# Patient Record
Sex: FEMALE | Race: WHITE | NOT HISPANIC OR LATINO | URBAN - METROPOLITAN AREA
[De-identification: names, ages, dates, MRNs, and addresses within clinical notes are randomized per-mention and may not be internally consistent; named-entity substitution may affect disease eponyms.]

---

## 2017-04-18 ENCOUNTER — OUTPATIENT (OUTPATIENT)
Dept: OUTPATIENT SERVICES | Facility: HOSPITAL | Age: 52
LOS: 1 days | End: 2017-04-18
Payer: COMMERCIAL

## 2017-04-18 ENCOUNTER — APPOINTMENT (OUTPATIENT)
Dept: CT IMAGING | Facility: HOSPITAL | Age: 52
End: 2017-04-18

## 2017-04-18 DIAGNOSIS — M71.30 OTHER BURSAL CYST, UNSPECIFIED SITE: Chronic | ICD-10-CM

## 2017-04-18 DIAGNOSIS — Z98.89 OTHER SPECIFIED POSTPROCEDURAL STATES: Chronic | ICD-10-CM

## 2017-04-18 DIAGNOSIS — R91.8 OTHER NONSPECIFIC ABNORMAL FINDING OF LUNG FIELD: ICD-10-CM

## 2017-04-18 PROCEDURE — 71250 CT THORAX DX C-: CPT

## 2017-05-10 ENCOUNTER — EMERGENCY (EMERGENCY)
Facility: HOSPITAL | Age: 52
LOS: 1 days | Discharge: HOME | End: 2017-05-10
Attending: EMERGENCY MEDICINE | Admitting: EMERGENCY MEDICINE
Payer: COMMERCIAL

## 2017-05-10 VITALS
RESPIRATION RATE: 16 BRPM | HEART RATE: 87 BPM | HEIGHT: 64 IN | TEMPERATURE: 98 F | DIASTOLIC BLOOD PRESSURE: 97 MMHG | OXYGEN SATURATION: 97 % | WEIGHT: 164.91 LBS | SYSTOLIC BLOOD PRESSURE: 144 MMHG

## 2017-05-10 VITALS — HEART RATE: 95 BPM | SYSTOLIC BLOOD PRESSURE: 174 MMHG | OXYGEN SATURATION: 97 % | DIASTOLIC BLOOD PRESSURE: 80 MMHG

## 2017-05-10 DIAGNOSIS — Z98.89 OTHER SPECIFIED POSTPROCEDURAL STATES: Chronic | ICD-10-CM

## 2017-05-10 DIAGNOSIS — Z88.1 ALLERGY STATUS TO OTHER ANTIBIOTIC AGENTS STATUS: ICD-10-CM

## 2017-05-10 DIAGNOSIS — M71.30 OTHER BURSAL CYST, UNSPECIFIED SITE: Chronic | ICD-10-CM

## 2017-05-10 DIAGNOSIS — K21.9 GASTRO-ESOPHAGEAL REFLUX DISEASE WITHOUT ESOPHAGITIS: ICD-10-CM

## 2017-05-10 DIAGNOSIS — J45.909 UNSPECIFIED ASTHMA, UNCOMPLICATED: ICD-10-CM
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
## 2017-05-10 DIAGNOSIS — M25.571 PAIN IN RIGHT ANKLE AND JOINTS OF RIGHT FOOT: ICD-10-CM

## 2017-05-10 DIAGNOSIS — E03.9 HYPOTHYROIDISM, UNSPECIFIED: ICD-10-CM
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
## 2017-05-10 DIAGNOSIS — S80.211A ABRASION, RIGHT KNEE, INITIAL ENCOUNTER: ICD-10-CM

## 2017-05-10 DIAGNOSIS — Z98.890 OTHER SPECIFIED POSTPROCEDURAL STATES: ICD-10-CM
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
## 2017-05-10 DIAGNOSIS — M54.5 LOW BACK PAIN: ICD-10-CM
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
## 2017-05-10 PROCEDURE — 73562 X-RAY EXAM OF KNEE 3: CPT

## 2017-05-10 PROCEDURE — 73630 X-RAY EXAM OF FOOT: CPT | Mod: 26,RT

## 2017-05-10 PROCEDURE — 72100 X-RAY EXAM L-S SPINE 2/3 VWS: CPT | Mod: 26

## 2017-05-10 PROCEDURE — 99284 EMERGENCY DEPT VISIT MOD MDM: CPT | Mod: 25

## 2017-05-10 PROCEDURE — 73562 X-RAY EXAM OF KNEE 3: CPT | Mod: 26,RT

## 2017-05-10 PROCEDURE — 72100 X-RAY EXAM L-S SPINE 2/3 VWS: CPT

## 2017-05-10 PROCEDURE — 73630 X-RAY EXAM OF FOOT: CPT

## 2017-05-10 PROCEDURE — 99284 EMERGENCY DEPT VISIT MOD MDM: CPT

## 2017-05-10 RX ORDER — IBUPROFEN 200 MG
600 TABLET ORAL ONCE
Qty: 0 | Refills: 0 | Status: COMPLETED | OUTPATIENT
Start: 2017-05-10 | End: 2017-05-10

## 2017-05-10 RX ADMIN — Medication 600 MILLIGRAM(S): at 18:53

## 2017-05-10 NOTE — ED STATDOCS - PSH
H/O endoscopic sinus surgery  rhinoplasty 5/2015  H/O lymph node excision  09/2010, benign  S/P thoracotomy  06/30/2013  Right VATS  Wedge resection  Synovial cyst  L5- S1   08/26/2016 back surgery

## 2017-05-10 NOTE — ED STATDOCS - PMH
Asthma  controlled on meds  Autoimmune disorder  Auto immune lung disorder  immunoglobulin  deficiency(IgG)  Intertitial Cellular pneumonia  -2013 VATS/ wedge rsx  Chronic sinusitis  frontal, maxillary  h/o sinus surgery  GERD (gastroesophageal reflux disease)    GERD (gastroesophageal reflux disease)    Hypothyroid    Lung nodules    Migraine  "not for a while now"  Rosacea

## 2017-05-10 NOTE — ED STATDOCS - PROGRESS NOTE DETAILS
Pt seen and she stated that her foot hurt . Pt stated that she feel down some stairs. Pt denies head trauma or trauma to any other parts  of her body. Examination + tenderness to right great toe with no ecchymosis. Pt unable to bear weight on foot due to pain. Posterior splint applied and crutches provided. Pt will continue with ibuprofen and F/U with Podiatrist.

## 2017-05-10 NOTE — ED STATDOCS - OBJECTIVE STATEMENT
50 y/o female presents c/o right foot pain s/p fall. Pt also notes abrasion to right knee. Denies any head injuries/trauma. Pt has not taken any medications for pain. PSHx = lumbar fusion. Pt requesting back XR to "make sure she didn't mess up her fusion" but denies any back pain.

## 2017-05-10 NOTE — ED STATDOCS - CARE PLAN
Principal Discharge DX:	Right foot pain  Instructions for follow-up, activity and diet:	Decrease weight bearing and OTC ( Ibuprofen ) . F/U with orthopedic  Secondary Diagnosis:	Acute right ankle pain  Secondary Diagnosis:	Low back pain without sciatica, unspecified back pain laterality, unspecified chronicity

## 2017-05-10 NOTE — ED STATDOCS - NS ED MD SCRIBE ATTENDING SCRIBE SECTIONS
REVIEW OF SYSTEMS/DISPOSITION/PAST MEDICAL/SURGICAL/SOCIAL HISTORY/HIV/VITAL SIGNS( Pullset)/PHYSICAL EXAM/HISTORY OF PRESENT ILLNESS

## 2017-05-10 NOTE — ED ADULT TRIAGE NOTE - CHIEF COMPLAINT QUOTE
slipped at work down one step. patient reporting pain right knee/right foot. patient reports hx lumbar fusion 1 year ago; denies any back pain. no anticoags. did not hit head or lose consciousness.

## 2017-05-10 NOTE — ED STATDOCS - SKIN, MLM
Abrasion to anterior right knee. Healed scar to low back noted. Abrasion to anterior right knee. Healed scar to lower back noted.

## 2017-05-10 NOTE — ED STATDOCS - MUSCULOSKELETAL, MLM
Right foot: First digit tender to palpation. Good cap refill. Pulses intact. Spine normal, nontender.

## 2017-05-10 NOTE — ED STATDOCS - ATTENDING CONTRIBUTION TO CARE
I, Brock Joya, performed the initial face to face bedside interview with this patient regarding history of present illness, review of symptoms and relevant past medical, social and family history.  I completed an independent physical examination.  I was the initial provider who evaluated this patient. I have signed out the follow up of any pending tests (i.e. labs, radiological studies) to the ACP.  I have communicated the patient’s plan of care and disposition with the ACP.  The history, relevant review of systems, past medical and surgical history, medical decision making, and physical examination was documented by the scribe in my presence and I attest to the accuracy of the documentation.

## 2017-05-10 NOTE — ED STATDOCS - CONSTITUTIONAL, MLM
normal... well appearing, well nourished, and in no apparent distress. well appearing, well nourished, and in no apparent distress.  anxious appearing

## 2017-05-10 NOTE — ED STATDOCS - SECONDARY DIAGNOSIS.
Acute right ankle pain Low back pain without sciatica, unspecified back pain laterality, unspecified chronicity

## 2017-08-07 ENCOUNTER — APPOINTMENT (OUTPATIENT)
Dept: HEMATOLOGY ONCOLOGY | Facility: CLINIC | Age: 52
End: 2017-08-07
Payer: COMMERCIAL

## 2017-08-07 ENCOUNTER — MED ADMIN CHARGE (OUTPATIENT)
Age: 52
End: 2017-08-07

## 2017-08-07 VITALS — DIASTOLIC BLOOD PRESSURE: 62 MMHG | SYSTOLIC BLOOD PRESSURE: 112 MMHG | TEMPERATURE: 97.9 F

## 2017-08-07 VITALS — HEART RATE: 72 BPM

## 2017-08-07 DIAGNOSIS — Z92.89 PERSONAL HISTORY OF OTHER MEDICAL TREATMENT: ICD-10-CM

## 2017-08-07 PROCEDURE — 99214 OFFICE O/P EST MOD 30 MIN: CPT

## 2017-08-22 ENCOUNTER — APPOINTMENT (OUTPATIENT)
Dept: CT IMAGING | Facility: HOSPITAL | Age: 52
End: 2017-08-22

## 2017-08-23 ENCOUNTER — APPOINTMENT (OUTPATIENT)
Dept: CT IMAGING | Facility: CLINIC | Age: 52
End: 2017-08-23
Payer: COMMERCIAL

## 2017-08-23 ENCOUNTER — OUTPATIENT (OUTPATIENT)
Dept: OUTPATIENT SERVICES | Facility: HOSPITAL | Age: 52
LOS: 1 days | End: 2017-08-23
Payer: COMMERCIAL

## 2017-08-23 DIAGNOSIS — Z00.8 ENCOUNTER FOR OTHER GENERAL EXAMINATION: ICD-10-CM

## 2017-08-23 DIAGNOSIS — Z98.89 OTHER SPECIFIED POSTPROCEDURAL STATES: Chronic | ICD-10-CM

## 2017-08-23 DIAGNOSIS — M71.30 OTHER BURSAL CYST, UNSPECIFIED SITE: Chronic | ICD-10-CM

## 2017-08-23 PROCEDURE — 71250 CT THORAX DX C-: CPT

## 2017-08-23 PROCEDURE — 71250 CT THORAX DX C-: CPT | Mod: 26

## 2017-08-31 ENCOUNTER — APPOINTMENT (OUTPATIENT)
Dept: PULMONOLOGY | Facility: CLINIC | Age: 52
End: 2017-08-31
Payer: COMMERCIAL

## 2017-08-31 VITALS
HEIGHT: 63 IN | SYSTOLIC BLOOD PRESSURE: 110 MMHG | RESPIRATION RATE: 17 BRPM | WEIGHT: 164 LBS | HEART RATE: 76 BPM | OXYGEN SATURATION: 99 % | BODY MASS INDEX: 29.06 KG/M2 | DIASTOLIC BLOOD PRESSURE: 78 MMHG

## 2017-08-31 DIAGNOSIS — Z83.511 FAMILY HISTORY OF GLAUCOMA: ICD-10-CM

## 2017-08-31 DIAGNOSIS — F15.90 OTHER STIMULANT USE, UNSPECIFIED, UNCOMPLICATED: ICD-10-CM

## 2017-08-31 DIAGNOSIS — Z84.89 FAMILY HISTORY OF OTHER SPECIFIED CONDITIONS: ICD-10-CM

## 2017-08-31 DIAGNOSIS — Z86.39 PERSONAL HISTORY OF OTHER ENDOCRINE, NUTRITIONAL AND METABOLIC DISEASE: ICD-10-CM

## 2017-08-31 DIAGNOSIS — Z87.19 PERSONAL HISTORY OF OTHER DISEASES OF THE DIGESTIVE SYSTEM: ICD-10-CM

## 2017-08-31 PROCEDURE — 94727 GAS DIL/WSHOT DETER LNG VOL: CPT

## 2017-08-31 PROCEDURE — 95012 NITRIC OXIDE EXP GAS DETER: CPT

## 2017-08-31 PROCEDURE — 99205 OFFICE O/P NEW HI 60 MIN: CPT | Mod: 25

## 2017-08-31 PROCEDURE — 94010 BREATHING CAPACITY TEST: CPT

## 2017-08-31 PROCEDURE — 94729 DIFFUSING CAPACITY: CPT

## 2017-08-31 RX ORDER — AZITHROMYCIN DIHYDRATE 500 MG/1
500 TABLET, FILM COATED ORAL
Refills: 0 | Status: DISCONTINUED | COMMUNITY
End: 2017-08-31

## 2017-09-08 ENCOUNTER — MEDICATION RENEWAL (OUTPATIENT)
Age: 52
End: 2017-09-08

## 2017-09-08 DIAGNOSIS — Z29.9 ENCOUNTER FOR PROPHYLACTIC MEASURES, UNSPECIFIED: ICD-10-CM

## 2017-09-08 RX ORDER — EPINEPHRINE 0.3 MG/.3ML
0.3 INJECTION INTRAMUSCULAR
Qty: 1 | Refills: 0 | Status: ACTIVE | COMMUNITY
Start: 2017-09-08 | End: 1900-01-01

## 2017-09-12 ENCOUNTER — APPOINTMENT (OUTPATIENT)
Dept: PULMONOLOGY | Facility: CLINIC | Age: 52
End: 2017-09-12
Payer: COMMERCIAL

## 2017-09-12 VITALS
SYSTOLIC BLOOD PRESSURE: 120 MMHG | RESPIRATION RATE: 17 BRPM | DIASTOLIC BLOOD PRESSURE: 80 MMHG | HEIGHT: 63 IN | OXYGEN SATURATION: 97 % | BODY MASS INDEX: 29.06 KG/M2 | HEART RATE: 117 BPM | WEIGHT: 164 LBS

## 2017-09-12 PROCEDURE — 99214 OFFICE O/P EST MOD 30 MIN: CPT | Mod: 25

## 2017-09-12 PROCEDURE — 95012 NITRIC OXIDE EXP GAS DETER: CPT

## 2017-09-12 PROCEDURE — 94010 BREATHING CAPACITY TEST: CPT

## 2017-09-12 PROCEDURE — 96401 CHEMO ANTI-NEOPL SQ/IM: CPT

## 2017-09-12 RX ORDER — MEPOLIZUMAB 100 MG/ML
100 INJECTION, POWDER, FOR SOLUTION SUBCUTANEOUS
Refills: 0 | Status: COMPLETED | OUTPATIENT
Start: 2017-09-12

## 2017-09-12 RX ADMIN — MEPOLIZUMAB 1 MG: 100 INJECTION, POWDER, FOR SOLUTION SUBCUTANEOUS at 00:00

## 2017-09-14 ENCOUNTER — APPOINTMENT (OUTPATIENT)
Dept: OBGYN | Facility: CLINIC | Age: 52
End: 2017-09-14
Payer: COMMERCIAL

## 2017-09-14 VITALS
BODY MASS INDEX: 28.17 KG/M2 | SYSTOLIC BLOOD PRESSURE: 110 MMHG | DIASTOLIC BLOOD PRESSURE: 74 MMHG | RESPIRATION RATE: 18 BRPM | WEIGHT: 159 LBS | HEIGHT: 63 IN

## 2017-09-14 DIAGNOSIS — N63 UNSPECIFIED LUMP IN BREAST: ICD-10-CM

## 2017-09-14 PROCEDURE — 99396 PREV VISIT EST AGE 40-64: CPT

## 2017-09-17 LAB — HPV HIGH+LOW RISK DNA PNL CVX: NEGATIVE

## 2017-09-18 LAB — CYTOLOGY CVX/VAG DOC THIN PREP: NORMAL

## 2017-09-22 ENCOUNTER — MED ADMIN CHARGE (OUTPATIENT)
Age: 52
End: 2017-09-22

## 2017-09-27 ENCOUNTER — FORM ENCOUNTER (OUTPATIENT)
Age: 52
End: 2017-09-27

## 2017-09-28 ENCOUNTER — APPOINTMENT (OUTPATIENT)
Dept: ULTRASOUND IMAGING | Facility: IMAGING CENTER | Age: 52
End: 2017-09-28
Payer: COMMERCIAL

## 2017-09-28 ENCOUNTER — RESULT REVIEW (OUTPATIENT)
Age: 52
End: 2017-09-28

## 2017-09-28 ENCOUNTER — FORM ENCOUNTER (OUTPATIENT)
Age: 52
End: 2017-09-28

## 2017-09-28 ENCOUNTER — OUTPATIENT (OUTPATIENT)
Dept: OUTPATIENT SERVICES | Facility: HOSPITAL | Age: 52
LOS: 1 days | End: 2017-09-28
Payer: COMMERCIAL

## 2017-09-28 DIAGNOSIS — Z98.89 OTHER SPECIFIED POSTPROCEDURAL STATES: Chronic | ICD-10-CM

## 2017-09-28 DIAGNOSIS — M71.30 OTHER BURSAL CYST, UNSPECIFIED SITE: Chronic | ICD-10-CM

## 2017-09-28 DIAGNOSIS — Z00.8 ENCOUNTER FOR OTHER GENERAL EXAMINATION: ICD-10-CM

## 2017-09-28 PROCEDURE — G0206: CPT | Mod: 26,LT

## 2017-09-28 PROCEDURE — 88360 TUMOR IMMUNOHISTOCHEM/MANUAL: CPT | Mod: 26

## 2017-09-28 PROCEDURE — 88305 TISSUE EXAM BY PATHOLOGIST: CPT | Mod: 26

## 2017-09-29 PROCEDURE — 19083 BX BREAST 1ST LESION US IMAG: CPT | Mod: LT

## 2017-09-29 PROCEDURE — 76942 ECHO GUIDE FOR BIOPSY: CPT | Mod: 26,59

## 2017-09-29 PROCEDURE — 76942 ECHO GUIDE FOR BIOPSY: CPT

## 2017-09-29 PROCEDURE — 19084 BX BREAST ADD LESION US IMAG: CPT | Mod: LT

## 2017-09-29 PROCEDURE — 19083 BX BREAST 1ST LESION US IMAG: CPT

## 2017-09-29 PROCEDURE — 88305 TISSUE EXAM BY PATHOLOGIST: CPT

## 2017-09-29 PROCEDURE — 19084 BX BREAST ADD LESION US IMAG: CPT

## 2017-09-29 PROCEDURE — 88360 TUMOR IMMUNOHISTOCHEM/MANUAL: CPT

## 2017-09-29 PROCEDURE — 77065 DX MAMMO INCL CAD UNI: CPT

## 2017-09-29 PROCEDURE — 38505 NEEDLE BIOPSY LYMPH NODES: CPT | Mod: LT

## 2017-09-29 PROCEDURE — A4648: CPT

## 2017-09-29 PROCEDURE — 38505 NEEDLE BIOPSY LYMPH NODES: CPT

## 2017-09-30 LAB — SURGICAL PATHOLOGY STUDY: SIGNIFICANT CHANGE UP

## 2017-10-03 DIAGNOSIS — N63 UNSPECIFIED LUMP IN BREAST: ICD-10-CM

## 2017-10-03 DIAGNOSIS — R92.8 OTHER ABNORMAL AND INCONCLUSIVE FINDINGS ON DIAGNOSTIC IMAGING OF BREAST: ICD-10-CM

## 2017-10-03 DIAGNOSIS — R59.0 LOCALIZED ENLARGED LYMPH NODES: ICD-10-CM

## 2017-10-09 ENCOUNTER — APPOINTMENT (OUTPATIENT)
Dept: PULMONOLOGY | Facility: CLINIC | Age: 52
End: 2017-10-09
Payer: COMMERCIAL

## 2017-10-09 VITALS
RESPIRATION RATE: 17 BRPM | SYSTOLIC BLOOD PRESSURE: 120 MMHG | WEIGHT: 159 LBS | HEART RATE: 69 BPM | BODY MASS INDEX: 28.17 KG/M2 | DIASTOLIC BLOOD PRESSURE: 70 MMHG | OXYGEN SATURATION: 97 % | HEIGHT: 63 IN

## 2017-10-09 PROCEDURE — 96401 CHEMO ANTI-NEOPL SQ/IM: CPT

## 2017-10-09 RX ORDER — MEPOLIZUMAB 100 MG/ML
100 INJECTION, POWDER, FOR SOLUTION SUBCUTANEOUS
Qty: 0 | Refills: 0 | Status: COMPLETED | OUTPATIENT
Start: 2017-09-12

## 2017-10-09 RX ADMIN — MEPOLIZUMAB 1 MG: 100 INJECTION, POWDER, FOR SOLUTION SUBCUTANEOUS at 00:00

## 2017-10-11 ENCOUNTER — APPOINTMENT (OUTPATIENT)
Dept: OBGYN | Facility: CLINIC | Age: 52
End: 2017-10-11
Payer: COMMERCIAL

## 2017-10-11 VITALS
DIASTOLIC BLOOD PRESSURE: 76 MMHG | OXYGEN SATURATION: 98 % | BODY MASS INDEX: 28.35 KG/M2 | HEIGHT: 63 IN | WEIGHT: 160 LBS | SYSTOLIC BLOOD PRESSURE: 126 MMHG | HEART RATE: 67 BPM

## 2017-10-11 DIAGNOSIS — N85.00 ENDOMETRIAL HYPERPLASIA, UNSPECIFIED: ICD-10-CM

## 2017-10-11 DIAGNOSIS — N95.0 POSTMENOPAUSAL BLEEDING: ICD-10-CM

## 2017-10-11 PROCEDURE — 58100 BIOPSY OF UTERUS LINING: CPT

## 2017-10-11 PROCEDURE — 99214 OFFICE O/P EST MOD 30 MIN: CPT | Mod: 25

## 2017-10-12 LAB
HCG UR QL: NEGATIVE
QUALITY CONTROL: YES

## 2017-10-19 LAB — CORE LAB BIOPSY: NORMAL

## 2017-11-07 ENCOUNTER — APPOINTMENT (OUTPATIENT)
Dept: PULMONOLOGY | Facility: CLINIC | Age: 52
End: 2017-11-07
Payer: COMMERCIAL

## 2017-11-07 VITALS
WEIGHT: 160 LBS | BODY MASS INDEX: 28.35 KG/M2 | SYSTOLIC BLOOD PRESSURE: 112 MMHG | DIASTOLIC BLOOD PRESSURE: 78 MMHG | RESPIRATION RATE: 17 BRPM | HEIGHT: 63 IN | OXYGEN SATURATION: 96 % | HEART RATE: 86 BPM

## 2017-11-07 PROCEDURE — 94010 BREATHING CAPACITY TEST: CPT

## 2017-11-07 PROCEDURE — 99214 OFFICE O/P EST MOD 30 MIN: CPT | Mod: 25

## 2017-11-07 PROCEDURE — 96401 CHEMO ANTI-NEOPL SQ/IM: CPT

## 2017-11-07 RX ORDER — MEPOLIZUMAB 100 MG/ML
100 INJECTION, POWDER, FOR SOLUTION SUBCUTANEOUS
Qty: 0 | Refills: 0 | Status: COMPLETED | OUTPATIENT
Start: 2017-11-07

## 2017-11-07 RX ADMIN — MEPOLIZUMAB 1 MG: 100 INJECTION, POWDER, FOR SOLUTION SUBCUTANEOUS at 00:00

## 2017-11-17 ENCOUNTER — APPOINTMENT (OUTPATIENT)
Dept: NUCLEAR MEDICINE | Facility: CLINIC | Age: 52
End: 2017-11-17
Payer: COMMERCIAL

## 2017-11-17 ENCOUNTER — OUTPATIENT (OUTPATIENT)
Dept: OUTPATIENT SERVICES | Facility: HOSPITAL | Age: 52
LOS: 1 days | End: 2017-11-17

## 2017-11-17 DIAGNOSIS — Z00.8 ENCOUNTER FOR OTHER GENERAL EXAMINATION: ICD-10-CM

## 2017-11-17 DIAGNOSIS — Z98.89 OTHER SPECIFIED POSTPROCEDURAL STATES: Chronic | ICD-10-CM

## 2017-11-17 DIAGNOSIS — M71.30 OTHER BURSAL CYST, UNSPECIFIED SITE: Chronic | ICD-10-CM

## 2017-11-17 PROCEDURE — 78306 BONE IMAGING WHOLE BODY: CPT | Mod: 26

## 2017-12-06 ENCOUNTER — APPOINTMENT (OUTPATIENT)
Dept: PULMONOLOGY | Facility: CLINIC | Age: 52
End: 2017-12-06
Payer: COMMERCIAL

## 2017-12-06 ENCOUNTER — APPOINTMENT (OUTPATIENT)
Dept: PULMONOLOGY | Facility: CLINIC | Age: 52
End: 2017-12-06

## 2017-12-06 VITALS
HEIGHT: 64 IN | BODY MASS INDEX: 27.66 KG/M2 | HEART RATE: 66 BPM | WEIGHT: 162 LBS | SYSTOLIC BLOOD PRESSURE: 114 MMHG | OXYGEN SATURATION: 98 % | DIASTOLIC BLOOD PRESSURE: 68 MMHG | RESPIRATION RATE: 17 BRPM

## 2017-12-06 PROCEDURE — 96401 CHEMO ANTI-NEOPL SQ/IM: CPT

## 2017-12-06 PROCEDURE — 99214 OFFICE O/P EST MOD 30 MIN: CPT | Mod: 25

## 2017-12-06 PROCEDURE — 94010 BREATHING CAPACITY TEST: CPT

## 2017-12-06 RX ORDER — MEPOLIZUMAB 100 MG/ML
100 INJECTION, POWDER, FOR SOLUTION SUBCUTANEOUS
Qty: 0 | Refills: 0 | Status: COMPLETED | OUTPATIENT
Start: 2017-12-06

## 2017-12-06 RX ADMIN — MEPOLIZUMAB 1 MG: 100 INJECTION, POWDER, FOR SOLUTION SUBCUTANEOUS at 00:00

## 2018-01-03 ENCOUNTER — APPOINTMENT (OUTPATIENT)
Dept: PULMONOLOGY | Facility: CLINIC | Age: 53
End: 2018-01-03
Payer: COMMERCIAL

## 2018-01-03 ENCOUNTER — RX RENEWAL (OUTPATIENT)
Age: 53
End: 2018-01-03

## 2018-01-03 VITALS
WEIGHT: 162 LBS | DIASTOLIC BLOOD PRESSURE: 80 MMHG | HEIGHT: 64 IN | RESPIRATION RATE: 17 BRPM | BODY MASS INDEX: 27.66 KG/M2 | SYSTOLIC BLOOD PRESSURE: 120 MMHG

## 2018-01-03 PROCEDURE — 96401 CHEMO ANTI-NEOPL SQ/IM: CPT

## 2018-01-03 RX ADMIN — MEPOLIZUMAB 1 MG: 100 INJECTION, POWDER, FOR SOLUTION SUBCUTANEOUS at 00:00

## 2018-01-29 RX ORDER — MEPOLIZUMAB 100 MG/ML
100 INJECTION, POWDER, FOR SOLUTION SUBCUTANEOUS
Qty: 0 | Refills: 0 | Status: COMPLETED | OUTPATIENT
Start: 2018-01-03

## 2018-01-30 ENCOUNTER — APPOINTMENT (OUTPATIENT)
Dept: PULMONOLOGY | Facility: CLINIC | Age: 53
End: 2018-01-30

## 2018-02-05 ENCOUNTER — APPOINTMENT (OUTPATIENT)
Dept: PULMONOLOGY | Facility: CLINIC | Age: 53
End: 2018-02-05
Payer: COMMERCIAL

## 2018-02-05 VITALS
OXYGEN SATURATION: 98 % | BODY MASS INDEX: 27.66 KG/M2 | WEIGHT: 162 LBS | HEART RATE: 66 BPM | HEIGHT: 64 IN | SYSTOLIC BLOOD PRESSURE: 120 MMHG | DIASTOLIC BLOOD PRESSURE: 70 MMHG

## 2018-02-05 VITALS
SYSTOLIC BLOOD PRESSURE: 110 MMHG | BODY MASS INDEX: 27.66 KG/M2 | DIASTOLIC BLOOD PRESSURE: 60 MMHG | OXYGEN SATURATION: 98 % | WEIGHT: 162 LBS | HEART RATE: 64 BPM | HEIGHT: 64 IN

## 2018-02-05 PROCEDURE — 99214 OFFICE O/P EST MOD 30 MIN: CPT | Mod: 25

## 2018-02-05 PROCEDURE — 96401 CHEMO ANTI-NEOPL SQ/IM: CPT

## 2018-02-05 PROCEDURE — 94010 BREATHING CAPACITY TEST: CPT

## 2018-02-05 RX ORDER — MEPOLIZUMAB 100 MG/ML
100 INJECTION, POWDER, FOR SOLUTION SUBCUTANEOUS
Qty: 0 | Refills: 0 | Status: COMPLETED | OUTPATIENT
Start: 2018-02-05

## 2018-02-05 RX ADMIN — MEPOLIZUMAB 1 MG: 100 INJECTION, POWDER, FOR SOLUTION SUBCUTANEOUS at 00:00

## 2018-02-06 RX ORDER — LEVALBUTEROL HYDROCHLORIDE 0.31 MG/3ML
0.31 SOLUTION RESPIRATORY (INHALATION) EVERY 6 HOURS
Qty: 120 | Refills: 2 | Status: ACTIVE | COMMUNITY
Start: 2018-02-05 | End: 1900-01-01

## 2018-02-08 ENCOUNTER — FORM ENCOUNTER (OUTPATIENT)
Age: 53
End: 2018-02-08

## 2018-02-09 ENCOUNTER — APPOINTMENT (OUTPATIENT)
Dept: RADIOLOGY | Facility: HOSPITAL | Age: 53
End: 2018-02-09
Payer: COMMERCIAL

## 2018-02-09 ENCOUNTER — OUTPATIENT (OUTPATIENT)
Dept: OUTPATIENT SERVICES | Facility: HOSPITAL | Age: 53
LOS: 1 days | End: 2018-02-09
Payer: COMMERCIAL

## 2018-02-09 DIAGNOSIS — Z98.89 OTHER SPECIFIED POSTPROCEDURAL STATES: Chronic | ICD-10-CM

## 2018-02-09 DIAGNOSIS — J98.6 DISORDERS OF DIAPHRAGM: ICD-10-CM

## 2018-02-09 DIAGNOSIS — Z00.00 ENCOUNTER FOR GENERAL ADULT MEDICAL EXAMINATION WITHOUT ABNORMAL FINDINGS: ICD-10-CM

## 2018-02-09 DIAGNOSIS — M71.30 OTHER BURSAL CYST, UNSPECIFIED SITE: Chronic | ICD-10-CM

## 2018-02-09 PROCEDURE — 76000 FLUOROSCOPY <1 HR PHYS/QHP: CPT

## 2018-02-09 PROCEDURE — 76000 FLUOROSCOPY <1 HR PHYS/QHP: CPT | Mod: 26

## 2018-02-27 ENCOUNTER — APPOINTMENT (OUTPATIENT)
Dept: PULMONOLOGY | Facility: CLINIC | Age: 53
End: 2018-02-27

## 2018-03-05 ENCOUNTER — APPOINTMENT (OUTPATIENT)
Dept: PULMONOLOGY | Facility: CLINIC | Age: 53
End: 2018-03-05
Payer: COMMERCIAL

## 2018-03-05 VITALS
SYSTOLIC BLOOD PRESSURE: 110 MMHG | HEIGHT: 64 IN | WEIGHT: 162 LBS | DIASTOLIC BLOOD PRESSURE: 80 MMHG | OXYGEN SATURATION: 97 % | BODY MASS INDEX: 27.66 KG/M2 | HEART RATE: 64 BPM

## 2018-03-05 PROCEDURE — 96401 CHEMO ANTI-NEOPL SQ/IM: CPT

## 2018-03-05 RX ORDER — MEPOLIZUMAB 100 MG/ML
100 INJECTION, POWDER, FOR SOLUTION SUBCUTANEOUS
Qty: 0 | Refills: 0 | Status: COMPLETED | OUTPATIENT
Start: 2018-03-05

## 2018-03-05 RX ADMIN — MEPOLIZUMAB 1 MG: 100 INJECTION, POWDER, FOR SOLUTION SUBCUTANEOUS at 00:00

## 2018-04-09 ENCOUNTER — APPOINTMENT (OUTPATIENT)
Dept: PULMONOLOGY | Facility: CLINIC | Age: 53
End: 2018-04-09
Payer: COMMERCIAL

## 2018-04-09 VITALS
BODY MASS INDEX: 27.66 KG/M2 | OXYGEN SATURATION: 100 % | DIASTOLIC BLOOD PRESSURE: 70 MMHG | SYSTOLIC BLOOD PRESSURE: 120 MMHG | HEIGHT: 64 IN | RESPIRATION RATE: 17 BRPM | HEART RATE: 105 BPM | WEIGHT: 162 LBS

## 2018-04-09 PROCEDURE — 96401 CHEMO ANTI-NEOPL SQ/IM: CPT

## 2018-04-09 PROCEDURE — 94010 BREATHING CAPACITY TEST: CPT

## 2018-04-09 PROCEDURE — 99214 OFFICE O/P EST MOD 30 MIN: CPT | Mod: 25

## 2018-04-09 PROCEDURE — 95012 NITRIC OXIDE EXP GAS DETER: CPT

## 2018-04-09 RX ORDER — CLINDAMYCIN HYDROCHLORIDE 300 MG/1
300 CAPSULE ORAL
Qty: 45 | Refills: 0 | Status: DISCONTINUED | COMMUNITY
Start: 2017-10-30 | End: 2018-04-09

## 2018-04-09 RX ORDER — CIPROFLOXACIN HYDROCHLORIDE 500 MG/1
500 TABLET, FILM COATED ORAL
Qty: 10 | Refills: 0 | Status: DISCONTINUED | COMMUNITY
Start: 2017-12-01 | End: 2018-04-09

## 2018-04-09 RX ORDER — DOXYCYCLINE HYCLATE 100 MG/1
100 CAPSULE ORAL
Qty: 10 | Refills: 0 | Status: DISCONTINUED | COMMUNITY
Start: 2017-12-01 | End: 2018-04-09

## 2018-04-09 RX ORDER — AZITHROMYCIN 250 MG/1
250 TABLET, FILM COATED ORAL
Qty: 6 | Refills: 0 | Status: DISCONTINUED | COMMUNITY
Start: 2017-11-27 | End: 2018-04-09

## 2018-04-09 RX ORDER — MEPOLIZUMAB 100 MG/ML
100 INJECTION, POWDER, FOR SOLUTION SUBCUTANEOUS
Qty: 0 | Refills: 0 | Status: COMPLETED | OUTPATIENT
Start: 2018-04-09

## 2018-04-09 RX ORDER — HYDROCODONE BITARTRATE AND HOMATROPINE METHYLBROMIDE 5; 1.5 MG/5ML; MG/5ML
5-1.5 SYRUP ORAL
Qty: 800 | Refills: 0 | Status: DISCONTINUED | COMMUNITY
Start: 2017-12-06 | End: 2018-04-09

## 2018-04-09 RX ADMIN — MEPOLIZUMAB 1 MG: 100 INJECTION, POWDER, FOR SOLUTION SUBCUTANEOUS at 00:00

## 2018-05-11 ENCOUNTER — APPOINTMENT (OUTPATIENT)
Dept: PULMONOLOGY | Facility: CLINIC | Age: 53
End: 2018-05-11
Payer: COMMERCIAL

## 2018-05-11 PROCEDURE — 96401 CHEMO ANTI-NEOPL SQ/IM: CPT

## 2018-05-11 RX ORDER — MEPOLIZUMAB 100 MG/ML
100 INJECTION, POWDER, FOR SOLUTION SUBCUTANEOUS
Qty: 0 | Refills: 0 | Status: COMPLETED | OUTPATIENT
Start: 2018-05-11

## 2018-05-11 RX ADMIN — MEPOLIZUMAB 1 MG: 100 INJECTION, POWDER, FOR SOLUTION SUBCUTANEOUS at 00:00

## 2018-06-11 ENCOUNTER — APPOINTMENT (OUTPATIENT)
Dept: PULMONOLOGY | Facility: CLINIC | Age: 53
End: 2018-06-11
Payer: COMMERCIAL

## 2018-06-11 VITALS
OXYGEN SATURATION: 100 % | RESPIRATION RATE: 17 BRPM | HEART RATE: 108 BPM | SYSTOLIC BLOOD PRESSURE: 110 MMHG | BODY MASS INDEX: 29.02 KG/M2 | WEIGHT: 170 LBS | DIASTOLIC BLOOD PRESSURE: 80 MMHG | HEIGHT: 64 IN

## 2018-06-11 PROCEDURE — 99214 OFFICE O/P EST MOD 30 MIN: CPT | Mod: 25

## 2018-06-11 PROCEDURE — 96401 CHEMO ANTI-NEOPL SQ/IM: CPT

## 2018-06-11 RX ORDER — MEPOLIZUMAB 100 MG/ML
100 INJECTION, POWDER, FOR SOLUTION SUBCUTANEOUS
Qty: 0 | Refills: 0 | Status: COMPLETED | OUTPATIENT
Start: 2018-06-11

## 2018-06-11 RX ADMIN — MEPOLIZUMAB 1 MG: 100 INJECTION, POWDER, FOR SOLUTION SUBCUTANEOUS at 00:00

## 2018-06-29 ENCOUNTER — TRANSCRIPTION ENCOUNTER (OUTPATIENT)
Age: 53
End: 2018-06-29

## 2018-07-06 ENCOUNTER — FORM ENCOUNTER (OUTPATIENT)
Age: 53
End: 2018-07-06

## 2018-07-07 ENCOUNTER — OUTPATIENT (OUTPATIENT)
Dept: OUTPATIENT SERVICES | Facility: HOSPITAL | Age: 53
LOS: 1 days | End: 2018-07-07
Payer: COMMERCIAL

## 2018-07-07 ENCOUNTER — APPOINTMENT (OUTPATIENT)
Dept: CT IMAGING | Facility: CLINIC | Age: 53
End: 2018-07-07
Payer: COMMERCIAL

## 2018-07-07 DIAGNOSIS — Z98.89 OTHER SPECIFIED POSTPROCEDURAL STATES: Chronic | ICD-10-CM

## 2018-07-07 DIAGNOSIS — R93.8 ABNORMAL FINDINGS ON DIAGNOSTIC IMAGING OF OTHER SPECIFIED BODY STRUCTURES: ICD-10-CM

## 2018-07-07 DIAGNOSIS — M71.30 OTHER BURSAL CYST, UNSPECIFIED SITE: Chronic | ICD-10-CM

## 2018-07-07 PROCEDURE — 71250 CT THORAX DX C-: CPT | Mod: 26

## 2018-07-07 PROCEDURE — 71250 CT THORAX DX C-: CPT

## 2018-07-09 ENCOUNTER — APPOINTMENT (OUTPATIENT)
Dept: PULMONOLOGY | Facility: CLINIC | Age: 53
End: 2018-07-09

## 2018-07-11 ENCOUNTER — NON-APPOINTMENT (OUTPATIENT)
Age: 53
End: 2018-07-11

## 2018-07-11 ENCOUNTER — APPOINTMENT (OUTPATIENT)
Dept: PULMONOLOGY | Facility: CLINIC | Age: 53
End: 2018-07-11
Payer: COMMERCIAL

## 2018-07-11 VITALS
WEIGHT: 170 LBS | HEIGHT: 64 IN | HEART RATE: 71 BPM | RESPIRATION RATE: 17 BRPM | SYSTOLIC BLOOD PRESSURE: 110 MMHG | OXYGEN SATURATION: 99 % | DIASTOLIC BLOOD PRESSURE: 78 MMHG | BODY MASS INDEX: 29.02 KG/M2

## 2018-07-11 DIAGNOSIS — D64.9 ANEMIA, UNSPECIFIED: ICD-10-CM

## 2018-07-11 PROCEDURE — 95012 NITRIC OXIDE EXP GAS DETER: CPT

## 2018-07-11 PROCEDURE — 94010 BREATHING CAPACITY TEST: CPT

## 2018-07-11 PROCEDURE — 96401 CHEMO ANTI-NEOPL SQ/IM: CPT

## 2018-07-11 PROCEDURE — 99214 OFFICE O/P EST MOD 30 MIN: CPT | Mod: 25

## 2018-07-11 RX ORDER — DEXAMETHASONE 4 MG/1
4 TABLET ORAL
Qty: 30 | Refills: 0 | Status: DISCONTINUED | COMMUNITY
Start: 2017-11-14 | End: 2018-07-11

## 2018-07-11 RX ORDER — DEXAMETHASONE INTENSOL 1 MG/ML
1 SOLUTION, CONCENTRATE ORAL
Qty: 90 | Refills: 0 | Status: DISCONTINUED | COMMUNITY
Start: 2017-12-04 | End: 2018-07-11

## 2018-07-11 RX ORDER — MEPOLIZUMAB 100 MG/ML
100 INJECTION, POWDER, FOR SOLUTION SUBCUTANEOUS
Qty: 0 | Refills: 0 | Status: COMPLETED | OUTPATIENT
Start: 2018-07-11

## 2018-07-11 RX ADMIN — MEPOLIZUMAB 1 MG: 100 INJECTION, POWDER, FOR SOLUTION SUBCUTANEOUS at 00:00

## 2018-08-13 ENCOUNTER — APPOINTMENT (OUTPATIENT)
Dept: PULMONOLOGY | Facility: CLINIC | Age: 53
End: 2018-08-13
Payer: COMMERCIAL

## 2018-08-13 VITALS
SYSTOLIC BLOOD PRESSURE: 120 MMHG | WEIGHT: 171 LBS | OXYGEN SATURATION: 96 % | HEIGHT: 63 IN | HEART RATE: 96 BPM | BODY MASS INDEX: 30.3 KG/M2 | DIASTOLIC BLOOD PRESSURE: 80 MMHG

## 2018-08-13 DIAGNOSIS — J45.909 UNSPECIFIED ASTHMA, UNCOMPLICATED: ICD-10-CM

## 2018-08-13 PROCEDURE — 96401 CHEMO ANTI-NEOPL SQ/IM: CPT

## 2018-08-13 PROCEDURE — 99214 OFFICE O/P EST MOD 30 MIN: CPT | Mod: 25

## 2018-08-13 RX ORDER — MEPOLIZUMAB 100 MG/ML
100 INJECTION, POWDER, FOR SOLUTION SUBCUTANEOUS
Qty: 0 | Refills: 0 | Status: COMPLETED | OUTPATIENT
Start: 2018-08-13

## 2018-08-13 RX ADMIN — MEPOLIZUMAB 1 MG: 100 INJECTION, POWDER, FOR SOLUTION SUBCUTANEOUS at 00:00

## 2018-08-22 DIAGNOSIS — R10.84 GENERALIZED ABDOMINAL PAIN: ICD-10-CM

## 2018-09-06 ENCOUNTER — APPOINTMENT (OUTPATIENT)
Dept: OBGYN | Facility: CLINIC | Age: 53
End: 2018-09-06
Payer: COMMERCIAL

## 2018-09-06 VITALS
SYSTOLIC BLOOD PRESSURE: 130 MMHG | OXYGEN SATURATION: 98 % | DIASTOLIC BLOOD PRESSURE: 80 MMHG | BODY MASS INDEX: 29.95 KG/M2 | WEIGHT: 169 LBS | HEIGHT: 63 IN | HEART RATE: 97 BPM

## 2018-09-06 DIAGNOSIS — Z85.3 PERSONAL HISTORY OF MALIGNANT NEOPLASM OF BREAST: ICD-10-CM

## 2018-09-06 PROCEDURE — 99396 PREV VISIT EST AGE 40-64: CPT

## 2018-09-06 RX ORDER — HYDROCODONE POLISTIREX AND CHLORPHENIRAMINE POLISTIREX 10; 8 MG/5ML; MG/5ML
10-8 SUSPENSION, EXTENDED RELEASE ORAL
Qty: 240 | Refills: 0 | Status: DISCONTINUED | COMMUNITY
Start: 2017-12-18 | End: 2018-09-06

## 2018-09-06 RX ORDER — PEN NEEDLE, DIABETIC 29 G X1/2"
32G X 4 MM NEEDLE, DISPOSABLE MISCELLANEOUS
Qty: 100 | Refills: 0 | Status: DISCONTINUED | COMMUNITY
Start: 2017-08-21 | End: 2018-09-06

## 2018-09-06 RX ORDER — BUDESONIDE 0.5 MG/2ML
0.5 INHALANT ORAL
Qty: 180 | Refills: 0 | Status: DISCONTINUED | COMMUNITY
Start: 2018-02-02 | End: 2018-09-06

## 2018-09-06 RX ORDER — LINEZOLID 600 MG/1
600 TABLET, FILM COATED ORAL
Qty: 28 | Refills: 0 | Status: DISCONTINUED | COMMUNITY
Start: 2018-01-29 | End: 2018-09-06

## 2018-09-06 RX ORDER — LIDOCAINE HYDROCHLORIDE 20 MG/ML
2 SOLUTION OROPHARYNGEAL
Qty: 100 | Refills: 0 | Status: DISCONTINUED | COMMUNITY
Start: 2017-12-01 | End: 2018-09-06

## 2018-09-06 RX ORDER — IRON POLYSACCHARIDE COMPLEX 150 MG
150 CAPSULE ORAL
Qty: 30 | Refills: 0 | Status: DISCONTINUED | COMMUNITY
Start: 2017-12-01 | End: 2018-09-06

## 2018-09-06 RX ORDER — NYSTATIN 100000 [USP'U]/ML
100000 SUSPENSION ORAL
Qty: 160 | Refills: 0 | Status: DISCONTINUED | COMMUNITY
Start: 2017-12-04 | End: 2018-09-06

## 2018-09-06 RX ORDER — LIDOCAINE AND PRILOCAINE 25; 25 MG/G; MG/G
2.5-2.5 CREAM TOPICAL
Qty: 30 | Refills: 0 | Status: DISCONTINUED | COMMUNITY
Start: 2017-12-19 | End: 2018-09-06

## 2018-09-06 RX ORDER — LEVALBUTEROL HYDROCHLORIDE 0.31 MG/3ML
0.31 SOLUTION RESPIRATORY (INHALATION) EVERY 6 HOURS
Qty: 120 | Refills: 5 | Status: DISCONTINUED | COMMUNITY
Start: 2018-07-11 | End: 2018-09-06

## 2018-09-06 RX ORDER — OLANZAPINE 5 MG/1
5 TABLET, FILM COATED ORAL
Qty: 16 | Refills: 0 | Status: DISCONTINUED | COMMUNITY
Start: 2017-11-14 | End: 2018-09-06

## 2018-09-06 RX ORDER — IPRATROPIUM BROMIDE 0.5 MG/2.5ML
0.02 SOLUTION RESPIRATORY (INHALATION)
Qty: 300 | Refills: 0 | Status: DISCONTINUED | COMMUNITY
Start: 2018-02-02 | End: 2018-09-06

## 2018-09-06 RX ORDER — DICLOFENAC SODIUM 75 MG/1
75 TABLET, DELAYED RELEASE ORAL
Qty: 14 | Refills: 0 | Status: DISCONTINUED | COMMUNITY
Start: 2017-10-30 | End: 2018-09-06

## 2018-09-07 LAB — HPV HIGH+LOW RISK DNA PNL CVX: NOT DETECTED

## 2018-09-11 LAB — CYTOLOGY CVX/VAG DOC THIN PREP: NORMAL

## 2018-09-13 ENCOUNTER — NON-APPOINTMENT (OUTPATIENT)
Age: 53
End: 2018-09-13

## 2018-09-13 ENCOUNTER — APPOINTMENT (OUTPATIENT)
Dept: PULMONOLOGY | Facility: CLINIC | Age: 53
End: 2018-09-13
Payer: COMMERCIAL

## 2018-09-13 VITALS
RESPIRATION RATE: 17 BRPM | HEART RATE: 94 BPM | OXYGEN SATURATION: 100 % | DIASTOLIC BLOOD PRESSURE: 62 MMHG | BODY MASS INDEX: 29.95 KG/M2 | WEIGHT: 169 LBS | HEIGHT: 63 IN | SYSTOLIC BLOOD PRESSURE: 125 MMHG

## 2018-09-13 PROCEDURE — 94010 BREATHING CAPACITY TEST: CPT

## 2018-09-13 PROCEDURE — 99214 OFFICE O/P EST MOD 30 MIN: CPT | Mod: 25

## 2018-09-13 PROCEDURE — 95012 NITRIC OXIDE EXP GAS DETER: CPT

## 2018-09-13 PROCEDURE — 96401 CHEMO ANTI-NEOPL SQ/IM: CPT

## 2018-09-13 RX ORDER — MEPOLIZUMAB 100 MG/ML
100 INJECTION, POWDER, FOR SOLUTION SUBCUTANEOUS
Qty: 0 | Refills: 0 | Status: COMPLETED | OUTPATIENT
Start: 2018-09-13

## 2018-09-13 RX ADMIN — MEPOLIZUMAB 1 MG: 100 INJECTION, POWDER, FOR SOLUTION SUBCUTANEOUS at 00:00

## 2018-09-13 NOTE — PHYSICAL EXAM
[General Appearance - Well Developed] : well developed [Normal Appearance] : normal appearance [Well Groomed] : well groomed [General Appearance - Well Nourished] : well nourished [No Deformities] : no deformities [General Appearance - In No Acute Distress] : no acute distress [Normal Conjunctiva] : the conjunctiva exhibited no abnormalities [Eyelids - No Xanthelasma] : the eyelids demonstrated no xanthelasmas [Normal Oropharynx] : normal oropharynx [II] : II [Neck Appearance] : the appearance of the neck was normal [Neck Cervical Mass (___cm)] : no neck mass was observed [Jugular Venous Distention Increased] : there was no jugular-venous distention [Thyroid Diffuse Enlargement] : the thyroid was not enlarged [Thyroid Nodule] : there were no palpable thyroid nodules [Heart Rate And Rhythm] : heart rate and rhythm were normal [Heart Sounds] : normal S1 and S2 [Murmurs] : no murmurs present [Respiration, Rhythm And Depth] : normal respiratory rhythm and effort [Exaggerated Use Of Accessory Muscles For Inspiration] : no accessory muscle use [Auscultation Breath Sounds / Voice Sounds] : lungs were clear to auscultation bilaterally [Abdomen Soft] : soft [Abdomen Tenderness] : non-tender [Abdomen Mass (___ Cm)] : no abdominal mass palpated [Abnormal Walk] : normal gait [Gait - Sufficient For Exercise Testing] : the gait was sufficient for exercise testing [Nail Clubbing] : no clubbing of the fingernails [Cyanosis, Localized] : no localized cyanosis [Petechial Hemorrhages (___cm)] : no petechial hemorrhages [Skin Color & Pigmentation] : normal skin color and pigmentation [] : no rash [No Venous Stasis] : no venous stasis [Skin Lesions] : no skin lesions [No Skin Ulcers] : no skin ulcer [No Xanthoma] : no  xanthoma was observed [Deep Tendon Reflexes (DTR)] : deep tendon reflexes were 2+ and symmetric [Sensation] : the sensory exam was normal to light touch and pinprick [No Focal Deficits] : no focal deficits [Oriented To Time, Place, And Person] : oriented to person, place, and time [Impaired Insight] : insight and judgment were intact [Affect] : the affect was normal [FreeTextEntry1] : I:E ratio 1:3; clear

## 2018-09-13 NOTE — REASON FOR VISIT
[Follow-Up] : a follow-up visit [FreeTextEntry1] : abnormal chest CT, BOOP, cough, eosinophilia, asthma

## 2018-09-13 NOTE — PROCEDURE
[FreeTextEntry1] : PFT spi reveals normal flows, FEV1 is 2.27L which is 85% of predicted, normal flow volume loop\par \par FENO was 16; a normal value being less than 25\par Fractional exhaled nitric oxide (FENO) is regarded as a simple, noninvasive method for assessing eosinophilic airway inflammation. Produced by a variety of cells within the lung, nitric oxide (NO) concentrations are generally low in healthy individuals. However, high concentrations of NO appear to be involved in nonspecific host defense mechanisms and chronic inflammatory diseases such as asthma. The American Thor[employer] Society (ATS) therefore has recommended using FENO to aid in the diagnosis and monitoring of eosinophilic airway inflammation and asthma, and for identifying steroid responsive individuals whose chronic respiratory symptoms may be caused by airway inflammation. \par \par \par

## 2018-09-13 NOTE — ASSESSMENT
[FreeTextEntry1] : Ms. Kunz is a 53 year old female with a history of chronic sinus disease, GERD, Hashimoto's thyroiditis, BOOP, IgG deficiency, recent iron deficient anemia, coming in for pulmonary reevaluation. She is s/p mastectomy and s/p hospitalization for "expander" Staph infection. She is now recovered and s/p Taxol, Herceptin and Perjetta for her breast cancer. She is now on radiation therapy, and presents to the office today for a Nucala injection and follow up visit. 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 \par \par problem 1: BOOP\par -diagnosed by right VATs \par -follow up chest CT in 6/2019\par -s/p Zithromax \par \par \par problem 2: r/o diaphragm dysfunction\par -fluoroscopy of the diaphragm was all normal\par \par problem 3: severe persistent asthma\par -continue Breo Ellipta 200 1 inhalation QD \par -continue levalbuterol 0.31 by the nebulizer up to QID\par -followed by Atrovent by the nebulizer up to QID\par -followed by Pulmicort 0.5 by the nebulizer BID \par \par -Asthma is  believed to be caused by inherited (genetic) and environmental factor, but its exact cause is unknown. Asthma may be triggered by allergens, lung infections, or irritants in the air. Asthma triggers are different for each person\par -Inhaler technique reviewed as well as oral hygiene techniques reviewed with patient. Avoidance of cold air, extremes of temperature, rescue inhaler should be used before exercise. Order of medication reviewed with patient. Recommended use of a cool mist humidifier in the bedroom. \par \par problem 4: allergic rhinitis\par -recommended Xlear or nasal saline\par -recommended Sinugator \par -Environmental measures for allergies were encouraged including mattress and pillow cover, air purifier, and environmental controls. \par \par problem 5: GERD/LPR\par - Zantac 300 mg QHS \par -Rule of 2s: avoid eating too much, eating too late, eating too spicy, eating two hours before bed\par -Things to avoid including overeating, spicy foods, tight clothing, eating within three hours of bed, this list is not all inclusive. \par -For treatment of reflux, possible options discussed including diet control, H2 blockers, PPIs, as well as coating motility agents discussed as treatment options. Timing of meals and proximity of last meal to sleep were discussed. If symptoms persist, a formal gastrointestinal evaluation is needed. \par \par problem 6: eosinophilic  asthma\par -Nucala 100 mg Q monthly, given today in the abdomen \par \par -The safety and efficacy of Nucala was established in three double-blind, randomized, placebo-controlled trials in patients with severe asthma. Compared to a placebo, patients with severe asthma receiving Nucala had fewer exacerbation requiring hospitalization and/or emergency department visits, and a longer time to first exacerbation.  In addition, patients with severe asthma receiving Nucala experienced greater reductions in their daily maintenance oral corticosteroid dose, while maintaining asthma control compared with patients receiving placebo. Treatment with Nucala did not result in a significant improvement in lung function, as measured by the volume of air exhaled by patients in one second. The most common side effects include: headache, injection site reactions, back pain, weakness, and fatigue; hypersensitivity reactions can occur within hours or days including swelling of the face, mouth, and tongue, fainting, dizziness, hives, breathing problems, and rash; herpes zoster infections have occurred. The drug is a monoclonal antibody that inhibits interleukin -5 which helps regular eosinophils, a type of white blood cell that contributes to asthma. The over-production of eosinophils can cause inflammation in the lungs, increasing the frequency of asthma attacks. Patients must also take other medications, including high dose inhaled corticosteroids and at least one additional asthma drug. \par \par problem 7: low immunity \par -continue to use Gammagard weekly \par -recommended early intervention  \par -s/p Prevnar 13 vaccine \par \par problem 8: abnormal chest CT\par -repeat CT in June 2019, prescription given July 2018\par \par problem 9: health maintenance\par -recommended a yearly flu shot after October 15\par -recommended strep pneumonia vaccines: Prevnar-13 vaccine, followed by Pneumo vaccine 23 on year following\par -recommended early intervention for URIs\par -recommended osteoporosis evaluations\par -recommended early dermatological evaluations\par -recommended after the age of 50 to the age of 70, colonoscopy every 5 years\par \par F/U in 1 month \par She is encouraged to call with any changes, concerns, or questions

## 2018-09-13 NOTE — HISTORY OF PRESENT ILLNESS
[FreeTextEntry1] :  is a 54 y/o female who presents for a follow up visit for abnormal chest CT, BOOP, cough, eosinophilia, asthma. Her chief complaint is nucala shot.\par \par -she notes that she has started coughing in mid july\par -she states that the coughing does not occur in the morning, usually happens mid-day/night\par -she notes that she has just finished radiation which may have affected her lungs\par -she notes that she is now tolerating her medications better\par -she notes that she is sleeping well\par -she notes that she does not sleep\par -she states that she has gained 9 pounds\par -she notes that it is hard for her to exercise outside due to the humidity \par -she notes that her bowels are regular\par -she states that she takes medications for muscle pains which she started a week after radiation\par -she notes that her exercise consists of climbing stairs\par -she notes that she is not taking any new medications,vitamins or supplements\par -she notes that on 10/10 she is going for treatment\par -she denies any headaches, nausea, vomiting, fever, chills, sweats, chest pain, chest pressure, diarrhea, constipation, dysphagia, dizziness, leg swelling, leg pain, itchy eyes, itchy ears, heartburn, reflux, or sour taste in the mouth.\par \par

## 2018-10-12 ENCOUNTER — APPOINTMENT (OUTPATIENT)
Dept: PULMONOLOGY | Facility: CLINIC | Age: 53
End: 2018-10-12
Payer: COMMERCIAL

## 2018-10-12 VITALS
WEIGHT: 168 LBS | RESPIRATION RATE: 17 BRPM | DIASTOLIC BLOOD PRESSURE: 80 MMHG | HEART RATE: 94 BPM | BODY MASS INDEX: 29.77 KG/M2 | HEIGHT: 63 IN | SYSTOLIC BLOOD PRESSURE: 120 MMHG | OXYGEN SATURATION: 100 %

## 2018-10-12 PROCEDURE — 99214 OFFICE O/P EST MOD 30 MIN: CPT | Mod: 25

## 2018-10-12 PROCEDURE — 96401 CHEMO ANTI-NEOPL SQ/IM: CPT

## 2018-10-12 RX ORDER — MEPOLIZUMAB 100 MG/ML
100 INJECTION, POWDER, FOR SOLUTION SUBCUTANEOUS
Qty: 0 | Refills: 0 | Status: COMPLETED | OUTPATIENT
Start: 2018-10-12

## 2018-10-12 RX ADMIN — MEPOLIZUMAB 1 MG: 100 INJECTION, POWDER, FOR SOLUTION SUBCUTANEOUS at 00:00

## 2018-10-12 NOTE — ASSESSMENT
[FreeTextEntry1] : Ms. Kunz is a 53 year old female with a history of chronic sinus disease, GERD, Hashimoto's thyroiditis, BOOP, IgG deficiency, recent iron deficient anemia, coming in for pulmonary reevaluation. She is s/p mastectomy and s/p hospitalization for "expander" Staph infection. She is now recovered and s/p Taxol, Herceptin and Perjetta for her breast cancer. She is now s/p radiation therapy, and presents to the office today for a Nucala injection and follow up visit. She is currently stable from a pulmonary perspective. 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 Asthma may be triggered by allergens, lung infections, or irritants in the air. Asthma triggers are different for each person\par -Inhaler technique reviewed as well as oral hygiene techniques reviewed with patient. Avoidance of cold air, extremes of temperature, rescue inhaler should be used before exercise. Order of medication reviewed with patient. Recommended use of a cool mist humidifier in the bedroom. \par \par problem 3: eosinophilic  asthma\par -Nucala 100 mg Q monthly, given today in the abdomen \par -The safety and efficacy of Nucala was established in three double-blind, randomized, placebo-controlled trials in patients with severe asthma. Compared to a placebo, patients with severe asthma receiving Nucala had fewer exacerbation requiring hospitalization and/or emergency department visits, and a longer time to first exacerbation.  In addition, patients with severe asthma receiving Nucala experienced greater reductions in their daily maintenance oral corticosteroid dose, while maintaining asthma control compared with patients receiving placebo. Treatment with Nucala did not result in a significant improvement in lung function, as measured by the volume of air exhaled by patients in one second. The most common side effects include: headache, injection site reactions, back pain, weakness, and fatigue; hypersensitivity reactions can occur within hours or days including swelling of the face, mouth, and tongue, fainting, dizziness, hives, breathing problems, and rash; herpes zoster infections have occurred. The drug is a monoclonal antibody that inhibits interleukin -5 which helps regular eosinophils, a type of white blood cell that contributes to asthma. The over-production of eosinophils can cause inflammation in the lungs, increasing the frequency of asthma attacks. Patients must also take other medications, including high dose inhaled corticosteroids and at least one additional asthma drug. \par \par problem 4: allergic rhinitis\par -recommended Xlear or nasal saline\par -recommended Sinugator \par -Environmental measures for allergies were encouraged including mattress and pillow cover, air purifier, and environmental controls. \par \par problem 5: GERD/LPR\par - Zantac 300 mg QHS \par -Rule of 2s: avoid eating too much, eating too late, eating too spicy, eating two hours before bed\par -Things to avoid including overeating, spicy foods, tight clothing, eating within three hours of bed, this list is not all inclusive. \par -For treatment of reflux, possible options discussed including diet control, H2 blockers, PPIs, as well as coating motility agents discussed as treatment options. Timing of meals and proximity of last meal to sleep were discussed. If symptoms persist, a formal gastrointestinal evaluation is needed. \par \par problem 6: r/o diaphragm dysfunction\par -fluoroscopy of the diaphragm was all normal\par \par problem 7: low immunity \par -continue to use Gammagard weekly \par -recommended early intervention  \par -s/p Prevnar 13 vaccine \par \par problem 8: abnormal chest CT\par -repeat CT in June 2019, prescription given July 2018\par \par problem 9: health maintenance\par -s/p 2018 flu shot\par -s/p Shingrix vaccine\par -recommended strep pneumonia vaccines: Prevnar-13 vaccine, followed by Pneumo vaccine 23 on year following\par -recommended early intervention for URIs\par -recommended osteoporosis evaluations\par -recommended early dermatological evaluations\par -recommended after the age of 50 to the age of 70, colonoscopy every 5 years\par \par F/U in 1 month \par She is encouraged to call with any changes, concerns, or questions

## 2018-10-12 NOTE — REASON FOR VISIT
[Follow-Up] : a follow-up visit [FreeTextEntry1] : abnormal chest CT, asthma, BOOP, cough, eosinophilic asthma, lung nodules, and sinusitis

## 2018-10-12 NOTE — HISTORY OF PRESENT ILLNESS
[FreeTextEntry1] : Ms. Kunz is a 53 year old female presenting to the office for a follow up visit for abnormal chest CT, asthma, BOOP, cough, eosinophilic asthma, lung nodules, and sinusitis. Her chief complaint is difficulty breathing. \par -She notes she has been feeling great\par -She reports she has been using her inhalers daily\par -SHe notes she has been taking IgE and vitamin D supplements\par -She states she does not exercise but is very active and does a lot of walking at work\par -she reports her chemotherapy has been completed\par -She notes she was coughing after chemotherapy but it has resolved since\par -She notes her sleep has been good\par -She denies chest pain or pressure, diarrhea, constipation, dizziness, sour taste in mouth, dysphagia,

## 2018-10-12 NOTE — ADDENDUM
[FreeTextEntry1] : Documented by Myriam Maria acting as a scribe for Dr. Munir Malin on 10/12/2018.\par \par All medical record entries made by the Scribe were at my, Dr. Munir Malin's, direction and personally dictated by me on 10//12/2018. I have reviewed the chart and agree that the record accurately reflects my personal performance of the history, physical exam, assessment and plan. I have also personally directed, reviewed, and agree with the discharge instructions.

## 2018-11-12 ENCOUNTER — APPOINTMENT (OUTPATIENT)
Dept: PULMONOLOGY | Facility: CLINIC | Age: 53
End: 2018-11-12
Payer: COMMERCIAL

## 2018-11-12 VITALS
DIASTOLIC BLOOD PRESSURE: 70 MMHG | BODY MASS INDEX: 29.77 KG/M2 | HEART RATE: 98 BPM | OXYGEN SATURATION: 98 % | RESPIRATION RATE: 17 BRPM | SYSTOLIC BLOOD PRESSURE: 110 MMHG | WEIGHT: 168 LBS | HEIGHT: 63 IN

## 2018-11-12 PROCEDURE — 99214 OFFICE O/P EST MOD 30 MIN: CPT

## 2018-11-12 RX ORDER — MEPOLIZUMAB 100 MG/ML
100 INJECTION, POWDER, FOR SOLUTION SUBCUTANEOUS
Qty: 0 | Refills: 0 | Status: COMPLETED | OUTPATIENT
Start: 2018-11-12

## 2018-11-12 RX ADMIN — MEPOLIZUMAB 1 MG: 100 INJECTION, POWDER, FOR SOLUTION SUBCUTANEOUS at 00:00

## 2018-11-12 NOTE — ADDENDUM
[FreeTextEntry1] : Documented by Myriam Maria acting as a scribe for Dr. Munir Malin on 11/12/2018.\par \par All medical record entries made by the Scribe were at my, Dr. Munir Malin's, direction and personally dictated by me on 11/12/2018. I have reviewed the chart and agree that the record accurately reflects my personal performance of the history, physical exam, assessment and plan. I have also personally directed, reviewed, and agree with the discharge instructions.

## 2018-11-12 NOTE — REASON FOR VISIT
[Follow-Up] : a follow-up visit [FreeTextEntry1] : s/p URI, abnormal chest CT, asthma, BOOP, cough, eosinophilic asthma, and lung nodule

## 2018-11-12 NOTE — ASSESSMENT
[FreeTextEntry1] : Ms. Kunz is a 53 year old female with a history of chronic sinus disease, GERD, Hashimoto's thyroiditis, BOOP, IgG deficiency, recent iron deficient anemia, coming in for pulmonary reevaluation. She is s/p mastectomy and s/p hospitalization for "expander" Staph infection. She is now recovered and s/p Taxol, Herceptin and Perjetta for her breast cancer. She is now s/p radiation therapy, and presents to the office today for a Nucala injection and follow up visit. She is currently stable from a pulmonary perspective, s/p URI.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 \par \par problem 1: BOOP\par -diagnosed by right VATs \par -follow up chest CT in 6/2019\par -s/p Zithromax \par \par problem 2: severe persistent asthma\par -continue Breo Ellipta 200 1 inhalation QD \par -continue levalbuterol 0.31 by the nebulizer up to QID\par -followed by Atrovent by the nebulizer up to QID\par -followed by Pulmicort 0.5 by the nebulizer BID \par -Asthma is  believed to be caused by inherited (genetic) and environmental factor, but its exact cause is unknown. Asthma may be triggered by allergens, lung infections, or irritants in the air. Asthma triggers are different for each person\par -Inhaler technique reviewed as well as oral hygiene techniques reviewed with patient. Avoidance of cold air, extremes of temperature, rescue inhaler should be used before exercise. Order of medication reviewed with patient. Recommended use of a cool mist humidifier in the bedroom. \par \par problem 3: eosinophilic  asthma\par -Nucala 100 mg Q monthly, given today in the abdomen \par -The safety and efficacy of Nucala was established in three double-blind, randomized, placebo-controlled trials in patients with severe asthma. Compared to a placebo, patients with severe asthma receiving Nucala had fewer exacerbation requiring hospitalization and/or emergency department visits, and a longer time to first exacerbation.  In addition, patients with severe asthma receiving Nucala experienced greater reductions in their daily maintenance oral corticosteroid dose, while maintaining asthma control compared with patients receiving placebo. Treatment with Nucala did not result in a significant improvement in lung function, as measured by the volume of air exhaled by patients in one second. The most common side effects include: headache, injection site reactions, back pain, weakness, and fatigue; hypersensitivity reactions can occur within hours or days including swelling of the face, mouth, and tongue, fainting, dizziness, hives, breathing problems, and rash; herpes zoster infections have occurred. The drug is a monoclonal antibody that inhibits interleukin -5 which helps regular eosinophils, a type of white blood cell that contributes to asthma. The over-production of eosinophils can cause inflammation in the lungs, increasing the frequency of asthma attacks. Patients must also take other medications, including high dose inhaled corticosteroids and at least one additional asthma drug. \par \par problem 4: allergic rhinitis\par -recommended Xlear or nasal saline\par -recommended Sinugator \par -Environmental measures for allergies were encouraged including mattress and pillow cover, air purifier, and environmental controls. \par \par problem 5: GERD/LPR\par - Zantac 300 mg QHS \par -Rule of 2s: avoid eating too much, eating too late, eating too spicy, eating two hours before bed\par -Things to avoid including overeating, spicy foods, tight clothing, eating within three hours of bed, this list is not all inclusive. \par -For treatment of reflux, possible options discussed including diet control, H2 blockers, PPIs, as well as coating motility agents discussed as treatment options. Timing of meals and proximity of last meal to sleep were discussed. If symptoms persist, a formal gastrointestinal evaluation is needed. \par \par problem 6: r/o diaphragm dysfunction\par -fluoroscopy of the diaphragm was all normal\par \par problem 7: low immunity \par -continue to use Gammagard weekly \par -recommended early intervention  \par -s/p Prevnar 13 vaccine \par \par problem 8: abnormal chest CT\par -repeat CT in June 2019, prescription given July 2018\par \par problem 9: health maintenance\par -s/p 2018 flu shot\par -s/p 1st Shingrix vaccine, pending 2nd shot\par -recommended strep pneumonia vaccines: Prevnar-13 vaccine, followed by Pneumo vaccine 23 on year following\par -recommended early intervention for URIs\par -recommended osteoporosis evaluations\par -recommended early dermatological evaluations\par -recommended after the age of 50 to the age of 70, colonoscopy every 5 years\par \par F/U in 1 month \par She is encouraged to call with any changes, concerns, or questions

## 2018-11-12 NOTE — PHYSICAL EXAM

## 2018-11-12 NOTE — HISTORY OF PRESENT ILLNESS
[FreeTextEntry1] : Ms. Kunz is a 53 year old female presenting to the office for a follow up visit for abnormal chest CT, asthma, BOOP, cough, eosinophilic asthma, and lung nodules. Her chief complaint is difficulty breathing. \par -she notes she has been feeling better after taking her prednisone taper\par -She notes she has been sleeping well\par -SHe reports she has been exercising with stairs\par -She notes she has been sleeping about 5 hours a night\par -She states she has been eating well and her weight is stable\par -She reports her bowels are regular\par -She states her sense of smell and taste are good\par -She denies chest pain or pressure, diarrhea, constipation, dizziness, dysphagia, sour taste in mouth,

## 2018-12-12 ENCOUNTER — APPOINTMENT (OUTPATIENT)
Dept: PULMONOLOGY | Facility: CLINIC | Age: 53
End: 2018-12-12
Payer: COMMERCIAL

## 2018-12-12 VITALS
HEIGHT: 63 IN | OXYGEN SATURATION: 100 % | WEIGHT: 168 LBS | BODY MASS INDEX: 29.77 KG/M2 | RESPIRATION RATE: 17 BRPM | SYSTOLIC BLOOD PRESSURE: 120 MMHG | HEART RATE: 94 BPM | DIASTOLIC BLOOD PRESSURE: 80 MMHG

## 2018-12-12 PROCEDURE — 96401 CHEMO ANTI-NEOPL SQ/IM: CPT

## 2018-12-12 PROCEDURE — 99214 OFFICE O/P EST MOD 30 MIN: CPT | Mod: 25

## 2018-12-12 RX ORDER — MEPOLIZUMAB 100 MG/ML
100 INJECTION, POWDER, FOR SOLUTION SUBCUTANEOUS
Qty: 0 | Refills: 0 | Status: COMPLETED | OUTPATIENT
Start: 2018-12-12

## 2018-12-12 RX ADMIN — MEPOLIZUMAB 1 MG: 100 INJECTION, POWDER, FOR SOLUTION SUBCUTANEOUS at 00:00

## 2018-12-12 NOTE — ASSESSMENT
[FreeTextEntry1] : Ms. Kunz is a 53 year old female with a history of chronic sinus disease, GERD, Hashimoto's thyroiditis, BOOP, IgG deficiency, recent iron deficient anemia, coming in for pulmonary reevaluation. She is s/p mastectomy and s/p hospitalization for "expander" Staph infection. She is now recovered and s/p Taxol, Herceptin and Perjetta for her breast cancer. She is now s/p radiation therapy, and presents to the office today for a Nucala injection and follow up visit. She is currently stable from a pulmonary perspective.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 \par \par problem 1: BOOP\par -diagnosed by right VATs \par -follow up chest CT in 6/2019\par -s/p Zithromax \par \par problem 2: severe persistent asthma\par -continue Breo Ellipta 200 1 inhalation QD \par -continue levalbuterol 0.31 by the nebulizer up to QID\par -followed by Atrovent by the nebulizer up to QID\par -followed by Pulmicort 0.5 by the nebulizer BID \par -Asthma is  believed to be caused by inherited (genetic) and environmental factor, but its exact cause is unknown. Asthma may be triggered by allergens, lung infections, or irritants in the air. Asthma triggers are different for each person\par -Inhaler technique reviewed as well as oral hygiene techniques reviewed with patient. Avoidance of cold air, extremes of temperature, rescue inhaler should be used before exercise. Order of medication reviewed with patient. Recommended use of a cool mist humidifier in the bedroom. \par \par problem 3: eosinophilic  asthma\par -Nucala 100 mg Q monthly, given today in the abdomen \par -The safety and efficacy of Nucala was established in three double-blind, randomized, placebo-controlled trials in patients with severe asthma. Compared to a placebo, patients with severe asthma receiving Nucala had fewer exacerbation requiring hospitalization and/or emergency department visits, and a longer time to first exacerbation.  In addition, patients with severe asthma receiving Nucala experienced greater reductions in their daily maintenance oral corticosteroid dose, while maintaining asthma control compared with patients receiving placebo. Treatment with Nucala did not result in a significant improvement in lung function, as measured by the volume of air exhaled by patients in one second. The most common side effects include: headache, injection site reactions, back pain, weakness, and fatigue; hypersensitivity reactions can occur within hours or days including swelling of the face, mouth, and tongue, fainting, dizziness, hives, breathing problems, and rash; herpes zoster infections have occurred. The drug is a monoclonal antibody that inhibits interleukin -5 which helps regular eosinophils, a type of white blood cell that contributes to asthma. The over-production of eosinophils can cause inflammation in the lungs, increasing the frequency of asthma attacks. Patients must also take other medications, including high dose inhaled corticosteroids and at least one additional asthma drug. \par \par problem 4: allergic rhinitis\par -recommended Xlear or nasal saline, Navage.\par -recommended Sinugator \par -Environmental measures for allergies were encouraged including mattress and pillow cover, air purifier, and environmental controls. \par \par problem 5: GERD/LPR\par - Zantac 300 mg QHS \par - Recommended Apple cider vineger tablets. \par -Rule of 2s: avoid eating too much, eating too late, eating too spicy, eating two hours before bed\par -Things to avoid including overeating, spicy foods, tight clothing, eating within three hours of bed, this list is not all inclusive. \par -For treatment of reflux, possible options discussed including diet control, H2 blockers, PPIs, as well as coating motility agents discussed as treatment options. Timing of meals and proximity of last meal to sleep were discussed. If symptoms persist, a formal gastrointestinal evaluation is needed. \par \par problem 6: r/o diaphragm dysfunction\par -fluoroscopy of the diaphragm was all normal\par \par problem 7: low immunity \par -continue to use Gammagard weekly \par -recommended early intervention  \par -s/p Prevnar 13 vaccine \par \par problem 8: abnormal chest CT\par -repeat CT in June 2019, prescription given July 2018\par \par problem 9: health maintenance\par -s/p 2018 flu shot\par -s/p 1st Shingrix vaccine, s/p 2nd shot\par -recommended strep pneumonia vaccines: Prevnar-13 vaccine, followed by Pneumo vaccine 23 on year following\par -recommended early intervention for URIs\par -recommended osteoporosis evaluations\par -recommended early dermatological evaluations\par -recommended after the age of 50 to the age of 70, colonoscopy every 5 years\par \par F/U in 1 month \par She is encouraged to call with any changes, concerns, or questions

## 2018-12-12 NOTE — HISTORY OF PRESENT ILLNESS
[FreeTextEntry1] : Ms. Kunz is a 53 year old female presenting to the office for a follow up visit for Nucala injection, asthma, BOOP, cough, eosinophilic asthma, and lung nodules. Her chief complaint is difficulty breathing. \par - She is doing well overall. \par - She notes epigastric burning sensation and intermittent belching. \par - Her sinuses remain clear, however notes occasional rhinorrhea. Claritin PRN\par - She has been working outdoors, able to tolerate the cold, covers her mouth and nose. \par - She received the second Shingrix vaccine, notes a febrile episode post vaccine. \par - She was seen by her Plastic Surgeon, Dr. Jiang - requesting a surgical clearance for reconstruction surgery. \par - denies any headaches, chest pain, chest pressure, diarrhea, constipation, dysphagia, dizziness, leg swelling, leg pain, itchy eyes, itchy ears, heartburn, reflux, or sour taste in the mouth.\par

## 2018-12-12 NOTE — ADDENDUM
[FreeTextEntry1] : Documented by Autumn Torres acting as a scribe for Dr. Munir Malin on 12/12/18.\par \par All medical record entries made by the scribe, Autumn Torres, were at my, Dr. Munir Malin's, direction and personally dictated by me on 12/12/18. I have reviewed the chart and agree that the record accurately reflects my personal performance of the history, physical exam, assessment and plan. I have also personally directed, reviewed, and agree with the discharge instructions.\par

## 2019-01-11 ENCOUNTER — APPOINTMENT (OUTPATIENT)
Dept: PULMONOLOGY | Facility: CLINIC | Age: 54
End: 2019-01-11
Payer: COMMERCIAL

## 2019-01-11 VITALS
HEART RATE: 118 BPM | SYSTOLIC BLOOD PRESSURE: 130 MMHG | DIASTOLIC BLOOD PRESSURE: 69 MMHG | OXYGEN SATURATION: 100 % | RESPIRATION RATE: 17 BRPM | BODY MASS INDEX: 29.77 KG/M2 | WEIGHT: 168 LBS | HEIGHT: 63 IN

## 2019-01-11 PROCEDURE — 99214 OFFICE O/P EST MOD 30 MIN: CPT | Mod: 25

## 2019-01-11 RX ADMIN — MEPOLIZUMAB 1 MG: 100 INJECTION, POWDER, FOR SOLUTION SUBCUTANEOUS at 00:00

## 2019-01-11 NOTE — ASSESSMENT
[FreeTextEntry1] : Ms. Kunz is a 53 year old female with a history of chronic sinus disease, GERD, Hashimoto's thyroiditis, BOOP, IgG deficiency, recent iron deficient anemia, coming in for pulmonary reevaluation. She is s/p mastectomy and s/p hospitalization for "expander" Staph infection. She is now recovered and s/p Taxol, Herceptin and Perjetta for her breast cancer. She is now s/p radiation therapy, and presents to the office today for a Nucala injection and follow up visit. She is currently here for acute pharyngitis/sinusitis 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 \par \par \par Problem 1 Acute Pharyngitis/Sinusitis\par -add Zithromax 500mg qD for 7 days\par -recommended Fisherman's Friend \par -recommended Alkolol gargol\par \par problem 2: BOOP\par -diagnosed by right VATs \par -follow up chest CT in 6/2019\par -s/p Zithromax \par \par problem 3: severe persistent asthma\par -continue Breo Ellipta 200 1 inhalation QD \par -continue levalbuterol 0.31 by the nebulizer up to QID\par -followed by Atrovent by the nebulizer up to QID\par -followed by Pulmicort 0.5 by the nebulizer BID \par -Asthma is  believed to be caused by inherited (genetic) and environmental factor, but its exact cause is unknown. Asthma may be triggered by allergens, lung infections, or irritants in the air. Asthma triggers are different for each person\par -Inhaler technique reviewed as well as oral hygiene techniques reviewed with patient. Avoidance of cold air, extremes of temperature, rescue inhaler should be used before exercise. Order of medication reviewed with patient. Recommended use of a cool mist humidifier in the bedroom. \par \par problem 4: eosinophilic  asthma\par -Nucala 100 mg Q monthly, given today in the abdomen \par -The safety and efficacy of Nucala was established in three double-blind, randomized, placebo-controlled trials in patients with severe asthma. Compared to a placebo, patients with severe asthma receiving Nucala had fewer exacerbation requiring hospitalization and/or emergency department visits, and a longer time to first exacerbation.  In addition, patients with severe asthma receiving Nucala experienced greater reductions in their daily maintenance oral corticosteroid dose, while maintaining asthma control compared with patients receiving placebo. Treatment with Nucala did not result in a significant improvement in lung function, as measured by the volume of air exhaled by patients in one second. The most common side effects include: headache, injection site reactions, back pain, weakness, and fatigue; hypersensitivity reactions can occur within hours or days including swelling of the face, mouth, and tongue, fainting, dizziness, hives, breathing problems, and rash; herpes zoster infections have occurred. The drug is a monoclonal antibody that inhibits interleukin -5 which helps regular eosinophils, a type of white blood cell that contributes to asthma. The over-production of eosinophils can cause inflammation in the lungs, increasing the frequency of asthma attacks. Patients must also take other medications, including high dose inhaled corticosteroids and at least one additional asthma drug. \par \par problem 5: allergic rhinitis\par -recommended Xlear or nasal saline, Navage.\par -recommended Sinugator \par -Environmental measures for allergies were encouraged including mattress and pillow cover, air purifier, and environmental controls. \par \par problem 6: GERD/LPR\par - Zantac 300 mg QHS \par - Recommended Apple cider vineger tablets. \par -Rule of 2s: avoid eating too much, eating too late, eating too spicy, eating two hours before bed\par -Things to avoid including overeating, spicy foods, tight clothing, eating within three hours of bed, this list is not all inclusive. \par -For treatment of reflux, possible options discussed including diet control, H2 blockers, PPIs, as well as coating motility agents discussed as treatment options. Timing of meals and proximity of last meal to sleep were discussed. If symptoms persist, a formal gastrointestinal evaluation is needed. \par \par problem 7 r/o diaphragm dysfunction\par -fluoroscopy of the diaphragm was all normal\par \par problem 8: low immunity \par -continue to use Gammagard weekly \par -recommended early intervention  \par -s/p Prevnar 13 vaccine \par \par problem 9: abnormal chest CT\par -repeat CT in June 2019, prescription given July 2018\par \par problem 10: health maintenance\par -s/p 2018 flu shot\par -s/p 1st Shingrix vaccine, s/p 2nd shot\par -recommended strep pneumonia vaccines: Prevnar-13 vaccine, followed by Pneumo vaccine 23 on year following\par -recommended early intervention for URIs\par -recommended osteoporosis evaluations\par -recommended early dermatological evaluations\par -recommended after the age of 50 to the age of 70, colonoscopy every 5 years\par \par F/U in 1 month \par She is encouraged to call with any changes, concerns, or questions

## 2019-01-11 NOTE — REASON FOR VISIT
[Follow-Up] : a follow-up visit [FreeTextEntry1] : now acute pharyngitis/sinusitus, s/p URI, abnormal chest CT, asthma, BOOP, cough, eosinophilic asthma, and lung nodule

## 2019-01-11 NOTE — HISTORY OF PRESENT ILLNESS
[FreeTextEntry1] : Ms. Kunz is a 53 year old female presenting to the office for a follow up visit for Nucala injection, asthma, BOOP, cough, eosinophilic asthma, and lung nodules. Her chief complaint is \par -she states her symptoms began 3 days ago\par -she states she has nasal congestion\par -she notes she current coughing\par -she states she has PND with dark yellow green sputum\par -she states she uses apple cider vinegar tablets \par -she states she has discharge coming from her eyes\par -she reports her energy levels are fine\par -she states she currently has MRI planned for her back\par -she notes she has not used her inhaler in 1 week\par -she denies any burning in the chest, heartburn, reflux, or sour taste in the mouth. \par

## 2019-01-11 NOTE — ADDENDUM
[FreeTextEntry1] : Documented by Jazmyn Longoria acting as a scribe for Dr. Munir Malin on 01/11/2019.\par \par All medical record entries made by the Scribe were at my, Dr. Munir Malin's, direction and personally dictated by me on 01/11/2019. I have reviewed the chart and agree that the record accurately reflects my personal performance of the history, physical exam, assessment and plan. I have also personally directed, reviewed, and agree with the discharge instructions.

## 2019-01-11 NOTE — REVIEW OF SYSTEMS
[Negative] : Sleep Disorder [Nasal Congestion] : nasal congestion [Postnasal Drip] : postnasal drip [As Noted in HPI] : as noted in HPI [Cough] : cough [Sputum] : sputum

## 2019-01-14 ENCOUNTER — APPOINTMENT (OUTPATIENT)
Dept: CT IMAGING | Facility: CLINIC | Age: 54
End: 2019-01-14
Payer: COMMERCIAL

## 2019-01-14 ENCOUNTER — OUTPATIENT (OUTPATIENT)
Dept: OUTPATIENT SERVICES | Facility: HOSPITAL | Age: 54
LOS: 1 days | End: 2019-01-14
Payer: COMMERCIAL

## 2019-01-14 DIAGNOSIS — Z98.89 OTHER SPECIFIED POSTPROCEDURAL STATES: Chronic | ICD-10-CM

## 2019-01-14 DIAGNOSIS — M71.30 OTHER BURSAL CYST, UNSPECIFIED SITE: Chronic | ICD-10-CM

## 2019-01-14 DIAGNOSIS — Z00.8 ENCOUNTER FOR OTHER GENERAL EXAMINATION: ICD-10-CM

## 2019-01-14 PROCEDURE — 72131 CT LUMBAR SPINE W/O DYE: CPT

## 2019-01-14 PROCEDURE — 72131 CT LUMBAR SPINE W/O DYE: CPT | Mod: 26

## 2019-01-15 RX ORDER — MEPOLIZUMAB 100 MG/ML
100 INJECTION, POWDER, FOR SOLUTION SUBCUTANEOUS
Qty: 0 | Refills: 0 | Status: COMPLETED | OUTPATIENT
Start: 2019-01-11

## 2019-02-01 ENCOUNTER — APPOINTMENT (OUTPATIENT)
Dept: RADIOLOGY | Facility: HOSPITAL | Age: 54
End: 2019-02-01

## 2019-02-04 ENCOUNTER — APPOINTMENT (OUTPATIENT)
Dept: RADIOLOGY | Facility: HOSPITAL | Age: 54
End: 2019-02-04
Payer: COMMERCIAL

## 2019-02-04 ENCOUNTER — OUTPATIENT (OUTPATIENT)
Dept: OUTPATIENT SERVICES | Facility: HOSPITAL | Age: 54
LOS: 1 days | End: 2019-02-04
Payer: COMMERCIAL

## 2019-02-04 DIAGNOSIS — Z98.89 OTHER SPECIFIED POSTPROCEDURAL STATES: Chronic | ICD-10-CM

## 2019-02-04 DIAGNOSIS — Z00.00 ENCOUNTER FOR GENERAL ADULT MEDICAL EXAMINATION WITHOUT ABNORMAL FINDINGS: ICD-10-CM

## 2019-02-04 DIAGNOSIS — M48.061 SPINAL STENOSIS, LUMBAR REGION WITHOUT NEUROGENIC CLAUDICATION: ICD-10-CM

## 2019-02-04 DIAGNOSIS — M71.30 OTHER BURSAL CYST, UNSPECIFIED SITE: Chronic | ICD-10-CM

## 2019-02-04 PROCEDURE — 62304 MYELOGRAPHY LUMBAR INJECTION: CPT

## 2019-02-04 PROCEDURE — 72131 CT LUMBAR SPINE W/O DYE: CPT

## 2019-02-11 ENCOUNTER — APPOINTMENT (OUTPATIENT)
Dept: PULMONOLOGY | Facility: CLINIC | Age: 54
End: 2019-02-11
Payer: COMMERCIAL

## 2019-02-11 VITALS
HEIGHT: 63 IN | WEIGHT: 168 LBS | RESPIRATION RATE: 17 BRPM | SYSTOLIC BLOOD PRESSURE: 120 MMHG | BODY MASS INDEX: 29.77 KG/M2 | HEART RATE: 108 BPM | OXYGEN SATURATION: 100 % | DIASTOLIC BLOOD PRESSURE: 80 MMHG

## 2019-02-11 DIAGNOSIS — J32.0 CHRONIC MAXILLARY SINUSITIS: ICD-10-CM

## 2019-02-11 DIAGNOSIS — Z87.09 PERSONAL HISTORY OF OTHER DISEASES OF THE RESPIRATORY SYSTEM: ICD-10-CM

## 2019-02-11 DIAGNOSIS — J32.2 CHRONIC ETHMOIDAL SINUSITIS: ICD-10-CM

## 2019-02-11 PROCEDURE — 96401 CHEMO ANTI-NEOPL SQ/IM: CPT

## 2019-02-11 PROCEDURE — 99214 OFFICE O/P EST MOD 30 MIN: CPT | Mod: 25

## 2019-02-11 RX ORDER — MEPOLIZUMAB 100 MG/ML
100 INJECTION, POWDER, FOR SOLUTION SUBCUTANEOUS
Qty: 0 | Refills: 0 | Status: COMPLETED | OUTPATIENT
Start: 2019-02-11

## 2019-02-11 RX ADMIN — MEPOLIZUMAB 1 MG: 100 INJECTION, POWDER, FOR SOLUTION SUBCUTANEOUS at 00:00

## 2019-02-11 NOTE — HISTORY OF PRESENT ILLNESS
[FreeTextEntry1] : Ms. Kunz is a 53 year old female presenting to the office for a follow up visit for Nucala injection, asthma, BOOP, cough, eosinophilic asthma, and lung nodules. She has no complains\par -she states she is feeling well generally\par -she reports she has recovered from her illness last month\par -she notes she has some soreness on her left side\par -she states she has no SOB, even at night or on her back\par -she reports she is sleeping well, sleeping about 6 hours a night\par -she notes she is scheduled for breast reconstruction in about 4-5 months\par -she states her weight is stable \par -she reports her bowels are regular \par -she denies any chest pain, chest pressure, hoarseness diarrhea, constipation, dysphagia, dizziness, sour taste in the mouth, heartburn, reflux,

## 2019-02-11 NOTE — ASSESSMENT
[FreeTextEntry1] : Ms. Kunz is a 53 year old female with a history of chronic sinus disease, GERD, Hashimoto's thyroiditis, BOOP, IgG deficiency, recent iron deficient anemia, coming in for pulmonary reevaluation. She is s/p mastectomy and s/p hospitalization for "expander" Staph infection. She is now recovered and s/p Taxol, Herceptin and Perjetta for her breast cancer. She is now s/p radiation therapy, and presents to the office today for a Nucala injection and follow up visit. 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 \par \par problem `: BOOP\par -diagnosed by right VATs \par -follow up chest CT in 6/2019\par -s/p Zithromax \par \par problem 2: severe persistent asthma\par -continue Breo Ellipta 200 1 inhalation QD \par -continue levalbuterol 0.31 by the nebulizer up to QID\par -followed by Atrovent by the nebulizer up to QID\par -followed by Pulmicort 0.5 by the nebulizer BID \par -Asthma is  believed to be caused by inherited (genetic) and environmental factor, but its exact cause is unknown. Asthma may be triggered by allergens, lung infections, or irritants in the air. Asthma triggers are different for each person\par -Inhaler technique reviewed as well as oral hygiene techniques reviewed with patient. Avoidance of cold air, extremes of temperature, rescue inhaler should be used before exercise. Order of medication reviewed with patient. Recommended use of a cool mist humidifier in the bedroom. \par \par problem 2: eosinophilic  asthma\par -Nucala 100 mg Q monthly, given today in the abdomen \par -The safety and efficacy of Nucala was established in three double-blind, randomized, placebo-controlled trials in patients with severe asthma. Compared to a placebo, patients with severe asthma receiving Nucala had fewer exacerbation requiring hospitalization and/or emergency department visits, and a longer time to first exacerbation.  In addition, patients with severe asthma receiving Nucala experienced greater reductions in their daily maintenance oral corticosteroid dose, while maintaining asthma control compared with patients receiving placebo. Treatment with Nucala did not result in a significant improvement in lung function, as measured by the volume of air exhaled by patients in one second. The most common side effects include: headache, injection site reactions, back pain, weakness, and fatigue; hypersensitivity reactions can occur within hours or days including swelling of the face, mouth, and tongue, fainting, dizziness, hives, breathing problems, and rash; herpes zoster infections have occurred. The drug is a monoclonal antibody that inhibits interleukin -5 which helps regular eosinophils, a type of white blood cell that contributes to asthma. The over-production of eosinophils can cause inflammation in the lungs, increasing the frequency of asthma attacks. Patients must also take other medications, including high dose inhaled corticosteroids and at least one additional asthma drug. \par \par problem 4: allergic rhinitis\par -recommended Xlear or nasal saline, Navage.\par -recommended Sinugator \par -Environmental measures for allergies were encouraged including mattress and pillow cover, air purifier, and environmental controls. \par \par problem 5: GERD/LPR\par - Zantac 300 mg QHS \par - Recommended Apple cider vineger tablets. \par -Rule of 2s: avoid eating too much, eating too late, eating too spicy, eating two hours before bed\par -Things to avoid including overeating, spicy foods, tight clothing, eating within three hours of bed, this list is not all inclusive. \par -For treatment of reflux, possible options discussed including diet control, H2 blockers, PPIs, as well as coating motility agents discussed as treatment options. Timing of meals and proximity of last meal to sleep were discussed. If symptoms persist, a formal gastrointestinal evaluation is needed. \par \par problem 6: r/o diaphragm dysfunction\par -fluoroscopy of the diaphragm was all normal\par \par problem 7: low immunity \par -continue to use Gammagard weekly \par -recommended early intervention  \par -s/p Prevnar 13 vaccine \par \par problem 8: abnormal chest CT\par -repeat CT in June 2019, prescription given July 2018\par \par problem 9: health maintenance\par -s/p 2018 flu shot\par -s/p 1st Shingrix vaccine, s/p 2nd shot\par -recommended strep pneumonia vaccines: Prevnar-13 vaccine, followed by Pneumo vaccine 23 on year following\par -recommended early intervention for URIs\par -recommended osteoporosis evaluations\par -recommended early dermatological evaluations\par -recommended after the age of 50 to the age of 70, colonoscopy every 5 years\par \par F/U in 1 month for Nucala\par She is encouraged to call with any changes, concerns, or questions

## 2019-02-11 NOTE — REASON FOR VISIT
[Follow-Up] : a follow-up visit [FreeTextEntry1] : s/p acute pharyngitis/sinusitis (1/19) , abnormal chest CT, asthma, BOOP, cough, eosinophilic asthma, and lung nodule

## 2019-02-11 NOTE — ADDENDUM
[FreeTextEntry1] : Documented by Nahum Jimenez acting as a scribe for Dr. Munir aMlin on 02/11/2019 \par \par All medical record entries made by the Scribe were at my, Dr. Munir Malin's, direction and personally dictated by me on 02/11/2019  . I have reviewed the chart and agree that the record accurately reflects my personal performance of the history, physical exam, procedure, assessment and plan. I have also personally directed, reviewed, and agree with the discharge instructions. \par \par

## 2019-03-04 ENCOUNTER — RX RENEWAL (OUTPATIENT)
Age: 54
End: 2019-03-04

## 2019-03-06 ENCOUNTER — TRANSCRIPTION ENCOUNTER (OUTPATIENT)
Age: 54
End: 2019-03-06

## 2019-03-11 ENCOUNTER — APPOINTMENT (OUTPATIENT)
Dept: PULMONOLOGY | Facility: CLINIC | Age: 54
End: 2019-03-11
Payer: COMMERCIAL

## 2019-03-11 ENCOUNTER — NON-APPOINTMENT (OUTPATIENT)
Age: 54
End: 2019-03-11

## 2019-03-11 VITALS
HEIGHT: 63 IN | SYSTOLIC BLOOD PRESSURE: 120 MMHG | RESPIRATION RATE: 17 BRPM | BODY MASS INDEX: 29.77 KG/M2 | DIASTOLIC BLOOD PRESSURE: 80 MMHG | WEIGHT: 168 LBS | OXYGEN SATURATION: 100 % | HEART RATE: 95 BPM

## 2019-03-11 DIAGNOSIS — Z87.09 PERSONAL HISTORY OF OTHER DISEASES OF THE RESPIRATORY SYSTEM: ICD-10-CM

## 2019-03-11 PROCEDURE — 94010 BREATHING CAPACITY TEST: CPT

## 2019-03-11 PROCEDURE — 96401 CHEMO ANTI-NEOPL SQ/IM: CPT

## 2019-03-11 PROCEDURE — 99214 OFFICE O/P EST MOD 30 MIN: CPT | Mod: 25

## 2019-03-11 RX ADMIN — MEPOLIZUMAB 1 MG: 100 INJECTION, POWDER, FOR SOLUTION SUBCUTANEOUS at 00:00

## 2019-03-11 NOTE — PROCEDURE
[FreeTextEntry1] : PFT- spi reveals normal flows; FEV1 was 2.13 L which is 80% of predicted; normal flow volume loop. \par \par Today she received her Nucala injection without any difficulty.

## 2019-03-11 NOTE — HISTORY OF PRESENT ILLNESS
[FreeTextEntry1] : Ms. SHERMAN is a 53 year year old female with a history of chronic sinus disease, GERD, Hashimoto's thyroiditis, BOOP, IgG deficiency, recent iron deficient anemia who presents for a follow-up pulmonary evaluation. Her chief complaint is coughing / here for Nucala injection.\par -she has had a little bit of coughing since her last visit but non-productive\par -she is not bringing up anything\par -she denies any tightness in the chest\par -she is using apple cider vinegar tablets with success\par -she denies any palpitations\par -she will be getting an echo in preparation for a procedure\par -she is NOT USING AN INHALER (NONCOMPLIANT)\par -she denies any headaches, nausea, vomiting, fever, chills, sweats, chest pain, chest pressure, diarrhea, constipation, dysphagia, dizziness, leg swelling, leg pain, itchy eyes, itchy ears, heartburn, reflux, or sour taste in the mouth.\par

## 2019-03-11 NOTE — ASSESSMENT
[FreeTextEntry1] : Ms. Kunz is a 53 year old female with a history of chronic sinus disease, GERD, Hashimoto's thyroiditis, BOOP, IgG deficiency, recent iron deficient anemia, coming in for pulmonary reevaluation. She is s/p mastectomy and s/p hospitalization for "expander" Staph infection. She is now recovered and s/p Taxol, Herceptin and Perjetta for her breast cancer. She is now s/p radiation therapy, and presents to the office today for a Nucala injection and follow up visit -- mildly symptomatic.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 \par \par problem `: BOOP\par -diagnosed by right VATs \par -follow up chest CT in 6/2019\par -s/p Zithromax \par \par problem 2: severe persistent asthma (mildly active)\par -restart Breo Ellipta 200 1 inhalation QD \par -continue levalbuterol 0.31 by the nebulizer up to QID\par -followed by Atrovent by the nebulizer up to QID\par -followed by Pulmicort 0.5 by the nebulizer BID \par -Asthma is  believed to be caused by inherited (genetic) and environmental factor, but its exact cause is unknown. Asthma may be triggered by allergens, lung infections, or irritants in the air. Asthma triggers are different for each person\par -Inhaler technique reviewed as well as oral hygiene techniques reviewed with patient. Avoidance of cold air, extremes of temperature, rescue inhaler should be used before exercise. Order of medication reviewed with patient. Recommended use of a cool mist humidifier in the bedroom. \par \par problem 2: eosinophilic  asthma\par -Nucala 100 mg Q monthly, given today in the abdomen \par -The safety and efficacy of Nucala was established in three double-blind, randomized, placebo-controlled trials in patients with severe asthma. Compared to a placebo, patients with severe asthma receiving Nucala had fewer exacerbation requiring hospitalization and/or emergency department visits, and a longer time to first exacerbation.  In addition, patients with severe asthma receiving Nucala experienced greater reductions in their daily maintenance oral corticosteroid dose, while maintaining asthma control compared with patients receiving placebo. Treatment with Nucala did not result in a significant improvement in lung function, as measured by the volume of air exhaled by patients in one second. The most common side effects include: headache, injection site reactions, back pain, weakness, and fatigue; hypersensitivity reactions can occur within hours or days including swelling of the face, mouth, and tongue, fainting, dizziness, hives, breathing problems, and rash; herpes zoster infections have occurred. The drug is a monoclonal antibody that inhibits interleukin -5 which helps regular eosinophils, a type of white blood cell that contributes to asthma. The over-production of eosinophils can cause inflammation in the lungs, increasing the frequency of asthma attacks. Patients must also take other medications, including high dose inhaled corticosteroids and at least one additional asthma drug. \par \par problem 4: allergic rhinitis\par -recommended Xlear or nasal saline, Navage.\par -recommended Sinugator \par -Environmental measures for allergies were encouraged including mattress and pillow cover, air purifier, and environmental controls. \par \par problem 5: GERD/LPR\par - Zantac 300 mg QHS \par - Recommended Apple cider vineger tablets. \par -Rule of 2s: avoid eating too much, eating too late, eating too spicy, eating two hours before bed\par -Things to avoid including overeating, spicy foods, tight clothing, eating within three hours of bed, this list is not all inclusive. \par -For treatment of reflux, possible options discussed including diet control, H2 blockers, PPIs, as well as coating motility agents discussed as treatment options. Timing of meals and proximity of last meal to sleep were discussed. If symptoms persist, a formal gastrointestinal evaluation is needed. \par \par problem 6: r/o diaphragm dysfunction\par -fluoroscopy of the diaphragm was all normal\par \par problem 7: low immunity \par -continue to use Gammagard weekly \par -recommended early intervention  \par -s/p Prevnar 13 vaccine \par \par problem 8: abnormal chest CT\par -repeat CT in June 2019, prescription given July 2018\par \par problem 9: health maintenance\par -s/p 2018 flu shot\par -s/p 1st Shingrix vaccine, s/p 2nd shot\par -recommended strep pneumonia vaccines: Prevnar-13 vaccine, followed by Pneumo vaccine 23 on year following\par -recommended early intervention for URIs\par -recommended osteoporosis evaluations\par -recommended early dermatological evaluations\par -recommended after the age of 50 to the age of 70, colonoscopy every 5 years\par \par F/U in 1 month for Nucala\par She is encouraged to call with any changes, concerns, or questions

## 2019-03-11 NOTE — REASON FOR VISIT
[Follow-Up] : a follow-up visit [FreeTextEntry1] : chronic sinus disease, GERD, Hashimoto's thyroiditis, BOOP, IgG deficiency, recent iron deficient anemia

## 2019-03-12 ENCOUNTER — RX RENEWAL (OUTPATIENT)
Age: 54
End: 2019-03-12

## 2019-03-12 RX ORDER — MEPOLIZUMAB 100 MG/ML
100 INJECTION, POWDER, FOR SOLUTION SUBCUTANEOUS
Qty: 0 | Refills: 0 | Status: COMPLETED | OUTPATIENT
Start: 2019-03-11

## 2019-03-26 ENCOUNTER — APPOINTMENT (OUTPATIENT)
Dept: PULMONOLOGY | Facility: CLINIC | Age: 54
End: 2019-03-26
Payer: COMMERCIAL

## 2019-03-26 VITALS
SYSTOLIC BLOOD PRESSURE: 120 MMHG | DIASTOLIC BLOOD PRESSURE: 80 MMHG | OXYGEN SATURATION: 100 % | HEART RATE: 103 BPM | HEIGHT: 63 IN | BODY MASS INDEX: 29.23 KG/M2 | WEIGHT: 165 LBS | RESPIRATION RATE: 17 BRPM

## 2019-03-26 PROCEDURE — 99214 OFFICE O/P EST MOD 30 MIN: CPT

## 2019-03-26 RX ORDER — AZELASTINE HYDROCHLORIDE 137 UG/1
0.1 SPRAY, METERED NASAL TWICE DAILY
Qty: 1 | Refills: 3 | Status: ACTIVE | COMMUNITY
Start: 2019-03-26 | End: 1900-01-01

## 2019-03-26 NOTE — ASSESSMENT
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 \par \par Problem #1: Acute sinusitis\par -add biaxin 500 mg PO BID x 10 days\par -hydrate\par \par Problem #2: Sinus pressure/congestion and post nasal drip\par -add flonase (pt has ) 2 sprays each nostril am\par -add azelastine nasal spray 2 sprays each nostril am and pm\par \par Problem #3: severe persistent asthma with flare given cough\par -d/c breo- pt does not like feels it is making her voice worse\par -continue prednisone 20 mg x 6 more days (update Monday)\par -continue levalbuterol 0.31 by the nebulizer up to QID\par -followed by Atrovent by the nebulizer up to QID\par -Add Stiolto 2 puffs in am - rinse and gargle after use\par -followed by Pulmicort 0.5 by the nebulizer - increase to TID- rinse and gargle after use\par \par problem #4: Cough\par -add tessalon perles 200 mg PO TID PRN cough\par -add hycodan cough syrup 10ml QHS or can also use during the day if not leaving house- monitor for drowsiness\par \par problem #5: BOOP\par -diagnosed by right VATs \par -follow up chest CT in 6/2019\par \par problem #6: eosinophilic  asthma\par -Nucala 100 mg Q monthly\par \par problem #7: GERD/LPR-stable\par - Zantac 300 mg QHS \par \par problem #7: low immunity \par -continue to use Gammagard weekly \par -recommended early intervention  \par -s/p Prevnar 13 vaccine \par \par problem #8: abnormal chest CT\par -repeat CT in June 2019\par \par F/U as scheduled for Nucala\par She is encouraged to call with any changes, concerns, or questions \par She can update me next week.

## 2019-03-26 NOTE — REVIEW OF SYSTEMS
[Fever] : no fever [Chills] : no chills [Fatigue] : fatigue [Dry Eyes] : no dryness of the eyes [Eye Irritation] : no ~T irritation of the eyes [Nasal Congestion] : nasal congestion [Postnasal Drip] : postnasal drip [Sinus Problems] : sinus problems [Sore Throat] : sore throat [Dry Mouth] : dry mouth [Cough] : cough [Sputum] : sputum  [Dyspnea] : no dyspnea [Chest Tightness] : no chest tightness [Wheezing] : no wheezing [Nasal Discharge] : nasal discharge [As Noted in HPI] : as noted in HPI [Difficulty Initiating Sleep] : difficulty falling asleep [Difficulty Maintaining Sleep] : difficulty maintaining sleep [Negative] : Pulmonary Hypertension [de-identified] : due to cough

## 2019-03-26 NOTE — HISTORY OF PRESENT ILLNESS
[FreeTextEntry1] : Ms. SHERMAN is a 53 year year old female with a history of chronic sinus disease, GERD, Hashimoto's thyroiditis, BOOP, IgG deficiency, recent iron deficient anemia who presents for a sick visit evaluation. Her chief complaint is sinus symptoms and cough.\par \par She states her cough started prior to her last visit here and was told to re-start Breo. She did and saw no difference and called the office. She started prednisone 20 mg under direction of Dr. Malin. She has been using this since Wednesday and actually feels worse. She now no longer has a voice, she has sore throat, she had sinus pressure and pain, terrible post nasal drip and sinus congestion. She is bringing up mucus at times- dark green mucus she states she has mucus coming out of her sinuses as well. She is having trouble sleeping through the night due to cough.  The sinus symptoms and hoarse voice started last week Wednesday night. \par \par She denies fever, chills, appetite issues, SOB, GUIDO, wheezing, chest pain or pressure, HA, rashes, new muscle cramps, heartburn, reflux, or sour taste in the mouth.\par \par She is currently on Breo, she added neb treatments of budesonide, ipratropium bromide and levalbuterol BID as of the last week. \par

## 2019-03-26 NOTE — PHYSICAL EXAM
[General Appearance - Well Developed] : well developed [Normal Appearance] : normal appearance [Well Groomed] : well groomed [General Appearance - Well Nourished] : well nourished [No Deformities] : no deformities [General Appearance - In No Acute Distress] : no acute distress [Normal Conjunctiva] : the conjunctiva exhibited no abnormalities [Eyelids - No Xanthelasma] : the eyelids demonstrated no xanthelasmas [Normal Oropharynx] : normal oropharynx [II] : II [Neck Appearance] : the appearance of the neck was normal [Neck Cervical Mass (___cm)] : no neck mass was observed [Jugular Venous Distention Increased] : there was no jugular-venous distention [Thyroid Diffuse Enlargement] : the thyroid was not enlarged [Thyroid Nodule] : there were no palpable thyroid nodules [Heart Rate And Rhythm] : heart rate and rhythm were normal [Heart Sounds] : normal S1 and S2 [Murmurs] : no murmurs present [Respiration, Rhythm And Depth] : normal respiratory rhythm and effort [Exaggerated Use Of Accessory Muscles For Inspiration] : no accessory muscle use [Auscultation Breath Sounds / Voice Sounds] : lungs were clear to auscultation bilaterally [FreeTextEntry1] : I:E 1:3; clear, tight cough [Abdomen Soft] : soft [Abnormal Walk] : normal gait [Gait - Sufficient For Exercise Testing] : the gait was sufficient for exercise testing [Nail Clubbing] : no clubbing of the fingernails [Cyanosis, Localized] : no localized cyanosis [Petechial Hemorrhages (___cm)] : no petechial hemorrhages [Skin Color & Pigmentation] : normal skin color and pigmentation [] : no rash [No Venous Stasis] : no venous stasis [Skin Lesions] : no skin lesions [No Skin Ulcers] : no skin ulcer [No Xanthoma] : no  xanthoma was observed [Deep Tendon Reflexes (DTR)] : deep tendon reflexes were 2+ and symmetric [Sensation] : the sensory exam was normal to light touch and pinprick [No Focal Deficits] : no focal deficits [Oriented To Time, Place, And Person] : oriented to person, place, and time [Impaired Insight] : insight and judgment were intact [Affect] : the affect was normal [Mood] : the mood was normal

## 2019-03-29 ENCOUNTER — RX RENEWAL (OUTPATIENT)
Age: 54
End: 2019-03-29

## 2019-04-11 ENCOUNTER — APPOINTMENT (OUTPATIENT)
Dept: PULMONOLOGY | Facility: CLINIC | Age: 54
End: 2019-04-11
Payer: COMMERCIAL

## 2019-04-11 VITALS
SYSTOLIC BLOOD PRESSURE: 120 MMHG | HEIGHT: 63 IN | RESPIRATION RATE: 17 BRPM | HEART RATE: 93 BPM | OXYGEN SATURATION: 99 % | BODY MASS INDEX: 29.23 KG/M2 | WEIGHT: 165 LBS | DIASTOLIC BLOOD PRESSURE: 80 MMHG

## 2019-04-11 PROCEDURE — 99214 OFFICE O/P EST MOD 30 MIN: CPT

## 2019-04-11 RX ORDER — CLARITHROMYCIN 500 MG/1
500 TABLET, FILM COATED ORAL
Qty: 20 | Refills: 0 | Status: DISCONTINUED | COMMUNITY
Start: 2019-03-26 | End: 2019-04-11

## 2019-04-11 RX ORDER — AZITHROMYCIN 500 MG/1
500 TABLET, FILM COATED ORAL DAILY
Qty: 7 | Refills: 0 | Status: DISCONTINUED | COMMUNITY
Start: 2019-01-11 | End: 2019-04-11

## 2019-04-11 RX ADMIN — MEPOLIZUMAB 1 MG: 100 INJECTION, POWDER, FOR SOLUTION SUBCUTANEOUS at 00:00

## 2019-04-11 NOTE — PHYSICAL EXAM
[General Appearance - Well Developed] : well developed [Normal Appearance] : normal appearance [Well Groomed] : well groomed [General Appearance - Well Nourished] : well nourished [General Appearance - In No Acute Distress] : no acute distress [No Deformities] : no deformities [Normal Oropharynx] : normal oropharynx [Normal Conjunctiva] : the conjunctiva exhibited no abnormalities [Eyelids - No Xanthelasma] : the eyelids demonstrated no xanthelasmas [Neck Cervical Mass (___cm)] : no neck mass was observed [Neck Appearance] : the appearance of the neck was normal [II] : II [Thyroid Diffuse Enlargement] : the thyroid was not enlarged [Thyroid Nodule] : there were no palpable thyroid nodules [Jugular Venous Distention Increased] : there was no jugular-venous distention [Murmurs] : no murmurs present [Heart Sounds] : normal S1 and S2 [Heart Rate And Rhythm] : heart rate and rhythm were normal [Respiration, Rhythm And Depth] : normal respiratory rhythm and effort [Auscultation Breath Sounds / Voice Sounds] : lungs were clear to auscultation bilaterally [Exaggerated Use Of Accessory Muscles For Inspiration] : no accessory muscle use [Abdomen Tenderness] : non-tender [Abdomen Soft] : soft [Gait - Sufficient For Exercise Testing] : the gait was sufficient for exercise testing [Abdomen Mass (___ Cm)] : no abdominal mass palpated [Abnormal Walk] : normal gait [Petechial Hemorrhages (___cm)] : no petechial hemorrhages [Cyanosis, Localized] : no localized cyanosis [Nail Clubbing] : no clubbing of the fingernails [Skin Color & Pigmentation] : normal skin color and pigmentation [] : no ischemic changes [No Venous Stasis] : no venous stasis [No Skin Ulcers] : no skin ulcer [Skin Lesions] : no skin lesions [No Xanthoma] : no  xanthoma was observed [Sensation] : the sensory exam was normal to light touch and pinprick [Deep Tendon Reflexes (DTR)] : deep tendon reflexes were 2+ and symmetric [Oriented To Time, Place, And Person] : oriented to person, place, and time [No Focal Deficits] : no focal deficits [Impaired Insight] : insight and judgment were intact [Affect] : the affect was normal [FreeTextEntry1] : I:E 1:3; clear.

## 2019-04-11 NOTE — ADDENDUM
[FreeTextEntry1] : Documented by Nahum Jimenez acting as a scribe for Dr. Munir Malin on 04/11/2019 \par \par All medical record entries made by the Scribe were at my, Dr. Munir Malin's, direction and personally dictated by me on 04/11/2019  . I have reviewed the chart and agree that the record accurately reflects my personal performance of the history, physical exam, procedure, assessment and plan. I have also personally directed, reviewed, and agree with the discharge instructions. \par \par

## 2019-04-11 NOTE — ASSESSMENT
[FreeTextEntry1] : Ms. Kunz is a 53 year old female with a history of chronic sinus disease, GERD, Hashimoto's thyroiditis, BOOP, IgG deficiency, recent iron deficient anemia, coming in for pulmonary reevaluation. She is s/p mastectomy and s/p hospitalization for "expander" Staph infection. She is now recovered and s/p Taxol, Herceptin and Perjetta for her breast cancer. She is now s/p radiation therapy, and presents to the office today for a Nucala injection and follow up visit -- mildly symptomatic.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 \par \par problem 1: BOOP\par -diagnosed by right VATs \par -follow up chest CT in 6/2019\par -s/p Zithromax \par \par problem 2: severe persistent asthma (mildly active)\par -discontinue Breo Ellipta \par -add Stiolto 2 inhalation QD\par -add Asmthanex FHA 2 inhalations QD (upto BID)\par -continue levalbuterol 0.31 by the nebulizer up to QID\par -followed by Atrovent by the nebulizer up to QID\par -followed by Pulmicort 0.5 by the nebulizer BID \par -Asthma is  believed to be caused by inherited (genetic) and environmental factor, but its exact cause is unknown. Asthma may be triggered by allergens, lung infections, or irritants in the air. Asthma triggers are different for each person\par -Inhaler technique reviewed as well as oral hygiene techniques reviewed with patient. Avoidance of cold air, extremes of temperature, rescue inhaler should be used before exercise. Order of medication reviewed with patient. Recommended use of a cool mist humidifier in the bedroom. \par \par problem 2: eosinophilic  asthma\par -Nucala 100 mg Q monthly, given today in the abdomen \par -The safety and efficacy of Nucala was established in three double-blind, randomized, placebo-controlled trials in patients with severe asthma. Compared to a placebo, patients with severe asthma receiving Nucala had fewer exacerbation requiring hospitalization and/or emergency department visits, and a longer time to first exacerbation.  In addition, patients with severe asthma receiving Nucala experienced greater reductions in their daily maintenance oral corticosteroid dose, while maintaining asthma control compared with patients receiving placebo. Treatment with Nucala did not result in a significant improvement in lung function, as measured by the volume of air exhaled by patients in one second. The most common side effects include: headache, injection site reactions, back pain, weakness, and fatigue; hypersensitivity reactions can occur within hours or days including swelling of the face, mouth, and tongue, fainting, dizziness, hives, breathing problems, and rash; herpes zoster infections have occurred. The drug is a monoclonal antibody that inhibits interleukin -5 which helps regular eosinophils, a type of white blood cell that contributes to asthma. The over-production of eosinophils can cause inflammation in the lungs, increasing the frequency of asthma attacks. Patients must also take other medications, including high dose inhaled corticosteroids and at least one additional asthma drug. \par \par problem 4: allergic rhinitis\par -recommended Xlear or nasal saline, Navage.\par -recommended Sinugator \par -Environmental measures for allergies were encouraged including mattress and pillow cover, air purifier, and environmental controls. \par \par problem 5: GERD/LPR\par - Zantac 300 mg QHS \par - Recommended Apple cider vineger tablets. \par -Rule of 2s: avoid eating too much, eating too late, eating too spicy, eating two hours before bed\par -Things to avoid including overeating, spicy foods, tight clothing, eating within three hours of bed, this list is not all inclusive. \par -For treatment of reflux, possible options discussed including diet control, H2 blockers, PPIs, as well as coating motility agents discussed as treatment options. Timing of meals and proximity of last meal to sleep were discussed. If symptoms persist, a formal gastrointestinal evaluation is needed. \par \par problem 6: r/o diaphragm dysfunction\par -fluoroscopy of the diaphragm was all normal\par \par problem 7: low immunity \par -continue to use Gammagard weekly \par -recommended early intervention  \par -s/p Prevnar 13 vaccine \par \par problem 8: abnormal chest CT\par -repeat CT in June 2019, prescription given July 2018\par \par problem 9: health maintenance\par -s/p 2018 flu shot\par -s/p 1st Shingrix vaccine, s/p 2nd shot\par -recommended strep pneumonia vaccines: Prevnar-13 vaccine, followed by Pneumo vaccine 23 on year following\par -recommended early intervention for URIs\par -recommended osteoporosis evaluations\par -recommended early dermatological evaluations\par -recommended after the age of 50 to the age of 70, colonoscopy every 5 years\par \par F/U in 1 month for Nucala\par She is encouraged to call with any changes, concerns, or questions

## 2019-04-11 NOTE — HISTORY OF PRESENT ILLNESS
[FreeTextEntry1] : Ms. SHERMAN is a 53 year year old female with a history of chronic sinus disease, GERD, BOOP, IgG deficiency, who presents for a follow-up visit. She presents today for a Nucala injection \par -she reports she is feeling improved with help of nebulizer and nasal sprays\par -she notes she is compliant with using her nebulizer as well as her inhalers\par -she reports using the Breo causes her voice to be hoarse\par -she denies any chest pain, chest pressure, diarrhea, constipation, dysphagia, dizziness, sour taste in the mouth, heartburn, reflux, leg swelling, leg pain, itchy eyes, itchy ears\par

## 2019-04-11 NOTE — REASON FOR VISIT
[Follow-Up] : a follow-up visit [FreeTextEntry1] : chronic sinus disease, GERD,  BOOP, IgG deficiency,

## 2019-04-25 RX ORDER — MEPOLIZUMAB 100 MG/ML
100 INJECTION, POWDER, FOR SOLUTION SUBCUTANEOUS
Qty: 0 | Refills: 0 | Status: COMPLETED | OUTPATIENT
Start: 2019-04-11

## 2019-05-03 ENCOUNTER — APPOINTMENT (OUTPATIENT)
Dept: PULMONOLOGY | Facility: CLINIC | Age: 54
End: 2019-05-03
Payer: COMMERCIAL

## 2019-05-03 VITALS
SYSTOLIC BLOOD PRESSURE: 110 MMHG | HEART RATE: 98 BPM | WEIGHT: 165 LBS | HEIGHT: 63 IN | RESPIRATION RATE: 17 BRPM | BODY MASS INDEX: 29.23 KG/M2 | OXYGEN SATURATION: 100 % | DIASTOLIC BLOOD PRESSURE: 70 MMHG

## 2019-05-03 DIAGNOSIS — Z87.09 PERSONAL HISTORY OF OTHER DISEASES OF THE RESPIRATORY SYSTEM: ICD-10-CM

## 2019-05-03 PROCEDURE — 99214 OFFICE O/P EST MOD 30 MIN: CPT | Mod: 25

## 2019-05-03 PROCEDURE — 71046 X-RAY EXAM CHEST 2 VIEWS: CPT

## 2019-05-03 NOTE — HISTORY OF PRESENT ILLNESS
[FreeTextEntry1] : Ms. SHERMAN is a 53 year year old female with a history of chronic sinus disease, GERD, Hashimoto's thyroiditis, BOOP, IgG deficiency, recent iron deficient anemia, asthma (eosinophilic) who presents for a follow-up pulmonary evaluation. Her chief complaint is cough.\par -sometimes she cannot complete a sentence without coughing\par -sometimes she keeps coughing and it gets exhausting and she gets "twisting" in her stomach\par -her cough is non-productive - it never is, it is just tightness in her chest\par -yesterday she used Albuterol twice\par -her sinuses have not been that bad - not leaky/drippy/congested\par -if her nose gets dry from the nasal sprays she uses Boroleum with success\par -her PND is controlled\par -when she coughs it feels tight in her chest\par -she is using Stiolto and Asmanex\par -she denies any headaches, nausea, vomiting, fever, chills, sweats, chest pain, diarrhea, constipation, dysphagia, dizziness, leg swelling, leg pain, itchy eyes, itchy ears, heartburn, reflux, or sour taste in the mouth.

## 2019-05-03 NOTE — PHYSICAL EXAM
[General Appearance - Well Developed] : well developed [Well Groomed] : well groomed [Normal Appearance] : normal appearance [General Appearance - Well Nourished] : well nourished [No Deformities] : no deformities [General Appearance - In No Acute Distress] : no acute distress [Normal Conjunctiva] : the conjunctiva exhibited no abnormalities [Eyelids - No Xanthelasma] : the eyelids demonstrated no xanthelasmas [Normal Oropharynx] : normal oropharynx [Neck Appearance] : the appearance of the neck was normal [Jugular Venous Distention Increased] : there was no jugular-venous distention [Neck Cervical Mass (___cm)] : no neck mass was observed [Thyroid Nodule] : there were no palpable thyroid nodules [Thyroid Diffuse Enlargement] : the thyroid was not enlarged [Heart Rate And Rhythm] : heart rate and rhythm were normal [Heart Sounds] : normal S1 and S2 [Murmurs] : no murmurs present [Respiration, Rhythm And Depth] : normal respiratory rhythm and effort [Auscultation Breath Sounds / Voice Sounds] : lungs were clear to auscultation bilaterally [Exaggerated Use Of Accessory Muscles For Inspiration] : no accessory muscle use [Abdomen Soft] : soft [Abdomen Tenderness] : non-tender [Abdomen Mass (___ Cm)] : no abdominal mass palpated [Abnormal Walk] : normal gait [Gait - Sufficient For Exercise Testing] : the gait was sufficient for exercise testing [Nail Clubbing] : no clubbing of the fingernails [Cyanosis, Localized] : no localized cyanosis [Petechial Hemorrhages (___cm)] : no petechial hemorrhages [Skin Color & Pigmentation] : normal skin color and pigmentation [No Venous Stasis] : no venous stasis [] : no rash [No Xanthoma] : no  xanthoma was observed [No Skin Ulcers] : no skin ulcer [Skin Lesions] : no skin lesions [Deep Tendon Reflexes (DTR)] : deep tendon reflexes were 2+ and symmetric [Sensation] : the sensory exam was normal to light touch and pinprick [Oriented To Time, Place, And Person] : oriented to person, place, and time [No Focal Deficits] : no focal deficits [Affect] : the affect was normal [Impaired Insight] : insight and judgment were intact [II] : II [FreeTextEntry1] : I:E 1:3; clear.

## 2019-05-03 NOTE — ASSESSMENT
[FreeTextEntry1] : Ms. Kunz is a 53 year old female with a history of chronic sinus disease, GERD, Hashimoto's thyroiditis, BOOP, IgG deficiency, recent iron deficient anemia, asthma (eosinophilic) coming in for pulmonary reevaluation. She is s/p mastectomy and s/p hospitalization for "expander" Staph infection. She is now recovered and s/p Taxol, Herceptin and Perjetta for her breast cancer. She is now s/p radiation therapy, and presents to the office today for acute cough c/w asthma/allergy.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 \par \par problem 1: BOOP\par -diagnosed by right VATs \par -follow up chest CT in 6/2019\par -s/p Zithromax rx past\par \par problem 2: severe persistent asthma (mildly active)\par -add Bevespi 2 puffs\par -add Asmanex 2 puffs BID\par -add Singulair 10mg QHS\par -hold Prednisone\par -continue levalbuterol 0.31 by the nebulizer up to QID\par -followed by Atrovent by the nebulizer up to QID\par -followed by Pulmicort 0.5 by the nebulizer BID \par -Asthma is  believed to be caused by inherited (genetic) and environmental factor, but its exact cause is unknown. Asthma may be triggered by allergens, lung infections, or irritants in the air. Asthma triggers are different for each person\par -Inhaler technique reviewed as well as oral hygiene techniques reviewed with patient. Avoidance of cold air, extremes of temperature, rescue inhaler should be used before exercise. Order of medication reviewed with patient. Recommended use of a cool mist humidifier in the bedroom. \par \par problem 2: eosinophilic  asthma\par -Nucala 100 mg Q monthly\par -The safety and efficacy of Nucala was established in three double-blind, randomized, placebo-controlled trials in patients with severe asthma. Compared to a placebo, patients with severe asthma receiving Nucala had fewer exacerbation requiring hospitalization and/or emergency department visits, and a longer time to first exacerbation.  In addition, patients with severe asthma receiving Nucala experienced greater reductions in their daily maintenance oral corticosteroid dose, while maintaining asthma control compared with patients receiving placebo. Treatment with Nucala did not result in a significant improvement in lung function, as measured by the volume of air exhaled by patients in one second. The most common side effects include: headache, injection site reactions, back pain, weakness, and fatigue; hypersensitivity reactions can occur within hours or days including swelling of the face, mouth, and tongue, fainting, dizziness, hives, breathing problems, and rash; herpes zoster infections have occurred. The drug is a monoclonal antibody that inhibits interleukin -5 which helps regular eosinophils, a type of white blood cell that contributes to asthma. The over-production of eosinophils can cause inflammation in the lungs, increasing the frequency of asthma attacks. Patients must also take other medications, including high dose inhaled corticosteroids and at least one additional asthma drug. \par \par problem 4: allergic rhinitis\par -recommended Xlear or nasal saline, Navage.\par -recommended Sinugator \par -Environmental measures for allergies were encouraged including mattress and pillow cover, air purifier, and environmental controls. \par \par problem 5: GERD/LPR\par - Zantac 300 mg QHS \par - Recommended Apple cider vineger tablets. \par -Rule of 2s: avoid eating too much, eating too late, eating too spicy, eating two hours before bed\par -Things to avoid including overeating, spicy foods, tight clothing, eating within three hours of bed, this list is not all inclusive. \par -For treatment of reflux, possible options discussed including diet control, H2 blockers, PPIs, as well as coating motility agents discussed as treatment options. Timing of meals and proximity of last meal to sleep were discussed. If symptoms persist, a formal gastrointestinal evaluation is needed. \par \par problem 6: r/o diaphragm dysfunction\par -fluoroscopy of the diaphragm was all normal\par \par problem 7: low immunity \par -continue to use Gammagard weekly \par -recommended early intervention  \par -s/p Prevnar 13 vaccine \par \par problem 8: abnormal chest CT\par -repeat CT in June 2019, prescription given July 2018\par \par problem 9: health maintenance\par -s/p 2018 flu shot\par -s/p 1st Shingrix vaccine, s/p 2nd shot\par -recommended strep pneumonia vaccines: Prevnar-13 vaccine, followed by Pneumo vaccine 23 on year following\par -recommended early intervention for URIs\par -recommended osteoporosis evaluations\par -recommended early dermatological evaluations\par -recommended after the age of 50 to the age of 70, colonoscopy every 5 years\par \par F/U in 1 month for Nucala\par She is encouraged to call with any changes, concerns, or questions

## 2019-05-03 NOTE — PROCEDURE
[FreeTextEntry1] : CXR reveals a normal sized heart; no evidence of infiltrate or effusion; magnet on the right.

## 2019-05-03 NOTE — REASON FOR VISIT
[Acute] : an acute visit [FreeTextEntry1] : cough -- history of chronic sinus disease, GERD, BOOP, IgG deficiency, asthma (eosinophilic)

## 2019-05-03 NOTE — ADDENDUM
[FreeTextEntry1] : Documented by Nikos Mancuso acting as a scribe for Dr. Munir Malin on 05/03/2019.\par All medical record entries made by the Scribe were at my, Dr. Munir Malin's, direction and personally dictated by me on 05/03/2019. I have reviewed the chart and agree that the record accurately reflects my personal performance of the history, physical exam, assessment and plan. I have also personally directed, reviewed, and agree with the discharge instructions.

## 2019-05-10 ENCOUNTER — NON-APPOINTMENT (OUTPATIENT)
Age: 54
End: 2019-05-10

## 2019-05-10 ENCOUNTER — APPOINTMENT (OUTPATIENT)
Dept: PULMONOLOGY | Facility: CLINIC | Age: 54
End: 2019-05-10
Payer: COMMERCIAL

## 2019-05-10 VITALS
HEART RATE: 109 BPM | BODY MASS INDEX: 29.23 KG/M2 | DIASTOLIC BLOOD PRESSURE: 70 MMHG | RESPIRATION RATE: 17 BRPM | HEIGHT: 63 IN | SYSTOLIC BLOOD PRESSURE: 110 MMHG | OXYGEN SATURATION: 99 % | WEIGHT: 165 LBS

## 2019-05-10 PROCEDURE — 95012 NITRIC OXIDE EXP GAS DETER: CPT

## 2019-05-10 PROCEDURE — 99214 OFFICE O/P EST MOD 30 MIN: CPT | Mod: 25

## 2019-05-10 PROCEDURE — 94010 BREATHING CAPACITY TEST: CPT

## 2019-05-10 RX ORDER — ALBUTEROL SULFATE 90 UG/1
108 (90 BASE) AEROSOL, METERED RESPIRATORY (INHALATION)
Qty: 3 | Refills: 1 | Status: ACTIVE | COMMUNITY
Start: 2019-05-10 | End: 1900-01-01

## 2019-05-10 RX ORDER — LETROZOLE TABLETS 2.5 MG/1
2.5 TABLET, FILM COATED ORAL
Qty: 30 | Refills: 0 | Status: ACTIVE | COMMUNITY
Start: 2019-01-11

## 2019-05-10 RX ORDER — MELOXICAM 7.5 MG/1
7.5 TABLET ORAL
Qty: 30 | Refills: 0 | Status: ACTIVE | COMMUNITY
Start: 2019-04-17

## 2019-05-10 RX ADMIN — MEPOLIZUMAB 1 MG: 100 INJECTION, POWDER, FOR SOLUTION SUBCUTANEOUS at 00:00

## 2019-05-10 NOTE — HISTORY OF PRESENT ILLNESS
[FreeTextEntry1] : Ms. SHERMAN is a 53 year year old female with a history of chronic sinus disease, GERD, BOOP, eosinophilic asthma who presents for a follow-up visit. Her chief complaint is cough \par -she reports she still has an intermittent non productive cough\par -she notes she is still using her Asmanex and Bevespi, she is seeing benefits and is tolerating well \par -she states she is still unable to finish her sentences without coughing\par -she reports she has been using her nebulizer twice a day\par -she notes her heartburn is improved with Zantac \par -she states her appetite is good\par -she reports she is having breast surgery in June with Dr. Kevin Win at [facility] [place]\par -she notes she recently had an echocardiogram with an EF of 59%\par -she denies any headaches, nausea, vomiting, fever, chills, sweats, chest pain, chest pressure, diarrhea, constipation, dysphagia, dizziness, sour taste in the mouth, heartburn, reflux, itchy eyes, itchy ears, leg swelling, leg pain, myalgias or arthralgias\par

## 2019-05-10 NOTE — REASON FOR VISIT
[Follow-Up] : a follow-up visit [FreeTextEntry1] : pre-op for breast surgery: chronic sinus disease, GERD, BOOP, asthma (eosinophilic)

## 2019-05-10 NOTE — PROCEDURE
[FreeTextEntry1] : PFT revealed normal flows, with a FEV1 of 2.18 L, which is 82% of predicted, with a normal flow volume loop\par  \par FENO was 15 ; a normal value being less than 25\par Fractional exhaled nitric oxide (FENO) is regarded as a simple, noninvasive method for assessing eosinophilic airway inflammation. Produced by a variety of cells within the lung, nitric oxide (NO) concentrations are generally low in healthy individuals. However, high concentrations of NO appear to be involved in nonspecific host defense mechanisms and chronic inflammatory diseases such as asthma. The American Thoracic Society (ATS) therefore has recommended using FENO to aid in the diagnosis and monitoring of eosinophilic airway inflammation and asthma, and for identifying steroid responsive individuals whose chronic respiratory symptoms may be caused by airway inflammation.

## 2019-05-10 NOTE — ASSESSMENT
[FreeTextEntry1] : Ms. Kunz is a 53 year old female with a history of chronic sinus disease, GERD, Hashimoto's thyroiditis, BOOP, IgG deficiency, recent iron deficient anemia, asthma (eosinophilic) coming in for pulmonary reevaluation. She is s/p mastectomy and s/p hospitalization for "expander" Staph infection. She is now recovered and s/p Taxol, Herceptin and Perjetta for her breast cancer. She is now s/p radiation therapy, and presents to the office today with improved cough c/w asthma/allergies and is here for pre-op clearance for breast surgery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 \par \par problem : Pre[place] [facility] [place] -at this point in time there are no absolute pulmonary contraindications to go forward with the planned procedure \par -at the time of surgery s/he should have optimal pain control, incentive spirometry, early ambulation, DVT and GI prophylaxis.\par \par problem 1: BOOP (controlled)\par -diagnosed by right VATs \par -follow up chest CT in 6/2019\par -s/p Zithromax rx past\par \par problem 2: severe persistent asthma (improved)\par -continue Bevespi 2 puffs\par -continue Asmanex 2 puffs BID\par -continue Singulair 10mg QHS\par -continue levalbuterol 0.31 by the nebulizer up to QID\par -followed by Atrovent by the nebulizer up to QID\par -followed by Pulmicort 0.5 by the nebulizer BID \par -Asthma is  believed to be caused by inherited (genetic) and environmental factor, but its exact cause is unknown. Asthma may be triggered by allergens, lung infections, or irritants in the air. Asthma triggers are different for each person\par -Inhaler technique reviewed as well as oral hygiene techniques reviewed with patient. Avoidance of cold air, extremes of temperature, rescue inhaler should be used before exercise. Order of medication reviewed with patient. Recommended use of a cool mist humidifier in the bedroom. \par \par problem 2: eosinophilic  asthma\par -Nucala 100 mg Q monthly (given today)\par -The safety and efficacy of Nucala was established in three double-blind, randomized, placebo-controlled trials in patients with severe asthma. Compared to a placebo, patients with severe asthma receiving Nucala had fewer exacerbation requiring hospitalization and/or emergency department visits, and a longer time to first exacerbation.  In addition, patients with severe asthma receiving Nucala experienced greater reductions in their daily maintenance oral corticosteroid dose, while maintaining asthma control compared with patients receiving placebo. Treatment with Nucala did not result in a significant improvement in lung function, as measured by the volume of air exhaled by patients in one second. The most common side effects include: headache, injection site reactions, back pain, weakness, and fatigue; hypersensitivity reactions can occur within hours or days including swelling of the face, mouth, and tongue, fainting, dizziness, hives, breathing problems, and rash; herpes zoster infections have occurred. The drug is a monoclonal antibody that inhibits interleukin -5 which helps regular eosinophils, a type of white blood cell that contributes to asthma. The over-production of eosinophils can cause inflammation in the lungs, increasing the frequency of asthma attacks. Patients must also take other medications, including high dose inhaled corticosteroids and at least one additional asthma drug. \par \par problem 4: allergic rhinitis\par -recommended Xlear or nasal saline, Navage.\par -recommended Sinugator \par -Environmental measures for allergies were encouraged including mattress and pillow cover, air purifier, and environmental controls. \par \par problem 5: GERD/LPR\par - Zantac 300 mg QHS \par - Recommended Apple cider vineger tablets. \par -Rule of 2s: avoid eating too much, eating too late, eating too spicy, eating two hours before bed\par -Things to avoid including overeating, spicy foods, tight clothing, eating within three hours of bed, this list is not all inclusive. \par -For treatment of reflux, possible options discussed including diet control, H2 blockers, PPIs, as well as coating motility agents discussed as treatment options. Timing of meals and proximity of last meal to sleep were discussed. If symptoms persist, a formal gastrointestinal evaluation is needed. \par \par problem 6: r/o diaphragm dysfunction\par -fluoroscopy of the diaphragm was all normal\par \par problem 7: low immunity \par -continue to use Gammagard weekly \par -recommended early intervention  \par -s/p Prevnar 13 vaccine \par \par problem 8: abnormal chest CT\par -repeat CT in June 2019, prescription given July 2018\par \par problem 9: health maintenance\par -s/p 2018 flu shot\par -s/p 1st Shingrix vaccine, s/p 2nd shot\par -recommended strep pneumonia vaccines: Prevnar-13 vaccine, followed by Pneumo vaccine 23 on year following\par -recommended early intervention for URIs\par -recommended osteoporosis evaluations\par -recommended early dermatological evaluations\par -recommended after the age of 50 to the age of 70, colonoscopy every 5 years\par \par F/U in 1 month for Nucala\par She is encouraged to call with any changes, concerns, or questions

## 2019-05-10 NOTE — ADDENDUM
[FreeTextEntry1] : Documented by Nahum Jimenez acting as a scribe for Dr. Munir Malin on 05/10/2019 \par \par All medical record entries made by the Scribe were at my, Dr. Munir Malin's, direction and personally dictated by me on 05/10/2019  . I have reviewed the chart and agree that the record accurately reflects my personal performance of the history, physical exam, procedure, assessment and plan. I have also personally directed, reviewed, and agree with the discharge instructions. \par \par

## 2019-05-10 NOTE — PHYSICAL EXAM
[General Appearance - Well Developed] : well developed [Normal Appearance] : normal appearance [Well Groomed] : well groomed [No Deformities] : no deformities [General Appearance - Well Nourished] : well nourished [General Appearance - In No Acute Distress] : no acute distress [Normal Conjunctiva] : the conjunctiva exhibited no abnormalities [Eyelids - No Xanthelasma] : the eyelids demonstrated no xanthelasmas [Normal Oropharynx] : normal oropharynx [II] : II [Neck Appearance] : the appearance of the neck was normal [Neck Cervical Mass (___cm)] : no neck mass was observed [Jugular Venous Distention Increased] : there was no jugular-venous distention [Thyroid Diffuse Enlargement] : the thyroid was not enlarged [Thyroid Nodule] : there were no palpable thyroid nodules [Heart Rate And Rhythm] : heart rate and rhythm were normal [Heart Sounds] : normal S1 and S2 [Murmurs] : no murmurs present [Exaggerated Use Of Accessory Muscles For Inspiration] : no accessory muscle use [Respiration, Rhythm And Depth] : normal respiratory rhythm and effort [Auscultation Breath Sounds / Voice Sounds] : lungs were clear to auscultation bilaterally [Abdomen Soft] : soft [Abdomen Tenderness] : non-tender [Abdomen Mass (___ Cm)] : no abdominal mass palpated [Gait - Sufficient For Exercise Testing] : the gait was sufficient for exercise testing [Abnormal Walk] : normal gait [Nail Clubbing] : no clubbing of the fingernails [Petechial Hemorrhages (___cm)] : no petechial hemorrhages [Cyanosis, Localized] : no localized cyanosis [Skin Color & Pigmentation] : normal skin color and pigmentation [] : no rash [No Venous Stasis] : no venous stasis [Skin Lesions] : no skin lesions [No Skin Ulcers] : no skin ulcer [No Xanthoma] : no  xanthoma was observed [Deep Tendon Reflexes (DTR)] : deep tendon reflexes were 2+ and symmetric [No Focal Deficits] : no focal deficits [Sensation] : the sensory exam was normal to light touch and pinprick [Oriented To Time, Place, And Person] : oriented to person, place, and time [Impaired Insight] : insight and judgment were intact [Affect] : the affect was normal [FreeTextEntry1] : I:E 1:3; clear.

## 2019-05-16 RX ORDER — MEPOLIZUMAB 100 MG/ML
100 INJECTION, POWDER, FOR SOLUTION SUBCUTANEOUS
Qty: 0 | Refills: 0 | Status: COMPLETED | OUTPATIENT
Start: 2019-05-10

## 2019-05-22 LAB
MEV IGG FLD QL IA: >300 AU/ML
MEV IGG+IGM SER-IMP: POSITIVE
MEV IGM SER QL: NEGATIVE
RUBV IGG FLD-ACNC: 8 INDEX
RUBV IGG SER-IMP: POSITIVE
RUBV IGM FLD-ACNC: <20 AU/ML

## 2019-05-23 ENCOUNTER — TRANSCRIPTION ENCOUNTER (OUTPATIENT)
Age: 54
End: 2019-05-23

## 2019-06-13 ENCOUNTER — APPOINTMENT (OUTPATIENT)
Dept: PULMONOLOGY | Facility: CLINIC | Age: 54
End: 2019-06-13
Payer: COMMERCIAL

## 2019-06-13 ENCOUNTER — APPOINTMENT (OUTPATIENT)
Dept: PULMONOLOGY | Facility: CLINIC | Age: 54
End: 2019-06-13

## 2019-06-13 VITALS
DIASTOLIC BLOOD PRESSURE: 70 MMHG | WEIGHT: 171 LBS | HEIGHT: 63 IN | RESPIRATION RATE: 17 BRPM | OXYGEN SATURATION: 96 % | SYSTOLIC BLOOD PRESSURE: 112 MMHG | BODY MASS INDEX: 30.3 KG/M2 | HEART RATE: 100 BPM

## 2019-06-13 VITALS — TEMPERATURE: 98.3 F

## 2019-06-13 DIAGNOSIS — G89.18 UNSPECIFIED ABDOMINAL PAIN: ICD-10-CM

## 2019-06-13 DIAGNOSIS — J98.6 DISORDERS OF DIAPHRAGM: ICD-10-CM

## 2019-06-13 DIAGNOSIS — R10.9 UNSPECIFIED ABDOMINAL PAIN: ICD-10-CM

## 2019-06-13 PROCEDURE — 99214 OFFICE O/P EST MOD 30 MIN: CPT | Mod: 25

## 2019-06-13 PROCEDURE — 96401 CHEMO ANTI-NEOPL SQ/IM: CPT

## 2019-06-13 RX ORDER — MEPOLIZUMAB 100 MG/ML
100 INJECTION, POWDER, FOR SOLUTION SUBCUTANEOUS
Qty: 0 | Refills: 0 | Status: COMPLETED | OUTPATIENT
Start: 2019-06-13

## 2019-06-13 RX ADMIN — MEPOLIZUMAB 1 MG: 100 INJECTION, POWDER, FOR SOLUTION SUBCUTANEOUS at 00:00

## 2019-06-13 NOTE — PHYSICAL EXAM
[Normal Appearance] : normal appearance [General Appearance - Well Developed] : well developed [General Appearance - Well Nourished] : well nourished [No Deformities] : no deformities [Eyelids - No Xanthelasma] : the eyelids demonstrated no xanthelasmas [Normal Conjunctiva] : the conjunctiva exhibited no abnormalities [General Appearance - In No Acute Distress] : no acute distress [Normal Oropharynx] : normal oropharynx [II] : II [Neck Appearance] : the appearance of the neck was normal [Heart Rate And Rhythm] : heart rate and rhythm were normal [Heart Sounds] : normal S1 and S2 [Murmurs] : no murmurs present [Exaggerated Use Of Accessory Muscles For Inspiration] : no accessory muscle use [Respiration, Rhythm And Depth] : normal respiratory rhythm and effort [Abdomen Soft] : soft [Abnormal Walk] : normal gait [Cyanosis, Localized] : no localized cyanosis [Nail Clubbing] : no clubbing of the fingernails [Gait - Sufficient For Exercise Testing] : the gait was sufficient for exercise testing [Petechial Hemorrhages (___cm)] : no petechial hemorrhages [Skin Color & Pigmentation] : normal skin color and pigmentation [] : no rash [Deep Tendon Reflexes (DTR)] : deep tendon reflexes were 2+ and symmetric [No Focal Deficits] : no focal deficits [Oriented To Time, Place, And Person] : oriented to person, place, and time [Impaired Insight] : insight and judgment were intact [Affect] : the affect was normal [Arterial Pulses Normal] : the arterial pulses were normal [FreeTextEntry1] : s/p breast surgery [Suprapubic] : in the suprapubic area [Mood] : the mood was normal

## 2019-06-13 NOTE — REVIEW OF SYSTEMS
[Cough] : cough [Fever] : no fever [Chills] : no chills [Fatigue] : fatigue [Sputum] : not coughing up ~M sputum [Poor Appetite] : normal appetite  [Chest Tightness] : no chest tightness [Dyspnea] : dyspnea [Wheezing] : wheezing [Nasal Discharge] : nasal discharge [Difficulty Initiating Sleep] : difficulty falling asleep [As Noted in HPI] : as noted in HPI [Negative] : HEENT [Difficulty Maintaining Sleep] : difficulty maintaining sleep [de-identified] : pt unable to sleep at hospital due to tests and cough, sleep is a little better now but cough is waking her up at night [FreeTextEntry7] : Reports her heartburn is controlled with zantac

## 2019-06-13 NOTE — ASSESSMENT
[FreeTextEntry1] : Ms. 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 in detail\par \par Problem 2: Cough/chest congestion\par -steroids as above will help\par -d/c hydrocodone homatropine tabs\par -add hydromet cough syrup 1 tsk Q 4-6 hours PRN cough- monitor for drowsiness\par -perles 200 mg PO TID PRN cough (if needed)\par -pt advised to hydrate; she is drinking mostly teas\par -can trial some mucinex if well hydrated to break up some congestion\par -pt was prescribed gabapentin by [facility] for nerve pain, she has not used it yet at home, I advised her that it may help calm the coughing as well. \par \par problem 3: BOOP (controlled)\par -diagnosed by right VATs \par -follow up chest CT in 6/2019- had one at [facility]; awaiting the results of scan via fax\par -s/p Zithromax rx past\par \par problem 4: eosinophilic  asthma\par -Nucala 100 mg Q monthly (given today by RN)\par \par problem 5: allergic rhinitis- reports stable aside from increased nasal discharge\par -nasal saline if needed\par -monitor\par \par problem 6: GERD/LPR\par -continue Zantac 300 mg QHS \par -monitor if worsens\par \par problem 7: low immunity \par -continue to use Gammagard weekly \par -recommended early intervention  \par -s/p Prevnar 13 vaccine \par \par problem 8: abnormal chest CT\par -repeat CT in June 2019 completed with [facility]- awaiting results\par \par problem 9: abdominal pain, poor inspiratory effort\par -advised pt to use her pain meds to control her abdominal pain; pulmonary toileting including deep breathing is crucial to avoid atelectasis and pneumonia\par -splint cough\par \par call office with update in 1 week to discuss progress. \par F/U 2 weeks with Dr. Malin\par F/U in 1 month for Nucala\par She is encouraged to call with any changes, concerns, or questions

## 2019-06-13 NOTE — HISTORY OF PRESENT ILLNESS
[FreeTextEntry1] : Ms. SHERMAN is a 54 year year old female with a history of chronic sinus disease, GERD, BOOP, eosinophilic asthma who presents for an acute visit. Her chief complaint is cough and wheezing. \par \par Pt was in for her routine nucala injection but is not feeling well. \par She had breast reconstruction surgery on June 6th and post operatively she developed chest congestion, mucus, cough, SOB and associated lower abdominal pain with cough due to lower abdominal incision. \par The shortness of breath worsened and the patient was sent for CXR and CT scan. She was told there was no PE. She was placed on Robitussin with codeine, tessalon perles at hospital along with pain meds- oxycodone, valium and gabapentin. \par \par Since discharge she has not felt much relief. The cough tablets are not working for her.  She continues to wheeze, have coughing fits, and reports she is not able to sleep due to the cough. The patient reports she does better with cough syrup. She reports the cough is dry and non stop. The pain from the abdomen persists due to cough.\par \par She is using bevespi and asmanex and using her neb meds 1x a day.  She was unclear on how her meds should be used.\par \par She received her nucala injection today with the RN. \par She is questioning it's effectiveness and will discuss continuation of it at her follow up with Dr. Malin. \par She denies feeling "sick."

## 2019-06-27 ENCOUNTER — APPOINTMENT (OUTPATIENT)
Dept: PULMONOLOGY | Facility: CLINIC | Age: 54
End: 2019-06-27

## 2019-06-30 ENCOUNTER — FORM ENCOUNTER (OUTPATIENT)
Age: 54
End: 2019-06-30

## 2019-07-01 ENCOUNTER — APPOINTMENT (OUTPATIENT)
Dept: CT IMAGING | Facility: CLINIC | Age: 54
End: 2019-07-01
Payer: COMMERCIAL

## 2019-07-01 ENCOUNTER — OUTPATIENT (OUTPATIENT)
Dept: OUTPATIENT SERVICES | Facility: HOSPITAL | Age: 54
LOS: 1 days | End: 2019-07-01
Payer: COMMERCIAL

## 2019-07-01 DIAGNOSIS — Z98.89 OTHER SPECIFIED POSTPROCEDURAL STATES: Chronic | ICD-10-CM

## 2019-07-01 DIAGNOSIS — R93.89 ABNORMAL FINDINGS ON DIAGNOSTIC IMAGING OF OTHER SPECIFIED BODY STRUCTURES: ICD-10-CM

## 2019-07-01 DIAGNOSIS — M71.30 OTHER BURSAL CYST, UNSPECIFIED SITE: Chronic | ICD-10-CM

## 2019-07-01 PROCEDURE — 71250 CT THORAX DX C-: CPT | Mod: 26

## 2019-07-01 PROCEDURE — 71250 CT THORAX DX C-: CPT

## 2019-07-03 ENCOUNTER — APPOINTMENT (OUTPATIENT)
Dept: PULMONOLOGY | Facility: CLINIC | Age: 54
End: 2019-07-03
Payer: COMMERCIAL

## 2019-07-03 VITALS
HEART RATE: 91 BPM | WEIGHT: 165 LBS | OXYGEN SATURATION: 98 % | DIASTOLIC BLOOD PRESSURE: 68 MMHG | HEIGHT: 63 IN | BODY MASS INDEX: 29.23 KG/M2 | RESPIRATION RATE: 17 BRPM | SYSTOLIC BLOOD PRESSURE: 110 MMHG

## 2019-07-03 PROCEDURE — 99215 OFFICE O/P EST HI 40 MIN: CPT

## 2019-07-03 NOTE — REVIEW OF SYSTEMS
[As Noted in HPI] : as noted in HPI [Cough] : cough [Chest Tightness] : chest tightness [Negative] : Sleep Disorder [Fever] : no fever [Chills] : no chills [Fatigue] : no fatigue [Poor Appetite] : normal appetite  [Sputum] : not coughing up ~M sputum [Dyspnea] : no dyspnea [Wheezing] : no wheezing [Nasal Discharge] : no nasal discharge [FreeTextEntry7] : Reports her heartburn is controlled with zantac

## 2019-07-03 NOTE — PROCEDURE
[FreeTextEntry1] : EXAM: CT CHEST \par \par \par PROCEDURE DATE: 07/01/2019 \par \par \par \par INTERPRETATION: Reason for Exam: Nodule follow-up \par \par CT of the chest was performed from the thoracic inlet to the level of the \par adrenal glands without contrast injection. \par \par Comparison: July 7, 2018 and January 25, 2018 \par \par Tubes/Lines: None. \par \par Mediastinum/Vessels/Heart: Aorta and pulmonary arteries are normal in size. \par There is no pericardial effusion. No lymphadenopathy. Thyroid gland is \par unremarkable \par \par Lungs/Pleura/Airways: Multiple new nodules are identified the largest of \par which is seen in the left lung base measuring 1.4 cm. Scattered other \par nodules measure between 0.2 and 0.5 cm and are also new since the previous \par examination. For example series 3 image 96 and the right lower lobe and \par series 3 image 100 and the left lower lobe. \par \par Minimal bilateral reticular opacities are noted. \par \par Visualized abdomen: Unremarkable noncontrast appearance of the upper abdomen \par \par Bones and soft tissues: No suspicious osseous lesions. Degenerative changes \par noted throughout the spine. Post bilateral breast reconstruction. \par \par IMPRESSION: \par \par New bilateral pulmonary nodules as described above. These are indeterminate. \par The differential includes benign causes such as infection as well as \par malignancy. Consider close interval follow-up in 1-3 months for further \par evaluation. \par \par Discussed with Fabienne on behalf of Dr. Malin at time of final signing. \par \par \par \par GABRIELE HAMILTON M.D., ATTENDING RADIOLOGIST \par This document has been electronically signed. Jul 1 2019 1:39PM

## 2019-07-03 NOTE — HISTORY OF PRESENT ILLNESS
[FreeTextEntry1] : Ms. SHERMAN is a 54 year year old female with a history of chronic sinus disease, GERD, BOOP, eosinophilic asthma who presents for an acute visit. Her chief complaint is cough and wheezing. \par \par Pt was in for her routine nucala injection but is not feeling well. \par She had breast reconstruction surgery on June 6th and post operatively she developed chest congestion, mucus, cough, SOB and associated lower abdominal pain with cough due to lower abdominal incision. \par The shortness of breath worsened and the patient was sent for CXR and CT scan. She was told there was no PE. She was placed on Robitussin with codeine, tessalon perles at hospital along with pain meds- oxycodone, valium and gabapentin. \par \par Since discharge she has not felt much relief. The cough tablets are not working for her.  She continues to wheeze, have coughing fits, and reports she is not able to sleep due to the cough. The patient reports she does better with cough syrup. She reports the cough is dry and non stop. The pain from the abdomen persists due to cough.\par \par She is using bevespi and asmanex and using her neb meds 1x a day.  She was unclear on how her meds should be used.\par \par She received her nucala injection today with the RN. \par She is questioning it's effectiveness and will discuss continuation of it at her follow up with Dr. Malin. \par She denies feeling "sick." \par \par UPDATE 7/3/19:\par \par Pt is in for follow up of the above. \par Pt reports feeling a great amount of improvement over all however continues to have cough after speaking a few sentences. She admits she has some days that are better than others but still is coughing. She finished prednisone last week.\par \par She is no longer coughing at night and is able to sleep. She is no longer short of breath and her cough is no longer productive.  She is off mucinex. She was not able to nebulize this morning but just used her inhalers and she reports she feels like she needs a treatment from nebulizer. She is using the cough syrup minimally for bad spurts of cough but has not needed to use it for consistent relief. She feels the tessalon perles did not do much for her. \par \par She reports yesterday morning she felt tightness/tenderness in between her shoulder blades as well as midsternal. It started upon awakening and took until the afternoon to go away completely- there was gradual easing of the sensation. \par \par She had a CT scan on 7/1/19. \par She feels the nucala is no longer effective for her.  She had a cbc recently at her allergists office- she will get us the records. \par \par She reports healing well from her recent surgery.

## 2019-07-03 NOTE — ASSESSMENT
[FreeTextEntry1] : The plan for the patient is as follows:\par \par Problem 1: Abnormal CT chest\par -recent ct scan not compared to the one done on 6/6/19 at [facility]\par -6/6/19 CT from [facility] notes 2 new LL nodules largest measuring .6cm as well as increase in RUL nodule from .2 to .3cm\par -obtain [facility]'s images and submit them to  imaging for comparison; PHI release form to be filled out/task to LC in office to get images and submit. \par -we will let the patient know when results are available\par -given the most recent CT scans finding of new LLL nodule of 1.4cm and recent symptoms- tx for infection with avelox 400 mg x 8 days.  \par -will need follow up CT scan in 4-6 weeks\par \par Problem 2: Severe persistent asthma with recent flare- improving\par -continue levalbuterol .63 by the nebulizer at least BID to QID PRN\par -followed by Atrovent by the nebulizer at least BID to QID PRN\par (advised her as she starts to feel better to drop the frequency of use, d/c atrovent first)\par -Continue Pulmicort 0.5 by the nebulizer BID Rinse and gargle after use- needs to use until stable without albuterol and atrovent; then d/c after 1 week of using it 1 x a day\par -continue Bevespi 2 puffs am and pm rinse and gargle\par -continue Asmanex 2 puffs am and pm rinse and gargle\par -continue Singulair 10mg QHS\par -discussed the need for inhalers/nebs etc. in detail; wrote out plan for her on paper\par \par Problem 2: Cough\par -hydromet cough syrup 1 tsk Q 4-6 hours PRN cough- monitor for drowsiness- use sparingly\par -perles 200 mg PO TID PRN cough (if needed)\par -pt advised to hydrate\par \par problem 3: hx of BOOP (controlled)\par -diagnosed by right VATs \par \par problem 4: eosinophilic asthma\par -Nucala 100 mg Q monthly- due next week\par -assess for candidacy for fasenra; await cbc results and if not available, new cbc rx given to patient to complete\par -Fasenra distinctively targets and rapidly depletes eosinophils and is the first respiratory biologic with an 8-week maintenance dosing schedule. This is add-on maintenance treatment of patients with severe asthma aged 12 years and older, and with an eosinophilic phenotype. \par \par problem 5: allergic rhinitis- reports stable aside from increased nasal discharge\par -nasal saline if needed\par -monitor\par \par problem 6: GERD/LPR-stable\par -continue Zantac 300 mg QHS- discussed PM dosing benefit with patient\par -monitor if worsens\par \par problem 7: low immunity \par -continue to use Gammagard weekly \par -recommended early intervention  \par \par \par call office with update in 2 weeks to discuss progress. \par F/U in 1 month with Dr. Malin\par F/U in 1 week for Nucala\par She is encouraged to call with any changes, concerns, or questions

## 2019-07-03 NOTE — PHYSICAL EXAM
[General Appearance - Well Developed] : well developed [Normal Appearance] : normal appearance [General Appearance - Well Nourished] : well nourished [No Deformities] : no deformities [General Appearance - In No Acute Distress] : no acute distress [Normal Conjunctiva] : the conjunctiva exhibited no abnormalities [Eyelids - No Xanthelasma] : the eyelids demonstrated no xanthelasmas [Normal Oropharynx] : normal oropharynx [II] : II [Neck Appearance] : the appearance of the neck was normal [Heart Rate And Rhythm] : heart rate and rhythm were normal [Heart Sounds] : normal S1 and S2 [Murmurs] : no murmurs present [Arterial Pulses Normal] : the arterial pulses were normal [Respiration, Rhythm And Depth] : normal respiratory rhythm and effort [Exaggerated Use Of Accessory Muscles For Inspiration] : no accessory muscle use [Abdomen Soft] : soft [Gait - Sufficient For Exercise Testing] : the gait was sufficient for exercise testing [Abnormal Walk] : normal gait [Nail Clubbing] : no clubbing of the fingernails [Cyanosis, Localized] : no localized cyanosis [Petechial Hemorrhages (___cm)] : no petechial hemorrhages [Skin Color & Pigmentation] : normal skin color and pigmentation [] : no rash [Deep Tendon Reflexes (DTR)] : deep tendon reflexes were 2+ and symmetric [No Focal Deficits] : no focal deficits [Oriented To Time, Place, And Person] : oriented to person, place, and time [Impaired Insight] : insight and judgment were intact [Affect] : the affect was normal [Mood] : the mood was normal [FreeTextEntry1] : s/p abdominal surgery

## 2019-07-10 ENCOUNTER — APPOINTMENT (OUTPATIENT)
Dept: PULMONOLOGY | Facility: CLINIC | Age: 54
End: 2019-07-10

## 2019-07-26 NOTE — ADDENDUM
[FreeTextEntry1] : Documented by Nikos Mancuso acting as a scribe for Dr. Munir Malin on 03/11/2019.\par All medical record entries made by the Scribe were at my, Dr. Munir Malin's, direction and personally dictated by me on 03/11/2019. I have reviewed the chart and agree that the record accurately reflects my personal performance of the history, physical exam, assessment and plan. I have also personally directed, reviewed, and agree with the discharge instructions.\par  declines

## 2019-08-11 ENCOUNTER — FORM ENCOUNTER (OUTPATIENT)
Age: 54
End: 2019-08-11

## 2019-08-12 ENCOUNTER — OUTPATIENT (OUTPATIENT)
Dept: OUTPATIENT SERVICES | Facility: HOSPITAL | Age: 54
LOS: 1 days | End: 2019-08-12
Payer: COMMERCIAL

## 2019-08-12 ENCOUNTER — APPOINTMENT (OUTPATIENT)
Dept: CT IMAGING | Facility: CLINIC | Age: 54
End: 2019-08-12
Payer: COMMERCIAL

## 2019-08-12 DIAGNOSIS — Z98.89 OTHER SPECIFIED POSTPROCEDURAL STATES: Chronic | ICD-10-CM

## 2019-08-12 DIAGNOSIS — R93.89 ABNORMAL FINDINGS ON DIAGNOSTIC IMAGING OF OTHER SPECIFIED BODY STRUCTURES: ICD-10-CM

## 2019-08-12 DIAGNOSIS — M71.30 OTHER BURSAL CYST, UNSPECIFIED SITE: Chronic | ICD-10-CM

## 2019-08-12 LAB
BASOPHILS # BLD AUTO: 0.02 K/UL
BASOPHILS NFR BLD AUTO: 0.7 %
EOSINOPHIL # BLD AUTO: 0.07 K/UL
EOSINOPHIL NFR BLD AUTO: 2.4 %
HCT VFR BLD CALC: 40.7 %
HGB BLD-MCNC: 11.7 G/DL
IMM GRANULOCYTES NFR BLD AUTO: 0.3 %
LYMPHOCYTES # BLD AUTO: 0.86 K/UL
LYMPHOCYTES NFR BLD AUTO: 30 %
MAN DIFF?: NORMAL
MCHC RBC-ENTMCNC: 21.6 PG
MCHC RBC-ENTMCNC: 28.7 GM/DL
MCV RBC AUTO: 75.1 FL
MONOCYTES # BLD AUTO: 0.21 K/UL
MONOCYTES NFR BLD AUTO: 7.3 %
NEUTROPHILS # BLD AUTO: 1.7 K/UL
NEUTROPHILS NFR BLD AUTO: 59.3 %
PLATELET # BLD AUTO: 270 K/UL
RBC # BLD: 5.42 M/UL
RBC # FLD: 16.2 %
WBC # FLD AUTO: 2.87 K/UL

## 2019-08-12 PROCEDURE — 71250 CT THORAX DX C-: CPT

## 2019-08-12 PROCEDURE — 71250 CT THORAX DX C-: CPT | Mod: 26

## 2019-08-13 ENCOUNTER — NON-APPOINTMENT (OUTPATIENT)
Age: 54
End: 2019-08-13

## 2019-08-13 ENCOUNTER — APPOINTMENT (OUTPATIENT)
Dept: PULMONOLOGY | Facility: CLINIC | Age: 54
End: 2019-08-13
Payer: COMMERCIAL

## 2019-08-13 VITALS
SYSTOLIC BLOOD PRESSURE: 120 MMHG | RESPIRATION RATE: 17 BRPM | HEART RATE: 111 BPM | BODY MASS INDEX: 29.23 KG/M2 | DIASTOLIC BLOOD PRESSURE: 80 MMHG | HEIGHT: 63 IN | WEIGHT: 165 LBS | OXYGEN SATURATION: 98 %

## 2019-08-13 PROCEDURE — 95012 NITRIC OXIDE EXP GAS DETER: CPT

## 2019-08-13 PROCEDURE — 94010 BREATHING CAPACITY TEST: CPT

## 2019-08-13 PROCEDURE — 99214 OFFICE O/P EST MOD 30 MIN: CPT | Mod: 25

## 2019-08-15 ENCOUNTER — APPOINTMENT (OUTPATIENT)
Dept: THORACIC SURGERY | Facility: CLINIC | Age: 54
End: 2019-08-15
Payer: COMMERCIAL

## 2019-08-15 ENCOUNTER — OTHER (OUTPATIENT)
Age: 54
End: 2019-08-15

## 2019-08-15 VITALS
BODY MASS INDEX: 29.23 KG/M2 | DIASTOLIC BLOOD PRESSURE: 87 MMHG | HEART RATE: 108 BPM | RESPIRATION RATE: 16 BRPM | HEIGHT: 63 IN | WEIGHT: 165 LBS | TEMPERATURE: 97.9 F | OXYGEN SATURATION: 100 % | SYSTOLIC BLOOD PRESSURE: 125 MMHG

## 2019-08-15 PROCEDURE — 99245 OFF/OP CONSLTJ NEW/EST HI 55: CPT

## 2019-08-15 NOTE — PHYSICAL EXAM
[General Appearance - Alert] : alert [General Appearance - In No Acute Distress] : in no acute distress [Hearing Threshold Finger Rub Not Porter] : hearing was normal [Extraocular Movements] : extraocular movements were intact [] : no respiratory distress [Respiration, Rhythm And Depth] : normal respiratory rhythm and effort [Exaggerated Use Of Accessory Muscles For Inspiration] : no accessory muscle use [Auscultation Breath Sounds / Voice Sounds] : lungs were clear to auscultation bilaterally [Heart Rate And Rhythm] : heart rate was normal and rhythm regular [Diminished Respiratory Excursion] : normal chest expansion [Bowel Sounds] : normal bowel sounds [Abdomen Soft] : soft [Abdomen Tenderness] : non-tender [Cervical Lymph Nodes Enlarged Posterior Bilaterally] : posterior cervical [Cervical Lymph Nodes Enlarged Anterior Bilaterally] : anterior cervical [Supraclavicular Lymph Nodes Enlarged Bilaterally] : supraclavicular [Abnormal Walk] : normal gait [Skin Color & Pigmentation] : normal skin color and pigmentation [No Focal Deficits] : no focal deficits [Oriented To Time, Place, And Person] : oriented to person, place, and time [Impaired Insight] : insight and judgment were intact [Affect] : the affect was normal [Mood] : the mood was normal

## 2019-08-20 ENCOUNTER — OUTPATIENT (OUTPATIENT)
Dept: OUTPATIENT SERVICES | Facility: HOSPITAL | Age: 54
LOS: 1 days | End: 2019-08-20
Payer: COMMERCIAL

## 2019-08-20 VITALS
OXYGEN SATURATION: 97 % | DIASTOLIC BLOOD PRESSURE: 80 MMHG | WEIGHT: 164.02 LBS | HEIGHT: 64 IN | HEART RATE: 90 BPM | TEMPERATURE: 98 F | RESPIRATION RATE: 14 BRPM | SYSTOLIC BLOOD PRESSURE: 110 MMHG

## 2019-08-20 DIAGNOSIS — Z98.89 OTHER SPECIFIED POSTPROCEDURAL STATES: Chronic | ICD-10-CM

## 2019-08-20 DIAGNOSIS — M71.30 OTHER BURSAL CYST, UNSPECIFIED SITE: Chronic | ICD-10-CM

## 2019-08-20 DIAGNOSIS — R91.1 SOLITARY PULMONARY NODULE: ICD-10-CM

## 2019-08-20 DIAGNOSIS — Z98.1 ARTHRODESIS STATUS: Chronic | ICD-10-CM

## 2019-08-20 DIAGNOSIS — Z98.82 BREAST IMPLANT STATUS: Chronic | ICD-10-CM

## 2019-08-20 DIAGNOSIS — Z90.10 ACQUIRED ABSENCE OF UNSPECIFIED BREAST AND NIPPLE: Chronic | ICD-10-CM

## 2019-08-20 LAB
ALBUMIN SERPL ELPH-MCNC: 4.5 G/DL — SIGNIFICANT CHANGE UP (ref 3.3–5)
ALP SERPL-CCNC: 118 U/L — SIGNIFICANT CHANGE UP (ref 40–120)
ALT FLD-CCNC: 7 U/L — SIGNIFICANT CHANGE UP (ref 4–33)
ANION GAP SERPL CALC-SCNC: 14 MMO/L — SIGNIFICANT CHANGE UP (ref 7–14)
AST SERPL-CCNC: 19 U/L — SIGNIFICANT CHANGE UP (ref 4–32)
BILIRUB SERPL-MCNC: 0.2 MG/DL — SIGNIFICANT CHANGE UP (ref 0.2–1.2)
BLD GP AB SCN SERPL QL: NEGATIVE — SIGNIFICANT CHANGE UP
BUN SERPL-MCNC: 8 MG/DL — SIGNIFICANT CHANGE UP (ref 7–23)
CALCIUM SERPL-MCNC: 9.9 MG/DL — SIGNIFICANT CHANGE UP (ref 8.4–10.5)
CHLORIDE SERPL-SCNC: 98 MMOL/L — SIGNIFICANT CHANGE UP (ref 98–107)
CO2 SERPL-SCNC: 26 MMOL/L — SIGNIFICANT CHANGE UP (ref 22–31)
CREAT SERPL-MCNC: 0.67 MG/DL — SIGNIFICANT CHANGE UP (ref 0.5–1.3)
GLUCOSE SERPL-MCNC: 94 MG/DL — SIGNIFICANT CHANGE UP (ref 70–99)
HCG SERPL-ACNC: < 5 MIU/ML — SIGNIFICANT CHANGE UP
HCT VFR BLD CALC: 40 % — SIGNIFICANT CHANGE UP (ref 34.5–45)
HGB BLD-MCNC: 11.8 G/DL — SIGNIFICANT CHANGE UP (ref 11.5–15.5)
MCHC RBC-ENTMCNC: 21.9 PG — LOW (ref 27–34)
MCHC RBC-ENTMCNC: 29.5 % — LOW (ref 32–36)
MCV RBC AUTO: 74.2 FL — LOW (ref 80–100)
NRBC # FLD: 0 K/UL — SIGNIFICANT CHANGE UP (ref 0–0)
PLATELET # BLD AUTO: 235 K/UL — SIGNIFICANT CHANGE UP (ref 150–400)
PMV BLD: 8.8 FL — SIGNIFICANT CHANGE UP (ref 7–13)
POTASSIUM SERPL-MCNC: 3.4 MMOL/L — LOW (ref 3.5–5.3)
POTASSIUM SERPL-SCNC: 3.4 MMOL/L — LOW (ref 3.5–5.3)
PROT SERPL-MCNC: 7.5 G/DL — SIGNIFICANT CHANGE UP (ref 6–8.3)
RBC # BLD: 5.39 M/UL — HIGH (ref 3.8–5.2)
RBC # FLD: 16.2 % — HIGH (ref 10.3–14.5)
RH IG SCN BLD-IMP: POSITIVE — SIGNIFICANT CHANGE UP
SODIUM SERPL-SCNC: 138 MMOL/L — SIGNIFICANT CHANGE UP (ref 135–145)
WBC # BLD: 3.78 K/UL — LOW (ref 3.8–10.5)
WBC # FLD AUTO: 3.78 K/UL — LOW (ref 3.8–10.5)

## 2019-08-20 PROCEDURE — 93010 ELECTROCARDIOGRAM REPORT: CPT

## 2019-08-20 NOTE — H&P PST ADULT - NSICDXFAMILYHX_GEN_ALL_CORE_FT
FAMILY HISTORY:  Family history of colon cancer  Type 2 diabetes mellitus    Sibling  Still living? Yes, Estimated age: Age Unknown  Type 2 diabetes mellitus, Age at diagnosis: Age Unknown

## 2019-08-20 NOTE — H&P PST ADULT - NSANTHOSAYNRD_GEN_A_CORE
No. NICKOLAS screening performed.  STOP BANG Legend: 0-2 = LOW Risk; 3-4 = INTERMEDIATE Risk; 5-8 = HIGH Risk

## 2019-08-20 NOTE — H&P PST ADULT - BREASTS COMMENTS
hx of bilateral mastectomy with tissue expander 10/2017,  developed staph infection in left breast, tissue expanders removed 11/2018,  S/p ROJELIO 6/6/2019

## 2019-08-20 NOTE — H&P PST ADULT - RESPIRATORY AND THORAX COMMENTS
last episode of wheezing 6/2019, lung nodule increasing in size last episode of wheezing 6/2019, lung nodule increasing in size, was prescribed amitriptyline for neuropathic cough

## 2019-08-20 NOTE — H&P PST ADULT - RS GEN PE MLT RESP DETAILS PC
no rhonchi/clear to auscultation bilaterally/no wheezes/breath sounds equal/no rales no intercostal retractions/no subcutaneous emphysema/good air movement/respirations non-labored/no wheezes/no rales/breath sounds equal/no chest wall tenderness/no rhonchi/clear to auscultation bilaterally

## 2019-08-20 NOTE — H&P PST ADULT - NEGATIVE RESPIRATORY AND THORAX SYMPTOMS
no cough/no wheezing/no pleuritic chest pain/no dyspnea/no hemoptysis no dyspnea/no wheezing/no hemoptysis/no pleuritic chest pain

## 2019-08-20 NOTE — H&P PST ADULT - NSICDXPROBLEM_GEN_ALL_CORE_FT
PROBLEM DIAGNOSES   Problem: Solitary pulmonary nodule  Assessment and Plan: Pt scheduled for flexible bronchoscopy, left video assisted thoracoscopy, wedge resection on 8/30/2019.  labs done results pending, ekg done.  Hibiclens provide:  with verbal and written instructions, with teach back, pt able to verbalize understanding.  Preop teaching done, pt able to verbalize understanding.

## 2019-08-20 NOTE — H&P PST ADULT - GASTROINTESTINAL DETAILS
soft/nontender/no distention/bowel sounds normal soft/bowel sounds normal/no rigidity/no organomegaly/nontender/no bruit/no distention/no masses palpable/no guarding

## 2019-08-20 NOTE — H&P PST ADULT - HISTORY OF PRESENT ILLNESS
53y/o female scheduled for flexible bronchoscopy, left video assited thoracoscopy, wedge resection on 8/30/2019.  Pt states, " 53y/o female scheduled for flexible bronchoscopy, left video assisted thoracoscopy, wedge resection on 8/30/2019.  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  Dx with left breast cancer 2017, s/p bilateral mastectomy with chemo and radiation.  Recent chest ct shows left lung nodule increasing in size.  Was started on Gammagard infusions weekly approx 5 weeks ago."

## 2019-08-20 NOTE — H&P PST ADULT - NSICDXPASTSURGICALHX_GEN_ALL_CORE_FT
PAST SURGICAL HISTORY:  H/O endoscopic sinus surgery rhinoplasty 5/2015    H/O lymph node excision 09/2010, benign    S/P thoracotomy 06/30/2013  Right VATS  Wedge resection    Synovial cyst L5- S1   08/26/2016 back surgery PAST SURGICAL HISTORY:  H/O endoscopic sinus surgery rhinoplasty 5/2015    H/O lymph node excision 09/2010, benign    H/O mastectomy bilateral, placement of tissue expanders 10/30/2017    S/P breast reconstruction tissue expanders removed secondary to left staph infection    S/P breast reconstruction ROJELIO 6/6/2019    S/P lumbar fusion 3/2016    S/P thoracotomy 06/30/2013  Right VATS  Wedge resection    Synovial cyst L5- S1   08/26/2016 back surgery

## 2019-08-20 NOTE — H&P PST ADULT - NSICDXPASTMEDICALHX_GEN_ALL_CORE_FT
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

## 2019-08-20 NOTE — H&P PST ADULT - NEGATIVE GENERAL GENITOURINARY SYMPTOMS
no bladder infections/no dysuria/no hematuria/no flank pain L/no flank pain R/normal urinary frequency

## 2019-08-20 NOTE — H&P PST ADULT - MUSCULOSKELETAL
details… detailed exam no joint warmth/no joint swelling/no joint erythema/no calf tenderness/normal strength/ROM intact ROM intact/normal strength

## 2019-08-20 NOTE — H&P PST ADULT - PRIMARY CARE PROVIDER
Aldo Ruiz pmd  [phone] Aldo Ruiz pmd  [phone], Dr. Yosi Robledo oncologist [place]

## 2019-08-20 NOTE — H&P PST ADULT - NEUROLOGICAL DETAILS
cranial nerves intact/responds to verbal commands/alert and oriented x 3/responds to pain/normal strength

## 2019-08-26 NOTE — ASSESSMENT
[FreeTextEntry1] : Ms. Kunz is a 53 year old female with a history of chronic sinus disease, GERD, Hashimoto's thyroiditis, BOOP, IgG deficiency, recent iron deficient anemia, asthma (eosinophilic) coming in for pulmonary reevaluation. She is s/p mastectomy and s/p hospitalization for "expander" Staph infection. She is now recovered and s/p Taxol, Herceptin and Perjetta for her breast cancer. She is now s/p radiation therapy, and presents to the office today with improved cough c/w asthma/allergies and is here s/p breast surgery - post-op abnormal CT scan (new nodules) and cough \par \par ************************************************preoperative pulmonary clearance************************************************\par -bronchoscopy / thorascopy with Dr. Huerta on 8/30/2019\par -at this point in time there are no absolute pulmonary contraindications to go forward with the planned procedure \par -at the time of surgery s/he should have optimal pain control, incentive spirometry, early ambulation, DVT and GI prophylaxis. 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 inflammatory disease\par -if CT (8/13) is worsened, get biopsy (Dr. Robert Huerta)\par -CAT scans are the only radiological modality to identify abnormalities w/in the lungs with regards to nodules/masses/lymph nodes. Risks, benefits were reviewed in detail. The guidelines for abnormalities include follow up CT scans at various intervals which could range from 6 weeks to 1 year intervals. If there is a change for the worse then consideration for a biopsy will be considered if you are a candidate. Second opinion evaluation with a thoracic surgeon or an interventional radiologist could be offered. \par \par problem 2: BOOP (controlled)\par -diagnosed by right VATs \par -follow up chest CT in 6/2019\par -s/p Zithromax rx past\par \par problem 3a: severe persistent asthma -(improved)\par -continue Bevespi 2 puffs\par -continue Asmanex 2 puffs BID\par -continue Singulair 10mg QHS\par -continue levalbuterol 0.31 by the nebulizer up to QID\par -followed by Atrovent by the nebulizer up to QID\par -followed by Pulmicort 0.5 by the nebulizer BID \par -Asthma is  believed to be caused by inherited (genetic) and environmental factor, but its exact cause is unknown. Asthma may be triggered by allergens, lung infections, or irritants in the air. Asthma triggers are different for each person\par -Inhaler technique reviewed as well as oral hygiene techniques reviewed with patient. Avoidance of cold air, extremes of temperature, rescue inhaler should be used before exercise. Order of medication reviewed with patient. Recommended use of a cool mist humidifier in the bedroom. \par \par problem 3b: eosinophilic  asthma\par -off Nucala ; Consider Fasenra \par -The safety and efficacy of Nucala was established in three double-blind, randomized, placebo-controlled trials in patients with severe asthma. Compared to a placebo, patients with severe asthma receiving Nucala had fewer exacerbation requiring hospitalization and/or emergency department visits, and a longer time to first exacerbation.  In addition, patients with severe asthma receiving Nucala experienced greater reductions in their daily maintenance oral corticosteroid dose, while maintaining asthma control compared with patients receiving placebo. Treatment with Nucala did not result in a significant improvement in lung function, as measured by the volume of air exhaled by patients in one second. The most common side effects include: headache, injection site reactions, back pain, weakness, and fatigue; hypersensitivity reactions can occur within hours or days including swelling of the face, mouth, and tongue, fainting, dizziness, hives, breathing problems, and rash; herpes zoster infections have occurred. The drug is a monoclonal antibody that inhibits interleukin -5 which helps regular eosinophils, a type of white blood cell that contributes to asthma. The over-production of eosinophils can cause inflammation in the lungs, increasing the frequency of asthma attacks. Patients must also take other medications, including high dose inhaled corticosteroids and at least one additional asthma drug. \par \par problem 4: allergic rhinitis\par -recommended Xlear or nasal saline, Navage.\par -recommended Sinugator \par -Environmental measures for allergies were encouraged including mattress and pillow cover, air purifier, and environmental controls. \par \par problem 5: GERD/LPR\par - Zantac 300 mg QHS \par - Recommended Apple cider vineger tablets. \par -Rule of 2s: avoid eating too much, eating too late, eating too spicy, eating two hours before bed\par -Things to avoid including overeating, spicy foods, tight clothing, eating within three hours of bed, this list is not all inclusive. \par -For treatment of reflux, possible options discussed including diet control, H2 blockers, PPIs, as well as coating motility agents discussed as treatment options. Timing of meals and proximity of last meal to sleep were discussed. If symptoms persist, a formal gastrointestinal evaluation is needed. \par \par problem 6: sensory neuropathic cough\par -add Amitriptyline 10 mg up to TID, QHS \par -Sensory neuropathic cough is an etiology of cough that is often realized once someone has been ruled out for common disease such as: asthma, COPD, eosinophilic bronchitis, bronchiectasis, post nasal drip, and GERD. It sometimes develops following a URI, herpes zoster outbreak in pharynx or thyroid or cervical spine injury. However, many patients have no identifiable antecedent explanation. \par \par problem 7: r/o diaphragm dysfunction\par -fluoroscopy of the diaphragm was all normal\par \par problem 8: low immunity \par -continue to use Gammagard weekly \par -recommended early intervention  \par -s/p Prevnar 13 vaccine \par \par problem 9: health maintenance\par -s/p 2018 flu shot\par -s/p 1st Shingrix vaccine, s/p 2nd shot\par -recommended strep pneumonia vaccines: Prevnar-13 vaccine, followed by Pneumo vaccine 23 on year following\par -recommended early intervention for URIs\par -recommended osteoporosis evaluations\par -recommended early dermatological evaluations\par -recommended after the age of 50 to the age of 70, colonoscopy every 5 years\par \par F/U in 3 months.\par She is encouraged to call with any changes, concerns, or questions

## 2019-08-26 NOTE — HISTORY OF PRESENT ILLNESS
[FreeTextEntry1] : Ms. Kunz is a 54 year old female with a history of abnormal chest CT, severe persistent asthma, BOOP, cough, eosinophilic asthma/PNA, GERD, lung nodules, PND, and SOB presenting to the office today for a follow up visit. Her chief complaint is cough.\par -she reports that her cough has been largely not under control. she adds that her cough is slightly better when the air is clear, however it gets much worse during hot/humid days. her cough has never been productive\par -she adds that her cough often comes as a result of laying down flat on her back \par -she has stopped Nucala since June as she felt she was not seeing any benefit from the therapy anymore. she has been using all of her inhalers / nebulizers as directed\par -her sinuses have been generally clear and she occasionally uses her [place] pot\par -she has been using Zantac to control her reflux\par -she denies any headaches, nausea, vomiting, fever, chills, sweats, chest pain, chest pressure, diarrhea, constipation, dysphagia, dizziness, leg swelling, leg pain, itchy eyes, itchy ears, heartburn, reflux, or sour taste in the mouth.

## 2019-08-26 NOTE — PROCEDURE
[FreeTextEntry1] : PFT - spi reveals normal flows; FEV1 is 1.98 which is 75% of predicted, normal flow volume loop \par \par Chest CT (July 1, 2019) reveals multiple new nodules identified the largest of which is seen in the left lung base measuring 1.4 cm. Scattered other nodules measure between 0.2 and 0.5 cm and are also new since the previous exam (RLL and LLL). Minimal b/l reticular opacities are noted.\par \par FENO was 21; a normal value being less than 25\par \par Fractional exhaled nitric oxide (FENO) is regarded as a simple, noninvasive method for assessing eosinophilic airway inflammation. Produced by a variety of cells within the lung, nitric oxide (NO) concentrations are generally low in healthy individuals. However, high concentrations of NO appear to be involved in nonspecific host defense mechanisms and chronic inflammatory diseases such as asthma. The American Thoracic Society (ATS) therefore has recommended using FENO to aid in the diagnosis and monitoring of eosinophilic airway inflammation and asthma, and for identifying steroid responsive individuals whose chronic respiratory symptoms may be caused by airway inflammation.

## 2019-08-26 NOTE — REASON FOR VISIT
[Follow-Up] : a follow-up visit [FreeTextEntry1] : abnormal chest CT, severe persistent asthma, BOOP, cough, eosinophilic asthma/PNA, GERD, lung nodules, PND, and SOB

## 2019-08-26 NOTE — PHYSICAL EXAM
[Normal Appearance] : normal appearance [General Appearance - Well Developed] : well developed [General Appearance - Well Nourished] : well nourished [Well Groomed] : well groomed [No Deformities] : no deformities [Normal Conjunctiva] : the conjunctiva exhibited no abnormalities [General Appearance - In No Acute Distress] : no acute distress [Normal Oropharynx] : normal oropharynx [Eyelids - No Xanthelasma] : the eyelids demonstrated no xanthelasmas [Neck Appearance] : the appearance of the neck was normal [Neck Cervical Mass (___cm)] : no neck mass was observed [Thyroid Diffuse Enlargement] : the thyroid was not enlarged [Jugular Venous Distention Increased] : there was no jugular-venous distention [Heart Rate And Rhythm] : heart rate and rhythm were normal [Thyroid Nodule] : there were no palpable thyroid nodules [Murmurs] : no murmurs present [Heart Sounds] : normal S1 and S2 [Respiration, Rhythm And Depth] : normal respiratory rhythm and effort [Auscultation Breath Sounds / Voice Sounds] : lungs were clear to auscultation bilaterally [Exaggerated Use Of Accessory Muscles For Inspiration] : no accessory muscle use [Abdomen Soft] : soft [Abdomen Tenderness] : non-tender [Abdomen Mass (___ Cm)] : no abdominal mass palpated [Abnormal Walk] : normal gait [Gait - Sufficient For Exercise Testing] : the gait was sufficient for exercise testing [Cyanosis, Localized] : no localized cyanosis [Nail Clubbing] : no clubbing of the fingernails [Petechial Hemorrhages (___cm)] : no petechial hemorrhages [Skin Color & Pigmentation] : normal skin color and pigmentation [] : no rash [No Venous Stasis] : no venous stasis [Skin Lesions] : no skin lesions [No Xanthoma] : no  xanthoma was observed [No Skin Ulcers] : no skin ulcer [Deep Tendon Reflexes (DTR)] : deep tendon reflexes were 2+ and symmetric [Sensation] : the sensory exam was normal to light touch and pinprick [Oriented To Time, Place, And Person] : oriented to person, place, and time [No Focal Deficits] : no focal deficits [Impaired Insight] : insight and judgment were intact [Affect] : the affect was normal [II] : II [FreeTextEntry1] : I:E ratio 1:3; clear

## 2019-08-26 NOTE — ADDENDUM
[FreeTextEntry1] : All medical record entries made by ke Rachel were at Dr. Munir Malin's, direction and personally dictated by me on 08/13/2019. I have reviewed the chart and agree that the record accurately reflects my personal performance of the history, physical exam, assessment and plan. I have also personally directed, reviewed, and agree with the discharge instructions.

## 2019-08-30 ENCOUNTER — TRANSCRIPTION ENCOUNTER (OUTPATIENT)
Age: 54
End: 2019-08-30

## 2019-08-30 ENCOUNTER — INPATIENT (INPATIENT)
Facility: HOSPITAL | Age: 54
LOS: 1 days | Discharge: HOME | End: 2019-09-01
Attending: THORACIC SURGERY (CARDIOTHORACIC VASCULAR SURGERY) | Admitting: THORACIC SURGERY (CARDIOTHORACIC VASCULAR SURGERY)
Payer: COMMERCIAL

## 2019-08-30 ENCOUNTER — APPOINTMENT (OUTPATIENT)
Dept: THORACIC SURGERY | Facility: HOSPITAL | Age: 54
End: 2019-08-30

## 2019-08-30 ENCOUNTER — RESULT REVIEW (OUTPATIENT)
Age: 54
End: 2019-08-30

## 2019-08-30 VITALS
TEMPERATURE: 98 F | DIASTOLIC BLOOD PRESSURE: 84 MMHG | HEIGHT: 64 IN | OXYGEN SATURATION: 99 % | RESPIRATION RATE: 16 BRPM | SYSTOLIC BLOOD PRESSURE: 120 MMHG | WEIGHT: 164.02 LBS | HEART RATE: 114 BPM

## 2019-08-30 DIAGNOSIS — Z98.82 BREAST IMPLANT STATUS: Chronic | ICD-10-CM

## 2019-08-30 DIAGNOSIS — Z98.89 OTHER SPECIFIED POSTPROCEDURAL STATES: Chronic | ICD-10-CM

## 2019-08-30 DIAGNOSIS — R91.1 SOLITARY PULMONARY NODULE: ICD-10-CM

## 2019-08-30 DIAGNOSIS — M71.30 OTHER BURSAL CYST, UNSPECIFIED SITE: Chronic | ICD-10-CM

## 2019-08-30 DIAGNOSIS — Z90.10 ACQUIRED ABSENCE OF UNSPECIFIED BREAST AND NIPPLE: Chronic | ICD-10-CM

## 2019-08-30 DIAGNOSIS — Z98.1 ARTHRODESIS STATUS: Chronic | ICD-10-CM

## 2019-08-30 LAB
GRAM STN WND: SIGNIFICANT CHANGE UP
HCG UR QL: NEGATIVE — SIGNIFICANT CHANGE UP
RH IG SCN BLD-IMP: POSITIVE — SIGNIFICANT CHANGE UP
SPECIMEN SOURCE: SIGNIFICANT CHANGE UP

## 2019-08-30 PROCEDURE — 71045 X-RAY EXAM CHEST 1 VIEW: CPT | Mod: 26

## 2019-08-30 PROCEDURE — 88312 SPECIAL STAINS GROUP 1: CPT | Mod: 26

## 2019-08-30 PROCEDURE — 88331 PATH CONSLTJ SURG 1 BLK 1SPC: CPT | Mod: 26

## 2019-08-30 PROCEDURE — 32608 THORACOSCOPY W/BX NODULE: CPT

## 2019-08-30 PROCEDURE — 31622 DX BRONCHOSCOPE/WASH: CPT

## 2019-08-30 PROCEDURE — 88307 TISSUE EXAM BY PATHOLOGIST: CPT | Mod: 26

## 2019-08-30 RX ORDER — AMITRIPTYLINE HCL 25 MG
10 TABLET ORAL
Refills: 0 | Status: DISCONTINUED | OUTPATIENT
Start: 2019-08-30 | End: 2019-09-01

## 2019-08-30 RX ORDER — MONTELUKAST 4 MG/1
10 TABLET, CHEWABLE ORAL DAILY
Refills: 0 | Status: DISCONTINUED | OUTPATIENT
Start: 2019-08-30 | End: 2019-09-01

## 2019-08-30 RX ORDER — HEPARIN SODIUM 5000 [USP'U]/ML
5000 INJECTION INTRAVENOUS; SUBCUTANEOUS EVERY 8 HOURS
Refills: 0 | Status: DISCONTINUED | OUTPATIENT
Start: 2019-08-30 | End: 2019-09-01

## 2019-08-30 RX ORDER — GLYCOPYRROLATE AND FORMOTEROL FUMARATE 9; 4.8 UG/1; UG/1
2 AEROSOL, METERED RESPIRATORY (INHALATION)
Refills: 0 | Status: DISCONTINUED | OUTPATIENT
Start: 2019-08-30 | End: 2019-08-30

## 2019-08-30 RX ORDER — SODIUM CHLORIDE 9 MG/ML
1000 INJECTION, SOLUTION INTRAVENOUS
Refills: 0 | Status: DISCONTINUED | OUTPATIENT
Start: 2019-08-30 | End: 2019-08-30

## 2019-08-30 RX ORDER — SENNA PLUS 8.6 MG/1
2 TABLET ORAL AT BEDTIME
Refills: 0 | Status: DISCONTINUED | OUTPATIENT
Start: 2019-08-30 | End: 2019-09-01

## 2019-08-30 RX ORDER — DOCUSATE SODIUM 100 MG
100 CAPSULE ORAL THREE TIMES A DAY
Refills: 0 | Status: DISCONTINUED | OUTPATIENT
Start: 2019-08-30 | End: 2019-09-01

## 2019-08-30 RX ORDER — MOMETASONE FUROATE 220 UG/1
1 INHALANT RESPIRATORY (INHALATION) AT BEDTIME
Refills: 0 | Status: DISCONTINUED | OUTPATIENT
Start: 2019-08-30 | End: 2019-09-01

## 2019-08-30 RX ORDER — SODIUM CHLORIDE 9 MG/ML
1000 INJECTION, SOLUTION INTRAVENOUS
Refills: 0 | Status: DISCONTINUED | OUTPATIENT
Start: 2019-08-30 | End: 2019-09-01

## 2019-08-30 RX ORDER — NALOXONE HYDROCHLORIDE 4 MG/.1ML
0.1 SPRAY NASAL
Refills: 0 | Status: DISCONTINUED | OUTPATIENT
Start: 2019-08-30 | End: 2019-09-01

## 2019-08-30 RX ORDER — HEPARIN SODIUM 5000 [USP'U]/ML
5000 INJECTION INTRAVENOUS; SUBCUTANEOUS ONCE
Refills: 0 | Status: COMPLETED | OUTPATIENT
Start: 2019-08-30 | End: 2019-08-30

## 2019-08-30 RX ORDER — IPRATROPIUM/ALBUTEROL SULFATE 18-103MCG
3 AEROSOL WITH ADAPTER (GRAM) INHALATION EVERY 4 HOURS
Refills: 0 | Status: DISCONTINUED | OUTPATIENT
Start: 2019-08-30 | End: 2019-09-01

## 2019-08-30 RX ORDER — HYDROMORPHONE HYDROCHLORIDE 2 MG/ML
0.5 INJECTION INTRAMUSCULAR; INTRAVENOUS; SUBCUTANEOUS
Refills: 0 | Status: DISCONTINUED | OUTPATIENT
Start: 2019-08-30 | End: 2019-09-01

## 2019-08-30 RX ORDER — BUDESONIDE, MICRONIZED 100 %
0.5 POWDER (GRAM) MISCELLANEOUS DAILY
Refills: 0 | Status: DISCONTINUED | OUTPATIENT
Start: 2019-08-30 | End: 2019-08-31

## 2019-08-30 RX ORDER — HYDROMORPHONE HYDROCHLORIDE 2 MG/ML
0.5 INJECTION INTRAMUSCULAR; INTRAVENOUS; SUBCUTANEOUS
Refills: 0 | Status: DISCONTINUED | OUTPATIENT
Start: 2019-08-30 | End: 2019-08-31

## 2019-08-30 RX ORDER — LEVOTHYROXINE SODIUM 125 MCG
75 TABLET ORAL DAILY
Refills: 0 | Status: DISCONTINUED | OUTPATIENT
Start: 2019-08-30 | End: 2019-09-01

## 2019-08-30 RX ORDER — ALBUTEROL 90 UG/1
2 AEROSOL, METERED ORAL EVERY 6 HOURS
Refills: 0 | Status: DISCONTINUED | OUTPATIENT
Start: 2019-08-30 | End: 2019-08-30

## 2019-08-30 RX ORDER — ONDANSETRON 8 MG/1
4 TABLET, FILM COATED ORAL ONCE
Refills: 0 | Status: DISCONTINUED | OUTPATIENT
Start: 2019-08-30 | End: 2019-08-31

## 2019-08-30 RX ORDER — HYDROMORPHONE HYDROCHLORIDE 2 MG/ML
30 INJECTION INTRAMUSCULAR; INTRAVENOUS; SUBCUTANEOUS
Refills: 0 | Status: DISCONTINUED | OUTPATIENT
Start: 2019-08-30 | End: 2019-09-01

## 2019-08-30 RX ORDER — FAMOTIDINE 10 MG/ML
20 INJECTION INTRAVENOUS
Refills: 0 | Status: DISCONTINUED | OUTPATIENT
Start: 2019-08-30 | End: 2019-09-01

## 2019-08-30 RX ORDER — LETROZOLE 2.5 MG/1
2.5 TABLET, FILM COATED ORAL DAILY
Refills: 0 | Status: DISCONTINUED | OUTPATIENT
Start: 2019-08-30 | End: 2019-09-01

## 2019-08-30 RX ORDER — ONDANSETRON 8 MG/1
4 TABLET, FILM COATED ORAL EVERY 6 HOURS
Refills: 0 | Status: DISCONTINUED | OUTPATIENT
Start: 2019-08-30 | End: 2019-09-01

## 2019-08-30 RX ADMIN — HEPARIN SODIUM 5000 UNIT(S): 5000 INJECTION INTRAVENOUS; SUBCUTANEOUS at 21:40

## 2019-08-30 RX ADMIN — SENNA PLUS 2 TABLET(S): 8.6 TABLET ORAL at 21:39

## 2019-08-30 RX ADMIN — Medication 100 MILLIGRAM(S): at 21:39

## 2019-08-30 RX ADMIN — MONTELUKAST 10 MILLIGRAM(S): 4 TABLET, CHEWABLE ORAL at 21:39

## 2019-08-30 RX ADMIN — HEPARIN SODIUM 5000 UNIT(S): 5000 INJECTION INTRAVENOUS; SUBCUTANEOUS at 09:29

## 2019-08-30 RX ADMIN — HYDROMORPHONE HYDROCHLORIDE 30 MILLILITER(S): 2 INJECTION INTRAMUSCULAR; INTRAVENOUS; SUBCUTANEOUS at 17:15

## 2019-08-30 RX ADMIN — Medication 10 MILLIGRAM(S): at 21:39

## 2019-08-30 NOTE — ASU PATIENT PROFILE, ADULT - PSH
H/O endoscopic sinus surgery  rhinoplasty 5/2015  H/O lymph node excision  09/2010, benign  H/O mastectomy  bilateral, placement of tissue expanders 10/30/2017  S/P breast reconstruction  ROJELIO 6/6/2019  S/P breast reconstruction  tissue expanders removed secondary to left staph infection  S/P lumbar fusion  3/2016  S/P thoracotomy  06/30/2013  Right VATS  Wedge resection  Synovial cyst  L5- S1   08/26/2016 back surgery

## 2019-08-30 NOTE — ASU PATIENT PROFILE, ADULT - PMH
Asthma  controlled on meds  Autoimmune disorder  Auto immune lung disorder  immunoglobulin  deficiency(IgG)  Intertitial Cellular pneumonia  -2013 VATS/ wedge rsx  BOOP (bronchiolitis obliterans with organizing pneumonia)    Breast cancer  s/p chemo and radiation 2017  Chronic sinusitis  frontal, maxillary  h/o sinus surgery  Cough  neuropathic  GERD (gastroesophageal reflux disease)    GERD (gastroesophageal reflux disease)    Hypothyroid    Lung nodules    Migraine  "not for a while now"  Rosacea    Solitary pulmonary nodule

## 2019-08-31 LAB
ANION GAP SERPL CALC-SCNC: 13 MMO/L — SIGNIFICANT CHANGE UP (ref 7–14)
BASOPHILS # BLD AUTO: 0.02 K/UL — SIGNIFICANT CHANGE UP (ref 0–0.2)
BASOPHILS NFR BLD AUTO: 0.4 % — SIGNIFICANT CHANGE UP (ref 0–2)
BUN SERPL-MCNC: 10 MG/DL — SIGNIFICANT CHANGE UP (ref 7–23)
CALCIUM SERPL-MCNC: 9.2 MG/DL — SIGNIFICANT CHANGE UP (ref 8.4–10.5)
CHLORIDE SERPL-SCNC: 101 MMOL/L — SIGNIFICANT CHANGE UP (ref 98–107)
CO2 SERPL-SCNC: 22 MMOL/L — SIGNIFICANT CHANGE UP (ref 22–31)
CREAT SERPL-MCNC: 0.54 MG/DL — SIGNIFICANT CHANGE UP (ref 0.5–1.3)
CULTURE - ACID FAST SMEAR CONCENTRATED: SIGNIFICANT CHANGE UP
EOSINOPHIL # BLD AUTO: 0.01 K/UL — SIGNIFICANT CHANGE UP (ref 0–0.5)
EOSINOPHIL NFR BLD AUTO: 0.2 % — SIGNIFICANT CHANGE UP (ref 0–6)
GLUCOSE SERPL-MCNC: 116 MG/DL — HIGH (ref 70–99)
HCT VFR BLD CALC: 39 % — SIGNIFICANT CHANGE UP (ref 34.5–45)
HGB BLD-MCNC: 11.1 G/DL — LOW (ref 11.5–15.5)
IMM GRANULOCYTES NFR BLD AUTO: 0.4 % — SIGNIFICANT CHANGE UP (ref 0–1.5)
LYMPHOCYTES # BLD AUTO: 0.82 K/UL — LOW (ref 1–3.3)
LYMPHOCYTES # BLD AUTO: 16.7 % — SIGNIFICANT CHANGE UP (ref 13–44)
MCHC RBC-ENTMCNC: 21.1 PG — LOW (ref 27–34)
MCHC RBC-ENTMCNC: 28.5 % — LOW (ref 32–36)
MCV RBC AUTO: 74.1 FL — LOW (ref 80–100)
MONOCYTES # BLD AUTO: 0.31 K/UL — SIGNIFICANT CHANGE UP (ref 0–0.9)
MONOCYTES NFR BLD AUTO: 6.3 % — SIGNIFICANT CHANGE UP (ref 2–14)
NEUTROPHILS # BLD AUTO: 3.72 K/UL — SIGNIFICANT CHANGE UP (ref 1.8–7.4)
NEUTROPHILS NFR BLD AUTO: 76 % — SIGNIFICANT CHANGE UP (ref 43–77)
NRBC # FLD: 0 K/UL — SIGNIFICANT CHANGE UP (ref 0–0)
PLATELET # BLD AUTO: 183 K/UL — SIGNIFICANT CHANGE UP (ref 150–400)
PMV BLD: 8.4 FL — SIGNIFICANT CHANGE UP (ref 7–13)
POTASSIUM SERPL-MCNC: 4.1 MMOL/L — SIGNIFICANT CHANGE UP (ref 3.5–5.3)
POTASSIUM SERPL-SCNC: 4.1 MMOL/L — SIGNIFICANT CHANGE UP (ref 3.5–5.3)
RBC # BLD: 5.26 M/UL — HIGH (ref 3.8–5.2)
RBC # FLD: 16.1 % — HIGH (ref 10.3–14.5)
SODIUM SERPL-SCNC: 136 MMOL/L — SIGNIFICANT CHANGE UP (ref 135–145)
SPECIMEN SOURCE: SIGNIFICANT CHANGE UP
WBC # BLD: 4.9 K/UL — SIGNIFICANT CHANGE UP (ref 3.8–10.5)
WBC # FLD AUTO: 4.9 K/UL — SIGNIFICANT CHANGE UP (ref 3.8–10.5)

## 2019-08-31 PROCEDURE — 71045 X-RAY EXAM CHEST 1 VIEW: CPT | Mod: 26

## 2019-08-31 PROCEDURE — ZZZZZ: CPT

## 2019-08-31 PROCEDURE — 99024 POSTOP FOLLOW-UP VISIT: CPT

## 2019-08-31 RX ORDER — BUDESONIDE, MICRONIZED 100 %
0.5 POWDER (GRAM) MISCELLANEOUS
Refills: 0 | Status: DISCONTINUED | OUTPATIENT
Start: 2019-08-31 | End: 2019-09-01

## 2019-08-31 RX ADMIN — Medication 10 MILLIGRAM(S): at 13:23

## 2019-08-31 RX ADMIN — FAMOTIDINE 20 MILLIGRAM(S): 10 INJECTION INTRAVENOUS at 05:09

## 2019-08-31 RX ADMIN — Medication 100 MILLIGRAM(S): at 22:29

## 2019-08-31 RX ADMIN — HEPARIN SODIUM 5000 UNIT(S): 5000 INJECTION INTRAVENOUS; SUBCUTANEOUS at 22:29

## 2019-08-31 RX ADMIN — FAMOTIDINE 20 MILLIGRAM(S): 10 INJECTION INTRAVENOUS at 17:47

## 2019-08-31 RX ADMIN — MONTELUKAST 10 MILLIGRAM(S): 4 TABLET, CHEWABLE ORAL at 13:23

## 2019-08-31 RX ADMIN — Medication 100 MILLIGRAM(S): at 05:09

## 2019-08-31 RX ADMIN — HEPARIN SODIUM 5000 UNIT(S): 5000 INJECTION INTRAVENOUS; SUBCUTANEOUS at 05:09

## 2019-08-31 RX ADMIN — HYDROMORPHONE HYDROCHLORIDE 30 MILLILITER(S): 2 INJECTION INTRAMUSCULAR; INTRAVENOUS; SUBCUTANEOUS at 19:26

## 2019-08-31 RX ADMIN — Medication 100 MILLIGRAM(S): at 13:23

## 2019-08-31 RX ADMIN — SENNA PLUS 2 TABLET(S): 8.6 TABLET ORAL at 22:29

## 2019-08-31 RX ADMIN — HYDROMORPHONE HYDROCHLORIDE 30 MILLILITER(S): 2 INJECTION INTRAMUSCULAR; INTRAVENOUS; SUBCUTANEOUS at 00:26

## 2019-08-31 RX ADMIN — MOMETASONE FUROATE 1 PUFF(S): 220 INHALANT RESPIRATORY (INHALATION) at 23:34

## 2019-08-31 RX ADMIN — Medication 10 MILLIGRAM(S): at 17:47

## 2019-08-31 RX ADMIN — HEPARIN SODIUM 5000 UNIT(S): 5000 INJECTION INTRAVENOUS; SUBCUTANEOUS at 13:23

## 2019-08-31 RX ADMIN — Medication 0.5 MILLIGRAM(S): at 22:19

## 2019-08-31 RX ADMIN — HYDROMORPHONE HYDROCHLORIDE 30 MILLILITER(S): 2 INJECTION INTRAMUSCULAR; INTRAVENOUS; SUBCUTANEOUS at 07:11

## 2019-08-31 RX ADMIN — Medication 75 MICROGRAM(S): at 05:09

## 2019-08-31 RX ADMIN — LETROZOLE 2.5 MILLIGRAM(S): 2.5 TABLET, FILM COATED ORAL at 05:42

## 2019-08-31 NOTE — DISCHARGE NOTE PROVIDER - CARE PROVIDER_API CALL
Robert Huerta)  Thoracic Surgery  [address] [place] [address] [place] [address]  Phone: [phone]  Fax: [phone]  Follow Up Time:

## 2019-08-31 NOTE — PROGRESS NOTE ADULT - SUBJECTIVE AND OBJECTIVE BOX
Subjective: "I feel ok, this tube just hurts when I move" pt amb over 400' frequently. Using IS. No CP or SOb.     Vital Signs:  Vital Signs Last 24 Hrs  T(C): 36.9 (08-31-19 @ 12:24), Max: 37.1 (08-30-19 @ 16:50)  T(F): 98.5 (08-31-19 @ 12:24), Max: 98.8 (08-30-19 @ 16:50)  HR: 109 (08-31-19 @ 12:24) (70 - 114)  BP: 113/75 (08-31-19 @ 12:24) (102/72 - 143/94)  RR: 17 (08-31-19 @ 12:24) (16 - 26)  SpO2: 97% (08-31-19 @ 12:24) (95% - 100%) on (O2)    Telemetry/Alarms:  General: WN/WD NAD  Neurology: Awake, nonfocal, KNAPP x 4  Eyes: Scleras clear, PERRLA/ EOMI, Gross vision intact  ENT:Gross hearing intact, grossly patent pharynx, no stridor  Neck: Neck supple, trachea midline, No JVD,   Respiratory: CTA B/L, No wheezing, rales, rhonchi  CV: RRR, S1S2, no murmurs, rubs or gallops  Abdominal: Soft, NT, ND +BS, +void, no BM  Extremities: No edema, + peripheral pulses  Skin: No Rashes, Hematoma, Ecchymosis  Lymphatic: No Neck, axilla, groin LAD  Psych: Oriented x 3, normal affect  Incisions: left VATS c/d/i.   Tubes: left CT 15cc/24hrs on WS, sml FEAL  Relevant labs, radiology and Medications reviewed                        11.1   4.90  )-----------( 183      ( 31 Aug 2019 06:07 )             39.0     08-31    136  |  101  |  10  ----------------------------<  116<H>  4.1   |  22  |  0.54    Ca    9.2      31 Aug 2019 06:07        MEDICATIONS  (STANDING):  amitriptyline 10 milliGRAM(s) Oral two times a day  buDESOnide    Inhalation Suspension 0.5 milliGRAM(s) Inhalation two times a day  docusate sodium 100 milliGRAM(s) Oral three times a day  famotidine    Tablet 20 milliGRAM(s) Oral two times a day  heparin  Injectable 5000 Unit(s) SubCutaneous every 8 hours  HYDROmorphone PCA (1 mG/mL) 30 milliLiter(s) PCA Continuous PCA Continuous  lactated ringers. 1000 milliLiter(s) (30 mL/Hr) IV Continuous <Continuous>  letrozole 2.5 milliGRAM(s) Oral daily  levothyroxine 75 MICROGram(s) Oral daily  mometasone 220 MICROgram(s) Inhaler 1 Puff(s) Inhalation at bedtime  montelukast 10 milliGRAM(s) Oral daily  senna 2 Tablet(s) Oral at bedtime    MEDICATIONS  (PRN):  ALBUTerol/ipratropium for Nebulization. 3 milliLiter(s) Nebulizer every 4 hours PRN Shortness of Breath and/or Wheezing  HYDROmorphone PCA (1 mG/mL) Rescue Clinician Bolus 0.5 milliGRAM(s) IV Push every 15 minutes PRN for Pain Scale GREATER THAN 6  naloxone Injectable 0.1 milliGRAM(s) IV Push every 3 minutes PRN For ANY of the following changes in patient status:  A. RR LESS THAN 10 breaths per minute, B. Oxygen saturation LESS THAN 90%, C. Sedation score of 6  ondansetron Injectable 4 milliGRAM(s) IV Push every 6 hours PRN Nausea    Pertinent Physical Exam  I&O's Summary    30 Aug 2019 07:01  -  31 Aug 2019 07:00  --------------------------------------------------------  IN: 1190 mL / OUT: 1215 mL / NET: -25 mL    31 Aug 2019 07:01  -  31 Aug 2019 13:25  --------------------------------------------------------  IN: 0 mL / OUT: 300 mL / NET: -300 mL        Assessment  54y Female  w/ PAST MEDICAL & SURGICAL HISTORY:  BOOP (bronchiolitis obliterans with organizing pneumonia)  Breast cancer: s/p chemo and radiation 2017  Cough: neuropathic  Solitary pulmonary nodule  Chronic sinusitis: frontal, maxillary  h/o sinus surgery  Lung nodules  Rosacea  GERD (gastroesophageal reflux disease)  Autoimmune disorder: Auto immune lung disorder  immunoglobulin  deficiency(IgG)  Intertitial Cellular pneumonia  -2013 VATS/ wedge rsx  GERD (gastroesophageal reflux disease)  Hypothyroid  Migraine: &quot;not for a while now&quot;  Asthma: controlled on meds  S/P breast reconstruction: ROJELIO 6/6/2019  S/P breast reconstruction: tissue expanders removed secondary to left staph infection  H/O mastectomy: bilateral, placement of tissue expanders 10/30/2017  S/P lumbar fusion: 3/2016  H/O lymph node excision: 09/2010, benign  Synovial cyst: L5- S1   08/26/2016 back surgery  S/P thoracotomy: 06/30/2013  Right VATS  Wedge resection  H/O endoscopic sinus surgery: rhinoplasty 5/2015  admitted with complaints of Patient is a 54y old  Female who presents with a chief complaint of "I'm having lung surgery." (20 Aug 2019 19:33)  55yo F s/p Left VATS LLL wedge resection. Post op non complicated. Pt has sml air leak.   PLAN  Neuro: Pain management  Pulm: Encourage coughing, deep breathing and use of incentive spirometry. Wean off supplemental oxygen as able. Daily CXR.   Cardio: Monitor telemetry/alarms  GI: Tolerating diet. Continue stool softeners.  Renal: monitor urine output, supplement electrolytes as needed  Vasc: Heparin SC/SCDs for DVT prophylaxis  Heme: Stable H/H. .   ID: Off antibiotics. Stable.  Therapy: OOB/ambulate  Tubes: Monitor Chest tube output and air leak, cont to WS.   Disposition: Aim to D/C to home once CT removed.   Discussed with Cardiothoracic Team at AM rounds.

## 2019-08-31 NOTE — DISCHARGE NOTE PROVIDER - NSDCFUADDAPPT_GEN_ALL_CORE_FT
Follow up with Dr. Huerta in 7-10 days  Chest x-ray 1-2 days prior to appointment with Dr. Huerta.  Please bring copy of x-ray to appointment with Dr. Huerta   Follow up with primary care provider in one week Follow up with Dr. Huerta in 7-10 days Call for appointment  Chest x-ray 1-2 days prior to appointment with Dr. Huerta.  Please bring copy of x-ray to appointment with Dr. Huerta   Follow up with primary care provider in one week

## 2019-08-31 NOTE — DISCHARGE NOTE PROVIDER - HOSPITAL COURSE
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  Dx with left breast cancer 2017, s/p bilateral mastectomy with chemo and radiation.  Recent chest ct shows left lung nodule increasing in size.  Was started on Gammagard infusions weekly approx 5 weeks ago." Patient s/p left vats, left lower lobe wedge resection on 8/30/19. Post op course without complication, patient stable for discharge home when chest tube removed.

## 2019-08-31 NOTE — DISCHARGE NOTE PROVIDER - NSDCACTIVITY_GEN_ALL_CORE
Do not drive or operate machinery/Showering allowed/Walking - Outdoors allowed/Do not make important decisions/Walking - Indoors allowed/Stairs allowed/No heavy lifting/straining

## 2019-08-31 NOTE — PROGRESS NOTE ADULT - SUBJECTIVE AND OBJECTIVE BOX
Patient s/p left vats, left lower wedge resection, IV PCA in place, patient resting comfortably.  Chest tube to water seal, no air leak noted.  Incision with dressing with serous sang drainage, dressing changed Patient s/p left vats, left lower wedge resection, IV PCA in place, patient resting comfortably.  Chest tube to water seal, no air leak noted.  Incision with dressing with serous sang drainage, dressing changed.  Tolerating po, voiding.  Vital Signs Last 24 Hrs  T(C): 36.6 (30 Aug 2019 23:44), Max: 37.1 (30 Aug 2019 16:50)  T(F): 97.8 (30 Aug 2019 23:44), Max: 98.8 (30 Aug 2019 16:50)  HR: 73 (30 Aug 2019 23:44) (73 - 114)  BP: 130/77 (30 Aug 2019 23:44) (108/78 - 143/94)  BP(mean): 84 (30 Aug 2019 23:00) (83 - 106)  RR: 16 (30 Aug 2019 23:44) (16 - 26)  SpO2: 97% (30 Aug 2019 23:44) (95% - 99%)    I&O's Detail    30 Aug 2019 07:01  -  31 Aug 2019 00:48  --------------------------------------------------------  IN:    lactated ringers.: 30 mL    lactated ringers.: 210 mL    Oral Fluid: 950 mL  Total IN: 1190 mL    OUT:    Chest Tube: 15 mL    Voided: 500 mL  Total OUT: 515 mL    Total NET: 675 mL    MEDICATIONS  (STANDING):  amitriptyline 10 milliGRAM(s) Oral two times a day  buDESOnide    Inhalation Suspension 0.5 milliGRAM(s) Inhalation daily  docusate sodium 100 milliGRAM(s) Oral three times a day  famotidine    Tablet 20 milliGRAM(s) Oral two times a day  heparin  Injectable 5000 Unit(s) SubCutaneous every 8 hours  HYDROmorphone PCA (1 mG/mL) 30 milliLiter(s) PCA Continuous PCA Continuous  lactated ringers. 1000 milliLiter(s) (30 mL/Hr) IV Continuous <Continuous>  letrozole 2.5 milliGRAM(s) Oral daily  levothyroxine 75 MICROGram(s) Oral daily  mometasone 220 MICROgram(s) Inhaler 1 Puff(s) Inhalation at bedtime  montelukast 10 milliGRAM(s) Oral daily  senna 2 Tablet(s) Oral at bedtime

## 2019-08-31 NOTE — PROGRESS NOTE ADULT - ASSESSMENT
Patient s/p left vats, left lower lobe wedge resection    Plan:  Continue chest tube to water seal   Follow up labs and chest x-ray in am  Chest PT, ambulation and incentive spirometer   Continue IV PCA for pain management

## 2019-08-31 NOTE — DISCHARGE NOTE PROVIDER - NSDCFUSCHEDAPPT_GEN_ALL_CORE_FT
CARLITO SHERMAN ; 11/05/2019 ; NPP Puled [address] [facility] CARLITO SHERMAN ; 11/05/2019 ; NPP Puled [address] [facility] CARLITO SHERMAN ; 11/05/2019 ; NPP Puled [address] [facility]

## 2019-08-31 NOTE — PROGRESS NOTE ADULT - SUBJECTIVE AND OBJECTIVE BOX
ANESTHESIA POSTOP CHECK    54y Female POSTOP DAY 1 S/P     Vital Signs Last 24 Hrs  T(C): 36.7 (31 Aug 2019 08:19), Max: 37.1 (30 Aug 2019 16:50)  T(F): 98.1 (31 Aug 2019 08:19), Max: 98.8 (30 Aug 2019 16:50)  HR: 91 (31 Aug 2019 08:19) (70 - 114)  BP: 121/80 (31 Aug 2019 08:19) (102/72 - 143/94)  BP(mean): 84 (30 Aug 2019 23:00) (83 - 106)  RR: 16 (31 Aug 2019 08:19) (16 - 26)  SpO2: 100% (31 Aug 2019 08:19) (95% - 100%)  I&O's Summary    30 Aug 2019 07:01  -  31 Aug 2019 07:00  --------------------------------------------------------  IN: 1190 mL / OUT: 1215 mL / NET: -25 mL        [X ] NO APPARENT ANESTHESIA COMPLICATIONS      Comments:

## 2019-08-31 NOTE — PROGRESS NOTE ADULT - SUBJECTIVE AND OBJECTIVE BOX
Anesthesia Pain Management Service    SUBJECTIVE: Patient is doing well with IV PCA and no significant problems reported.    Pain Scale Score	At rest: ___ 	With Activity: ___ 	[X ] Refer to charted pain scores    THERAPY:    [ ] IV PCA Morphine		[ ] 5 mg/mL	[ ] 1 mg/mL  [X ] IV PCA Hydromorphone	[ ] 5 mg/mL	[X ] 1 mg/mL  [ ] IV PCA Fentanyl		[ ] 50 micrograms/mL    Demand dose __0.2_ lockout __6_ (minutes) Continuous Rate _0__ Total:0.2 ___  Daily      MEDICATIONS  (STANDING):  amitriptyline 10 milliGRAM(s) Oral two times a day  buDESOnide    Inhalation Suspension 0.5 milliGRAM(s) Inhalation daily  docusate sodium 100 milliGRAM(s) Oral three times a day  famotidine    Tablet 20 milliGRAM(s) Oral two times a day  heparin  Injectable 5000 Unit(s) SubCutaneous every 8 hours  HYDROmorphone PCA (1 mG/mL) 30 milliLiter(s) PCA Continuous PCA Continuous  lactated ringers. 1000 milliLiter(s) (30 mL/Hr) IV Continuous <Continuous>  letrozole 2.5 milliGRAM(s) Oral daily  levothyroxine 75 MICROGram(s) Oral daily  mometasone 220 MICROgram(s) Inhaler 1 Puff(s) Inhalation at bedtime  montelukast 10 milliGRAM(s) Oral daily  senna 2 Tablet(s) Oral at bedtime    MEDICATIONS  (PRN):  ALBUTerol/ipratropium for Nebulization. 3 milliLiter(s) Nebulizer every 4 hours PRN Shortness of Breath and/or Wheezing  HYDROmorphone PCA (1 mG/mL) Rescue Clinician Bolus 0.5 milliGRAM(s) IV Push every 15 minutes PRN for Pain Scale GREATER THAN 6  naloxone Injectable 0.1 milliGRAM(s) IV Push every 3 minutes PRN For ANY of the following changes in patient status:  A. RR LESS THAN 10 breaths per minute, B. Oxygen saturation LESS THAN 90%, C. Sedation score of 6  ondansetron Injectable 4 milliGRAM(s) IV Push every 6 hours PRN Nausea      OBJECTIVE:    Sedation Score:	[ X] Alert	[ ] Drowsy 	[ ] Arousable	[ ] Asleep	[ ] Unresponsive    Side Effects:	[X ] None	[ ] Nausea	[ ] Vomiting	[ ] Pruritus  		[ ] Other:    Vital Signs Last 24 Hrs  T(C): 36.7 (31 Aug 2019 08:19), Max: 37.1 (30 Aug 2019 16:50)  T(F): 98.1 (31 Aug 2019 08:19), Max: 98.8 (30 Aug 2019 16:50)  HR: 91 (31 Aug 2019 08:19) (70 - 114)  BP: 121/80 (31 Aug 2019 08:19) (102/72 - 143/94)  BP(mean): 84 (30 Aug 2019 23:00) (83 - 106)  RR: 16 (31 Aug 2019 08:19) (16 - 26)  SpO2: 100% (31 Aug 2019 08:19) (95% - 100%)    ASSESSMENT/ PLAN    Therapy to  be:	[ X] Continue   [ ] Discontinued   [ ] Change to prn Analgesics    Documentation and Verification of current medications:   [X] Done	[ ] Not done, not elligible    Comments:

## 2019-09-01 ENCOUNTER — TRANSCRIPTION ENCOUNTER (OUTPATIENT)
Age: 54
End: 2019-09-01

## 2019-09-01 VITALS
TEMPERATURE: 98 F | OXYGEN SATURATION: 96 % | RESPIRATION RATE: 17 BRPM | DIASTOLIC BLOOD PRESSURE: 73 MMHG | HEART RATE: 112 BPM | SYSTOLIC BLOOD PRESSURE: 108 MMHG

## 2019-09-01 PROCEDURE — 71045 X-RAY EXAM CHEST 1 VIEW: CPT | Mod: 26,77

## 2019-09-01 PROCEDURE — 71045 X-RAY EXAM CHEST 1 VIEW: CPT | Mod: 26

## 2019-09-01 RX ORDER — OXYCODONE AND ACETAMINOPHEN 5; 325 MG/1; MG/1
1 TABLET ORAL EVERY 4 HOURS
Refills: 0 | Status: DISCONTINUED | OUTPATIENT
Start: 2019-09-01 | End: 2019-09-01

## 2019-09-01 RX ADMIN — Medication 100 MILLIGRAM(S): at 06:13

## 2019-09-01 RX ADMIN — FAMOTIDINE 20 MILLIGRAM(S): 10 INJECTION INTRAVENOUS at 06:14

## 2019-09-01 RX ADMIN — Medication 10 MILLIGRAM(S): at 06:14

## 2019-09-01 RX ADMIN — MONTELUKAST 10 MILLIGRAM(S): 4 TABLET, CHEWABLE ORAL at 12:22

## 2019-09-01 RX ADMIN — LETROZOLE 2.5 MILLIGRAM(S): 2.5 TABLET, FILM COATED ORAL at 12:22

## 2019-09-01 RX ADMIN — HYDROMORPHONE HYDROCHLORIDE 30 MILLILITER(S): 2 INJECTION INTRAMUSCULAR; INTRAVENOUS; SUBCUTANEOUS at 07:35

## 2019-09-01 RX ADMIN — Medication 0.5 MILLIGRAM(S): at 08:46

## 2019-09-01 RX ADMIN — HEPARIN SODIUM 5000 UNIT(S): 5000 INJECTION INTRAVENOUS; SUBCUTANEOUS at 06:14

## 2019-09-01 RX ADMIN — Medication 75 MICROGRAM(S): at 06:13

## 2019-09-01 NOTE — PROGRESS NOTE ADULT - SUBJECTIVE AND OBJECTIVE BOX
Anesthesia Pain Management Service    SUBJECTIVE: Patient is doing well with IV PCA and no significant problems reported.    Pain Scale Score	At rest: ___ 	With Activity: ___ 	[X ] Refer to charted pain scores    THERAPY:    [ ] IV PCA Morphine		[ ] 5 mg/mL	[ ] 1 mg/mL  [X ] IV PCA Hydromorphone	[ ] 5 mg/mL	[X ] 1 mg/mL  [ ] IV PCA Fentanyl		[ ] 50 micrograms/mL    Demand dose __0.2_ lockout __6_ (minutes) Continuous Rate _0__ Total: _3.2mg__  Daily      MEDICATIONS  (STANDING):  amitriptyline 10 milliGRAM(s) Oral two times a day  buDESOnide    Inhalation Suspension 0.5 milliGRAM(s) Inhalation two times a day  docusate sodium 100 milliGRAM(s) Oral three times a day  famotidine    Tablet 20 milliGRAM(s) Oral two times a day  heparin  Injectable 5000 Unit(s) SubCutaneous every 8 hours  HYDROmorphone PCA (1 mG/mL) 30 milliLiter(s) PCA Continuous PCA Continuous  lactated ringers. 1000 milliLiter(s) (30 mL/Hr) IV Continuous <Continuous>  letrozole 2.5 milliGRAM(s) Oral daily  levothyroxine 75 MICROGram(s) Oral daily  mometasone 220 MICROgram(s) Inhaler 1 Puff(s) Inhalation at bedtime  montelukast 10 milliGRAM(s) Oral daily  senna 2 Tablet(s) Oral at bedtime    MEDICATIONS  (PRN):  ALBUTerol/ipratropium for Nebulization. 3 milliLiter(s) Nebulizer every 4 hours PRN Shortness of Breath and/or Wheezing  HYDROmorphone PCA (1 mG/mL) Rescue Clinician Bolus 0.5 milliGRAM(s) IV Push every 15 minutes PRN for Pain Scale GREATER THAN 6  naloxone Injectable 0.1 milliGRAM(s) IV Push every 3 minutes PRN For ANY of the following changes in patient status:  A. RR LESS THAN 10 breaths per minute, B. Oxygen saturation LESS THAN 90%, C. Sedation score of 6  ondansetron Injectable 4 milliGRAM(s) IV Push every 6 hours PRN Nausea      OBJECTIVE:    Sedation Score:	[ X] Alert	[ ] Drowsy 	[ ] Arousable	[ ] Asleep	[ ] Unresponsive    Side Effects:	[X ] None	[ ] Nausea	[ ] Vomiting	[ ] Pruritus  		[ ] Other:    Vital Signs Last 24 Hrs  T(C): 36.8 (01 Sep 2019 06:11), Max: 37.2 (31 Aug 2019 16:07)  T(F): 98.2 (01 Sep 2019 06:11), Max: 99 (31 Aug 2019 16:07)  HR: 91 (01 Sep 2019 06:11) (91 - 120)  BP: 106/73 (01 Sep 2019 06:11) (106/73 - 122/74)  BP(mean): --  RR: 18 (01 Sep 2019 06:11) (17 - 18)  SpO2: 96% (01 Sep 2019 06:11) (94% - 98%)    ASSESSMENT/ PLAN    Therapy to  be:	[ X] Continue   [ ] Discontinued   [ ] Change to prn Analgesics    Documentation and Verification of current medications:   [X] Done	[ ] Not done, not elligible    Comments:

## 2019-09-01 NOTE — DISCHARGE NOTE NURSING/CASE MANAGEMENT/SOCIAL WORK - NSDCFUADDAPPT_GEN_ALL_CORE_FT
Follow up with Dr. Huerta in 7-10 days Call for appointment  Chest x-ray 1-2 days prior to appointment with Dr. Huerta.  Please bring copy of x-ray to appointment with Dr. Huerta   Follow up with primary care provider in one week

## 2019-09-01 NOTE — DISCHARGE NOTE NURSING/CASE MANAGEMENT/SOCIAL WORK - PATIENT PORTAL LINK FT
You can access the FollowMyHealth Patient Portal offered by [facility] by registering at the following website: [email or web address]. By joining [email or web address] FollowMyHealth portal, you will also be able to view your health information using other applications (apps) compatible with our system.

## 2019-09-03 PROBLEM — C50.919 MALIGNANT NEOPLASM OF UNSPECIFIED SITE OF UNSPECIFIED FEMALE BREAST: Chronic | Status: ACTIVE | Noted: 2019-08-20

## 2019-09-03 PROBLEM — J84.89 OTHER SPECIFIED INTERSTITIAL PULMONARY DISEASES: Chronic | Status: ACTIVE | Noted: 2019-08-20

## 2019-09-03 PROBLEM — R91.1 SOLITARY PULMONARY NODULE: Chronic | Status: ACTIVE | Noted: 2019-08-20

## 2019-09-03 PROBLEM — R05 COUGH: Chronic | Status: ACTIVE | Noted: 2019-08-20

## 2019-09-03 LAB — CULTURE - SURGICAL SITE: SIGNIFICANT CHANGE UP

## 2019-09-05 LAB — SPECIMEN SOURCE: SIGNIFICANT CHANGE UP

## 2019-09-06 LAB — SPECIMEN SOURCE: SIGNIFICANT CHANGE UP

## 2019-09-07 LAB — SURGICAL PATHOLOGY STUDY: SIGNIFICANT CHANGE UP

## 2019-09-08 ENCOUNTER — FORM ENCOUNTER (OUTPATIENT)
Age: 54
End: 2019-09-08

## 2019-09-09 ENCOUNTER — APPOINTMENT (OUTPATIENT)
Dept: THORACIC SURGERY | Facility: CLINIC | Age: 54
End: 2019-09-09
Payer: COMMERCIAL

## 2019-09-09 ENCOUNTER — APPOINTMENT (OUTPATIENT)
Dept: RADIOLOGY | Facility: HOSPITAL | Age: 54
End: 2019-09-09

## 2019-09-09 ENCOUNTER — OUTPATIENT (OUTPATIENT)
Dept: OUTPATIENT SERVICES | Facility: HOSPITAL | Age: 54
LOS: 1 days | End: 2019-09-09
Payer: COMMERCIAL

## 2019-09-09 VITALS
SYSTOLIC BLOOD PRESSURE: 135 MMHG | TEMPERATURE: 97.7 F | BODY MASS INDEX: 29.06 KG/M2 | DIASTOLIC BLOOD PRESSURE: 88 MMHG | HEIGHT: 63 IN | RESPIRATION RATE: 16 BRPM | WEIGHT: 164 LBS | HEART RATE: 95 BPM | OXYGEN SATURATION: 99 %

## 2019-09-09 DIAGNOSIS — Z98.89 OTHER SPECIFIED POSTPROCEDURAL STATES: Chronic | ICD-10-CM

## 2019-09-09 DIAGNOSIS — R93.89 ABNORMAL FINDINGS ON DIAGNOSTIC IMAGING OF OTHER SPECIFIED BODY STRUCTURES: ICD-10-CM

## 2019-09-09 DIAGNOSIS — M71.30 OTHER BURSAL CYST, UNSPECIFIED SITE: Chronic | ICD-10-CM

## 2019-09-09 DIAGNOSIS — J84.89 OTHER SPECIFIED INTERSTITIAL PULMONARY DISEASES: ICD-10-CM

## 2019-09-09 DIAGNOSIS — Z98.82 BREAST IMPLANT STATUS: Chronic | ICD-10-CM

## 2019-09-09 DIAGNOSIS — Z90.10 ACQUIRED ABSENCE OF UNSPECIFIED BREAST AND NIPPLE: Chronic | ICD-10-CM

## 2019-09-09 DIAGNOSIS — Z98.1 ARTHRODESIS STATUS: Chronic | ICD-10-CM

## 2019-09-09 PROCEDURE — 99212 OFFICE O/P EST SF 10 MIN: CPT

## 2019-09-09 PROCEDURE — 71046 X-RAY EXAM CHEST 2 VIEWS: CPT | Mod: 26

## 2019-09-13 ENCOUNTER — APPOINTMENT (OUTPATIENT)
Dept: PULMONOLOGY | Facility: CLINIC | Age: 54
End: 2019-09-13

## 2019-09-13 ENCOUNTER — APPOINTMENT (OUTPATIENT)
Dept: PULMONOLOGY | Facility: CLINIC | Age: 54
End: 2019-09-13
Payer: COMMERCIAL

## 2019-09-13 VITALS
BODY MASS INDEX: 29.59 KG/M2 | WEIGHT: 167 LBS | HEART RATE: 95 BPM | SYSTOLIC BLOOD PRESSURE: 124 MMHG | RESPIRATION RATE: 17 BRPM | DIASTOLIC BLOOD PRESSURE: 70 MMHG | OXYGEN SATURATION: 98 % | HEIGHT: 63 IN

## 2019-09-13 PROCEDURE — 99214 OFFICE O/P EST MOD 30 MIN: CPT

## 2019-09-13 NOTE — ASSESSMENT
[FreeTextEntry1] : 54 year old female with hx of s/p right VATS, right upper lobe wedge resection x3 on 6/28/13. 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 She has started Gammaguard infusions once weekly 5 week ago. She is followed by Dr. Malin. She was recently started on Amitriptyline for neuropathic cough. She was diagnosed with left breast cancer in 2017 s/p b/l mastectomy, chemo, RT. \par \par CT chest scan 8/13/19: Bibasilar reticular opacities are again noted. Numerous new/increased \par nodular opacities; for example: \par --New 5 mm subpleural right middle lobe nodule (series 3 image 80) \par --New nodular opacities at the right base (series 3 image 114) \par --2.6 cm left basilar nodule (series 3 image 106); previously 1.4 cm \par \par CT CHEST 7/1/19:\par *** ADDENDUM 07/10/2019 *** \par An outside examination dated June 8, 2019 was provided for comparison. \par Additional comments as follows: \par The lower lobe nodule seen on the present examination may reflect residual areas of consolidation seen on the prior study. As such, these are likely infectious. Minimal thickening seen along the suture line on the right seen on series 4 image 184 is new since the prior and may reflect focal scarring. Multiple new nodules are identified the largest of which is seen in the left lung base measuring 1.4 cm. Scattered other nodules measure between 0.2 and 0.5 cm and are also new since the previous examination. For example series 3 image 96 and the right lower lobe and series 3 image 100 and the left lower lobe. Minimal bilateral reticular opacities are noted. \par \par The patient reports constant dry cough. She was treated with Avelox in between scans. The patient denies shortness of breath, fever, chills, dysphagia or hemoptysis. \par \par I have reviewed the medical records and images with the patient and made the following recommendations. I have recommended she undergo a Bronchoscopy, Left VATS, wedge resection. The risks, benefits, and alternatives to the procedure were discussed with the patient at length. She verbalized understanding, and is in agreement with the above treatment plan. \par \par Written by Ml Mcqueen NP, acting as a scribe for Robert Huerta MD.\par The documentation recorded by the scribe accurately reflects the service I personally performed and the decisions made by me. Robert Huerta MD\par

## 2019-09-13 NOTE — HISTORY OF PRESENT ILLNESS
[FreeTextEntry1] : Ms. Kunz is a 54 year old female with a history of abnormal chest CT, severe persistent asthma, BOOP, cough, eosinophilic asthma/PNA, GERD, lung nodules, PND, and SOB presenting to the office today for a follow up visit. Her chief complaint is BOOP.\par -she is s/p surgery with Dr. Huerta for a left sided VATS procedure\par -she reports feeling generally well\par -she reports her reflux is controlled\par -she reports she is eating well\par -she notes her breathing has been stable.  She had been using Xopenex before the surgery only to keep the airways open for the surgery, and hasn't been using it since that point\par -she notes her sinuses are clear\par -she reports the Amitriptyline has resolved her cough completely\par -she notes she will be having a CT scan in December\par -she denies any chest pain, chest pressure, diarrhea, constipation, dysphagia, dizziness, sour taste in the mouth, leg swelling, leg pain, itchy eyes, itchy ears, heartburn, reflux

## 2019-09-13 NOTE — ASSESSMENT
[FreeTextEntry1] : Ms. Kunz is a 53 year old female with a history of chronic sinus disease, GERD, Hashimoto's thyroiditis, BOOP, IgG deficiency, recent iron deficient anemia, asthma (eosinophilic) coming in for pulmonary reevaluation. She is s/p mastectomy and s/p hospitalization for "expander" Staph infection. She is now recovered and s/p Taxol, Herceptin and Perjetta for her breast cancer. She is now s/p radiation therapy, and presents to the office today with improved cough c/w asthma/allergies and is here s/p breast surgery - post-op abnormal CT scan (new nodules) and cough \par \par problem 1a: original abnormal chest CT s/p VATS - c/w BOOP\par -BOOP, diagnosed by VATS in 2013 / 2019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 \par -s/p CT in June 2019, next in 12/2019\par \par problem 1b: New abnormal CT (nodules) \par - inflammatory disease c/w BOOP\par \par -CAT scans are the only radiological modality to identify abnormalities w/in the lungs with regards to nodules/masses/lymph nodes. Risks, benefits were reviewed in detail. The guidelines for abnormalities include follow up CT scans at various intervals which could range from 6 weeks to 1 year intervals. If there is a change for the worse then consideration for a biopsy will be considered if you are a candidate. Second opinion evaluation with a thoracic surgeon or an interventional radiologist could be offered. \par \par problem 2: BOOP - confirmed\par -diagnosed by right VATs \par -follow up chest CT in 12/2019\par -s/p Zithromax rx past (reluctant to initiate any Rx)\par \par problem 3a: severe persistent asthma -(improved)\par -continue Bevespi 2 puffs\par -continue Asmanex 2 puffs BID\par -continue Singulair 10mg QHS\par -continue levalbuterol 0.31 by the nebulizer up to QID\par -followed by Atrovent by the nebulizer up to QID\par -followed by Pulmicort 0.5 by the nebulizer BID \par -Asthma is  believed to be caused by inherited (genetic) and environmental factor, but its exact cause is unknown. Asthma may be triggered by allergens, lung infections, or irritants in the air. Asthma triggers are different for each person\par -Inhaler technique reviewed as well as oral hygiene techniques reviewed with patient. Avoidance of cold air, extremes of temperature, rescue inhaler should be used before exercise. Order of medication reviewed with patient. Recommended use of a cool mist humidifier in the bedroom. \par \par problem 3b: eosinophilic  asthma\par -off Nucala ; Consider Fasenra \par -The safety and efficacy of Nucala was established in three double-blind, randomized, placebo-controlled trials in patients with severe asthma. Compared to a placebo, patients with severe asthma receiving Nucala had fewer exacerbation requiring hospitalization and/or emergency department visits, and a longer time to first exacerbation.  In addition, patients with severe asthma receiving Nucala experienced greater reductions in their daily maintenance oral corticosteroid dose, while maintaining asthma control compared with patients receiving placebo. Treatment with Nucala did not result in a significant improvement in lung function, as measured by the volume of air exhaled by patients in one second. The most common side effects include: headache, injection site reactions, back pain, weakness, and fatigue; hypersensitivity reactions can occur within hours or days including swelling of the face, mouth, and tongue, fainting, dizziness, hives, breathing problems, and rash; herpes zoster infections have occurred. The drug is a monoclonal antibody that inhibits interleukin -5 which helps regular eosinophils, a type of white blood cell that contributes to asthma. The over-production of eosinophils can cause inflammation in the lungs, increasing the frequency of asthma attacks. Patients must also take other medications, including high dose inhaled corticosteroids and at least one additional asthma drug. \par \par problem 4: allergic rhinitis\par -recommended Xlear or nasal saline, Navage.\par -recommended Sinugator \par -Environmental measures for allergies were encouraged including mattress and pillow cover, air purifier, and environmental controls. \par \par problem 5: GERD/LPR\par - Zantac 300 mg QHS \par - Recommended Apple cider vineger tablets. \par -Rule of 2s: avoid eating too much, eating too late, eating too spicy, eating two hours before bed\par -Things to avoid including overeating, spicy foods, tight clothing, eating within three hours of bed, this list is not all inclusive. \par -For treatment of reflux, possible options discussed including diet control, H2 blockers, PPIs, as well as coating motility agents discussed as treatment options. Timing of meals and proximity of last meal to sleep were discussed. If symptoms persist, a formal gastrointestinal evaluation is needed. \par \par problem 6: sensory neuropathic cough (controlled)\par -continue Amitriptyline 10 mg up to TID, QHS \par -Sensory neuropathic cough is an etiology of cough that is often realized once someone has been ruled out for common disease such as: asthma, COPD, eosinophilic bronchitis, bronchiectasis, post nasal drip, and GERD. It sometimes develops following a URI, herpes zoster outbreak in pharynx or thyroid or cervical spine injury. However, many patients have no identifiable antecedent explanation. \par \par problem 7: r/o diaphragm dysfunction\par -fluoroscopy of the diaphragm was all normal\par \par problem 8: low immunity \par -continue to use Gammagard weekly \par -recommended early intervention  \par -s/p Prevnar 13 vaccine \par \par problem 9: health maintenance\par -s/p 2018 flu shot\par -s/p 1st Shingrix vaccine, s/p 2nd shot\par -recommended strep pneumonia vaccines: Prevnar-13 vaccine, followed by Pneumo vaccine 23 on year following\par -recommended early intervention for URIs\par -recommended osteoporosis evaluations\par -recommended early dermatological evaluations\par -recommended after the age of 50 to the age of 70, colonoscopy every 5 years\par \par F/U in 3 months with SPI and NiOx\par She is encouraged to call with any changes, concerns, or questions

## 2019-09-13 NOTE — REVIEW OF SYSTEMS
[Negative] : Sleep Disorder [As Noted in HPI] : as noted in HPI [Nasal Congestion] : no nasal congestion [Sinus Problems] : no sinus problems [Cough] : no cough [Chest Discomfort] : no chest discomfort [Dyspnea] : no dyspnea [Itchy Eyes] : no itching of ~T the eyes [Heartburn] : no heartburn [Reflux] : no reflux [Dysphagia] : no dysphagia [Constipation] : no constipation [Diarrhea] : no diarrhea

## 2019-09-13 NOTE — HISTORY OF PRESENT ILLNESS
[FreeTextEntry1] : 54 year old female with hx of s/p right VATS, right upper lobe wedge resection x3 on 6/28/13. 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 She has started Gammaguard infusions once weekly 5 week ago. She is followed by Dr. Malin. She was recently started on Amitriptyline for neuropathic cough. She was diagnosed with left breast cancer in 2017 s/p b/l mastectomy, chemo, RT. \par \par CT chest scan 8/13/19: Bibasilar reticular opacities are again noted. Numerous new/increased \par nodular opacities; for example: \par --New 5 mm subpleural right middle lobe nodule (series 3 image 80) \par --New nodular opacities at the right base (series 3 image 114) \par --2.6 cm left basilar nodule (series 3 image 106); previously 1.4 cm \par \par CT CHEST 7/1/19:\par *** ADDENDUM 07/10/2019 *** \par An outside examination dated June 8, 2019 was provided for comparison. \par Additional comments as follows: \par The lower lobe nodule seen on the present examination may reflect residual areas of consolidation seen on the prior study. As such, these are likely infectious. Minimal thickening seen along the suture line on the right seen on series 4 image 184 is new since the prior and may reflect focal scarring. Multiple new nodules are identified the largest of which is seen in the left lung base measuring 1.4 cm. Scattered other \par nodules measure between 0.2 and 0.5 cm and are also new since the previous examination. For example series 3 image 96 and the right lower lobe and series 3 image 100 and the left lower lobe. Minimal bilateral reticular opacities are noted. \par \par The patient reports constant dry cough. She was treated with Avelox in between scans. The patient denies shortness of breath, fever, chills, dysphagia or hemoptysis. \par

## 2019-09-13 NOTE — PHYSICAL EXAM
[General Appearance - Well Developed] : well developed [Normal Appearance] : normal appearance [General Appearance - Well Nourished] : well nourished [Well Groomed] : well groomed [No Deformities] : no deformities [General Appearance - In No Acute Distress] : no acute distress [Eyelids - No Xanthelasma] : the eyelids demonstrated no xanthelasmas [Normal Conjunctiva] : the conjunctiva exhibited no abnormalities [II] : II [Normal Oropharynx] : normal oropharynx [Neck Appearance] : the appearance of the neck was normal [Neck Cervical Mass (___cm)] : no neck mass was observed [Jugular Venous Distention Increased] : there was no jugular-venous distention [Thyroid Diffuse Enlargement] : the thyroid was not enlarged [Thyroid Nodule] : there were no palpable thyroid nodules [Heart Rate And Rhythm] : heart rate and rhythm were normal [Heart Sounds] : normal S1 and S2 [Respiration, Rhythm And Depth] : normal respiratory rhythm and effort [Murmurs] : no murmurs present [Auscultation Breath Sounds / Voice Sounds] : lungs were clear to auscultation bilaterally [Exaggerated Use Of Accessory Muscles For Inspiration] : no accessory muscle use [Abdomen Soft] : soft [Abdomen Tenderness] : non-tender [Abdomen Mass (___ Cm)] : no abdominal mass palpated [Abnormal Walk] : normal gait [Nail Clubbing] : no clubbing of the fingernails [Gait - Sufficient For Exercise Testing] : the gait was sufficient for exercise testing [Cyanosis, Localized] : no localized cyanosis [Petechial Hemorrhages (___cm)] : no petechial hemorrhages [Skin Color & Pigmentation] : normal skin color and pigmentation [] : no rash [Skin Lesions] : no skin lesions [No Venous Stasis] : no venous stasis [No Skin Ulcers] : no skin ulcer [No Xanthoma] : no  xanthoma was observed [No Focal Deficits] : no focal deficits [Sensation] : the sensory exam was normal to light touch and pinprick [Deep Tendon Reflexes (DTR)] : deep tendon reflexes were 2+ and symmetric [Oriented To Time, Place, And Person] : oriented to person, place, and time [Impaired Insight] : insight and judgment were intact [Affect] : the affect was normal [FreeTextEntry1] : I:E ratio 1:3; clear

## 2019-09-13 NOTE — ADDENDUM
[FreeTextEntry1] : Documented by Dorian Schaefer acting as a scribe for Dr. Munir Malin on 09/13/2019.\par \par All medical record entries made by the Scribe were at my, Dr. Munir Malin's, direction and personally dictated by me on 09/13/2019. I have reviewed the chart and agree that the record accurately reflects my personal performance of the history, physical exam, assessment and plan. I have also personally directed, reviewed, and agree with the discharge instructions.

## 2019-09-23 ENCOUNTER — APPOINTMENT (OUTPATIENT)
Dept: OBGYN | Facility: CLINIC | Age: 54
End: 2019-09-23
Payer: COMMERCIAL

## 2019-09-23 VITALS
RESPIRATION RATE: 16 BRPM | SYSTOLIC BLOOD PRESSURE: 128 MMHG | DIASTOLIC BLOOD PRESSURE: 70 MMHG | BODY MASS INDEX: 29.41 KG/M2 | WEIGHT: 166 LBS | HEART RATE: 82 BPM | OXYGEN SATURATION: 99 %

## 2019-09-23 DIAGNOSIS — C50.912 MALIGNANT NEOPLASM OF UNSPECIFIED SITE OF LEFT FEMALE BREAST: ICD-10-CM

## 2019-09-23 DIAGNOSIS — Z86.19 PERSONAL HISTORY OF OTHER INFECTIOUS AND PARASITIC DISEASES: ICD-10-CM

## 2019-09-23 PROCEDURE — 99396 PREV VISIT EST AGE 40-64: CPT

## 2019-09-23 NOTE — CHIEF COMPLAINT
[Annual Visit] : annual visit [FreeTextEntry1] : Check up S/P lung nodule biopsies with Dr Norris Path benign Breast reconstruction pending

## 2019-09-23 NOTE — COUNSELING
[Exercise] : exercise [Nutrition] : nutrition [Breast Self Exam] : breast self exam [Vitamins/Supplements] : vitamins/supplements

## 2019-09-23 NOTE — REVIEW OF SYSTEMS
[Fever] : no fever [Feeling Tired] : not feeling tired [Chills] : no chills [Recent Wt Gain ___ Lbs] : no recent weight gain [Pain] : no pain [Redness] : no redness [Discharge] : no discharge [Sight Problems] : no sight problems [Nosebleeds] : no nosebleeds [Dec Hearing] : no hearing loss [Sore Throat] : no sore throat [Nasal Discharge] : no nasal discharge [Heart Rate Is Fast] : the heart rate was not fast [Chest Pain] : no chest pain [Palpitations] : no palpitations [Lower Ext Edema] : no lower extremity edema [Dyspnea] : no shortness of breath [Wheezing] : no wheezing [Cough] : no cough [SOB on Exertion] : no shortness of breath during exertion [Vomiting] : no vomiting [Abdominal Pain] : no abdominal pain [Constipation] : no constipation [Diarrhea] : no diarrhea [Heartburn] : no heartburn [Melena] : no melena [Dysuria] : no dysuria [Pelvic Pain] : no pelvic pain [Incontinence] : no incontinence [Abn Vag Bleeding] : no abnormal vaginal bleeding [Frequency] : no frequency [Urgency] : no urgency [Urethral Discharge] : no abnormal urethral discharge [Arthralgias] : no arthralgias [Joint Swelling] : no joint swelling [Joint Pain] : no joint pain [Joint Stiffness] : no joint stiffness [Change In A Mole] : no change in a mole [Skin Lesions] : no skin lesions [Breast Pain] : no breast pain [Breast Lump] : no breast lump [Convulsions] : no convulsions [Dizziness] : no dizziness [Headache] : no headache [Fainting] : no fainting [Sleep Disturbances] : no sleep disturbances [Anxiety] : no anxiety [Depression] : no depression [Muscle Weakness] : no muscle weakness [Hot Flashes] : no hot flashes [Deepening Voice] : no deepening of the voice [Easy Bleeding] : does not bleed easily [Swollen Glands] : no swollen glands [Easy Bruising] : does not bruise easily [Feeling Weak] : no feelings of weakness [Swollen Glands In The Neck] : no swollen glands in the neck

## 2019-09-23 NOTE — HISTORY OF PRESENT ILLNESS
[Hot Flashes] : no hot flashes [Vaginal Itching] : no vaginal itching [Night Sweats] : no night sweats [Dyspareunia] : no dyspareunia [Mood Changes] : no mood changes [Headache] : no headache [Fatigue] : no fatigue [Weight Change] : no weight change [Irritability] : no irritability [Forgetfulness] : no forgetfulness [Depression] : no depression [Loss of Libido] : no loss of libido [Definite:  ___ (Date)] : the last menstrual period was [unfilled] [Anxiety] : no anxiety [Normal Amount/Duration] : was of a normal amount and duration [Regular Cycle Intervals] : periods have been regular [Menarche Age: ____] : age at menarche was [unfilled] [Menstrual Cramps] : no menstrual cramps [Menopause Age: ____] : age at menopause was [unfilled] [Monogamous] : is monogamous [Sexually Active] : is sexually active [Male ___] : [unfilled] male

## 2019-09-23 NOTE — PHYSICAL EXAM
[Awake] : awake [Alert] : alert [LAD] : no lymphadenopathy [Acute Distress] : no acute distress [Goiter] : no goiter [Thyroid Nodule] : no thyroid nodule [Mass] : no breast mass [Nipple Discharge] : no nipple discharge [Axillary LAD] : no axillary lymphadenopathy [Right Breast Absent] : a total mastectomy [Breast Reconstruction Right] : breast reconstruction [Left Breast Absent] : a total mastectomy [Breast Reconstruction Left] : breast reconstruction [Axillary Lymph Nodes Enlarged Bilaterally] : no enlarged nodes [No Masses] : no breast masses were palpable [Tender] : non tender [Distended] : not distended [H/Smegaly] : no hepatosplenomegaly [Depressed Mood] : not depressed [Oriented x3] : oriented to person, place, and time [Flat Affect] : affect not flat [Vulvar Atrophy] : no vulvar atrophy [No Lesions] : no genitalia lesions [Vulvitis] : no vulvitis [Labia Majora Erythema] : no erythema of the labia majora [Labia Minora Erythema] : no erythema of the labia minora [Labia Majora] : labia major [Labia Minora] : labia minora [Normal] : clitoris [Erythema] : no erythema [Atrophy] : no atrophy [Rectocele] : no rectocele [Cystocele] : no cystocele [Uterine Prolapse] : no uterine prolapse [Dry Mucosa] : moist mucosa [Discharge] : had no discharge [No Bleeding] : there was no active vaginal bleeding [Erosion] : had no erosions [Pap Obtained] : a Pap smear was performed [Soft] :  the cervix was soft [Motion Tenderness] : there was no cervical motion tenderness [Tenderness] : nontender [Enlarged ___ wks] : not enlarged [Mass ___ cm] : no uterine mass was palpated [Adnexa Tenderness] : were not tender [Uterine Adnexae] : were not tender and not enlarged [Ovarian Mass (___ Cm)] : there were no adnexal masses

## 2019-09-27 LAB
CYTOLOGY CVX/VAG DOC THIN PREP: ABNORMAL
HPV HIGH+LOW RISK DNA PNL CVX: NOT DETECTED

## 2019-10-02 LAB — FUNGUS SPEC QL CULT: SIGNIFICANT CHANGE UP

## 2019-10-11 LAB — ACID FAST STN SPEC: SIGNIFICANT CHANGE UP

## 2019-11-19 ENCOUNTER — RX RENEWAL (OUTPATIENT)
Age: 54
End: 2019-11-19

## 2019-11-20 ENCOUNTER — RX RENEWAL (OUTPATIENT)
Age: 54
End: 2019-11-20

## 2019-11-22 ENCOUNTER — FORM ENCOUNTER (OUTPATIENT)
Age: 54
End: 2019-11-22

## 2019-11-23 ENCOUNTER — APPOINTMENT (OUTPATIENT)
Dept: CT IMAGING | Facility: CLINIC | Age: 54
End: 2019-11-23
Payer: COMMERCIAL

## 2019-11-23 ENCOUNTER — OUTPATIENT (OUTPATIENT)
Dept: OUTPATIENT SERVICES | Facility: HOSPITAL | Age: 54
LOS: 1 days | End: 2019-11-23
Payer: COMMERCIAL

## 2019-11-23 DIAGNOSIS — Z98.89 OTHER SPECIFIED POSTPROCEDURAL STATES: Chronic | ICD-10-CM

## 2019-11-23 DIAGNOSIS — Z90.10 ACQUIRED ABSENCE OF UNSPECIFIED BREAST AND NIPPLE: Chronic | ICD-10-CM

## 2019-11-23 DIAGNOSIS — Z98.82 BREAST IMPLANT STATUS: Chronic | ICD-10-CM

## 2019-11-23 DIAGNOSIS — M71.30 OTHER BURSAL CYST, UNSPECIFIED SITE: Chronic | ICD-10-CM

## 2019-11-23 DIAGNOSIS — R91.8 OTHER NONSPECIFIC ABNORMAL FINDING OF LUNG FIELD: ICD-10-CM

## 2019-11-23 DIAGNOSIS — Z98.1 ARTHRODESIS STATUS: Chronic | ICD-10-CM

## 2019-11-23 PROCEDURE — 71250 CT THORAX DX C-: CPT | Mod: 26

## 2019-11-23 PROCEDURE — 71250 CT THORAX DX C-: CPT

## 2019-11-27 ENCOUNTER — RX RENEWAL (OUTPATIENT)
Age: 54
End: 2019-11-27

## 2019-12-05 ENCOUNTER — RX RENEWAL (OUTPATIENT)
Age: 54
End: 2019-12-05

## 2019-12-10 ENCOUNTER — APPOINTMENT (OUTPATIENT)
Dept: PULMONOLOGY | Facility: CLINIC | Age: 54
End: 2019-12-10
Payer: COMMERCIAL

## 2019-12-10 VITALS
HEIGHT: 63 IN | HEART RATE: 92 BPM | SYSTOLIC BLOOD PRESSURE: 122 MMHG | RESPIRATION RATE: 16 BRPM | DIASTOLIC BLOOD PRESSURE: 80 MMHG | BODY MASS INDEX: 30.3 KG/M2 | OXYGEN SATURATION: 97 % | WEIGHT: 171 LBS

## 2019-12-10 PROCEDURE — 99214 OFFICE O/P EST MOD 30 MIN: CPT

## 2019-12-16 ENCOUNTER — APPOINTMENT (OUTPATIENT)
Dept: THORACIC SURGERY | Facility: CLINIC | Age: 54
End: 2019-12-16
Payer: COMMERCIAL

## 2019-12-16 VITALS
BODY MASS INDEX: 29.58 KG/M2 | OXYGEN SATURATION: 98 % | DIASTOLIC BLOOD PRESSURE: 91 MMHG | WEIGHT: 167 LBS | HEART RATE: 92 BPM | RESPIRATION RATE: 16 BRPM | TEMPERATURE: 97.6 F | SYSTOLIC BLOOD PRESSURE: 143 MMHG

## 2019-12-16 PROCEDURE — 99214 OFFICE O/P EST MOD 30 MIN: CPT

## 2019-12-16 RX ORDER — ZOSTER VACCINE RECOMBINANT, ADJUVANTED 50 MCG/0.5
50 KIT INTRAMUSCULAR
Qty: 2 | Refills: 0 | Status: COMPLETED | COMMUNITY
Start: 2018-09-13 | End: 2019-12-16

## 2019-12-16 RX ORDER — MOXIFLOXACIN HYDROCHLORIDE TABLETS, 400 MG 400 MG/1
400 TABLET, FILM COATED ORAL DAILY
Qty: 8 | Refills: 0 | Status: COMPLETED | COMMUNITY
Start: 2019-07-03 | End: 2019-12-16

## 2019-12-16 RX ORDER — HYDROCODONE BITARTRATE AND HOMATROPINE METHYLBROMIDE 5; 1.5 MG/5ML; MG/5ML
5-1.5 SOLUTION ORAL
Qty: 240 | Refills: 0 | Status: COMPLETED | COMMUNITY
Start: 2019-03-26 | End: 2019-12-16

## 2019-12-16 RX ORDER — IPRATROPIUM BROMIDE 0.5 MG/2.5ML
0.02 SOLUTION RESPIRATORY (INHALATION)
Qty: 120 | Refills: 5 | Status: COMPLETED | COMMUNITY
Start: 2018-07-11 | End: 2019-12-16

## 2019-12-16 RX ORDER — PREDNISONE 10 MG/1
10 TABLET ORAL
Qty: 30 | Refills: 0 | Status: COMPLETED | COMMUNITY
Start: 2019-06-13 | End: 2019-12-16

## 2019-12-16 RX ORDER — BUDESONIDE 0.5 MG/2ML
0.5 INHALANT ORAL TWICE DAILY
Qty: 60 | Refills: 3 | Status: COMPLETED | COMMUNITY
Start: 2018-07-11 | End: 2019-12-16

## 2019-12-16 RX ORDER — BUDESONIDE 0.5 MG/2ML
0.5 INHALANT ORAL TWICE DAILY
Qty: 60 | Refills: 5 | Status: COMPLETED | COMMUNITY
Start: 2019-02-11 | End: 2019-12-16

## 2019-12-16 RX ORDER — MONTELUKAST 10 MG/1
10 TABLET, FILM COATED ORAL
Qty: 1 | Refills: 1 | Status: COMPLETED | COMMUNITY
Start: 2019-05-03 | End: 2019-12-16

## 2019-12-16 RX ORDER — LEVALBUTEROL HYDROCHLORIDE 0.31 MG/3ML
0.31 SOLUTION RESPIRATORY (INHALATION) EVERY 6 HOURS
Qty: 120 | Refills: 5 | Status: COMPLETED | COMMUNITY
Start: 2019-02-11 | End: 2019-12-16

## 2019-12-16 RX ORDER — HYDROCODONE BITARTRATE AND HOMATROPINE METHYLBROMIDE 5; 1.5 MG/5ML; MG/5ML
5-1.5 SOLUTION ORAL
Qty: 1 | Refills: 0 | Status: COMPLETED | COMMUNITY
Start: 2019-06-13 | End: 2019-12-16

## 2019-12-16 RX ORDER — GLYCOPYRROLATE AND FORMOTEROL FUMARATE 9; 4.8 UG/1; UG/1
9-4.8 AEROSOL, METERED RESPIRATORY (INHALATION)
Qty: 3 | Refills: 0 | Status: COMPLETED | COMMUNITY
Start: 2019-05-03 | End: 2019-12-16

## 2019-12-16 RX ORDER — RANITIDINE HYDROCHLORIDE 300 MG/1
300 CAPSULE ORAL
Qty: 1 | Refills: 1 | Status: COMPLETED | COMMUNITY
Start: 2018-06-11 | End: 2019-12-16

## 2019-12-16 RX ORDER — CLARITHROMYCIN 500 MG/1
500 TABLET, FILM COATED ORAL
Qty: 60 | Refills: 2 | Status: COMPLETED | COMMUNITY
Start: 2019-09-13 | End: 2019-12-16

## 2019-12-16 NOTE — HISTORY OF PRESENT ILLNESS
[FreeTextEntry1] : Ms. Kunz is a 54 year old female with a history of abnormal chest CT, severe persistent asthma, BOOP, cough, eosinophilic asthma/PNA, GERD, lung nodules, PND, and SOB presenting to the office today for an acute visit for cough.\par \par Pt reports that she has planned breast surgery on Jan 20th and has preop exam on Jan 8th.\par She states that she had been on Biaxin until 12/1 for her BOOP.  She has noticed since discontinuation, her cough has progressively worsened. She cannot get through a conversation without coughing, she cannot finish a sentence with out cough fit. Her daughter is a PA and she states that her daughter heard some crackling on the R>L lung. She also notes a sore like sensation on her Left side.  Her cough is tight and dry.  She does not cough at night, \par She does not feel that promethazine is helping at all. She cannot tolerate the amitriptyline as it causes her to be extremely groggy. \par The biaxin she was on was causing her alof of stomach issues/GI side effects. It was tough to tolerate. \par She had been on zithromax before with another pulmonologist prophylactically and was unsure she saw much benefit. \par \par She completed a CT scan recently. \par She does not feel "sick." She denies fever, chills, SOB, Wheeze. She is no longer on zantac for her GERD, she would like a replacement.

## 2019-12-16 NOTE — PHYSICAL EXAM
[General Appearance - Well Developed] : well developed [Well Groomed] : well groomed [Normal Appearance] : normal appearance [No Deformities] : no deformities [General Appearance - Well Nourished] : well nourished [General Appearance - In No Acute Distress] : no acute distress [Normal Conjunctiva] : the conjunctiva exhibited no abnormalities [Eyelids - No Xanthelasma] : the eyelids demonstrated no xanthelasmas [Normal Oropharynx] : normal oropharynx [II] : II [Neck Appearance] : the appearance of the neck was normal [Neck Cervical Mass (___cm)] : no neck mass was observed [Jugular Venous Distention Increased] : there was no jugular-venous distention [Thyroid Diffuse Enlargement] : the thyroid was not enlarged [Heart Rate And Rhythm] : heart rate and rhythm were normal [Thyroid Nodule] : there were no palpable thyroid nodules [Heart Sounds] : normal S1 and S2 [Murmurs] : no murmurs present [Respiration, Rhythm And Depth] : normal respiratory rhythm and effort [FreeTextEntry1] : I:E ratio 1:3; BL forced expiratory wheezes- faint through out [Exaggerated Use Of Accessory Muscles For Inspiration] : no accessory muscle use [Abdomen Soft] : soft [Abdomen Tenderness] : non-tender [Abdomen Mass (___ Cm)] : no abdominal mass palpated [Gait - Sufficient For Exercise Testing] : the gait was sufficient for exercise testing [Abnormal Walk] : normal gait [Nail Clubbing] : no clubbing of the fingernails [Cyanosis, Localized] : no localized cyanosis [Petechial Hemorrhages (___cm)] : no petechial hemorrhages [Skin Color & Pigmentation] : normal skin color and pigmentation [] : no rash [No Venous Stasis] : no venous stasis [Skin Lesions] : no skin lesions [No Skin Ulcers] : no skin ulcer [No Xanthoma] : no  xanthoma was observed [Deep Tendon Reflexes (DTR)] : deep tendon reflexes were 2+ and symmetric [Sensation] : the sensory exam was normal to light touch and pinprick [No Focal Deficits] : no focal deficits [Impaired Insight] : insight and judgment were intact [Oriented To Time, Place, And Person] : oriented to person, place, and time [Affect] : the affect was normal

## 2019-12-16 NOTE — REASON FOR VISIT
[Asthma] : asthma [Follow-Up] : a follow-up visit [Cough] : cough [Wheezing] : wheezing [FreeTextEntry1] : abnormal chest CT, severe persistent asthma, BOOP, cough, eosinophilic asthma/PNA, GERD, lung nodules, PND, and SOB

## 2019-12-16 NOTE — ASSESSMENT
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 \par \par problem 1b: New abnormal CT (nodules) \par - inflammatory disease c/w BOOP\par -CT scan March 2020\par \par problem 2: BOOP - confirmed\par -diagnosed by right VATs \par -follow up chest CT in 3/20\par -add zithromax 500 mg PO x 5 days then 250 mg MWF moving forward\par \par problem 3a: severe persistent asthma -(improved)\par -continue Bevespi 2 puffs\par -continue Asmanex 2 puffs BID\par -continue Singulair 10mg QHS\par -restart levalbuterol 0.31 by the nebulizer up to QID (prior to inhalers)\par -restart Atrovent by the nebulizer up to QID\par - Pulmicort 0.5 by the nebulizer BID rinse and gargle\par (order to be levalbuterol, budesonide, bevespi then asmanex)\par \par problem 3b: eosinophilic  asthma\par -off Nucala ; Consider Fasenra \par \par problem 4: allergic rhinitis\par -recommended Xlear or nasal saline, Navage.\par -recommended Sinugator \par -Environmental measures for allergies were encouraged including mattress and pillow cover, air purifier, and environmental controls. \par \par problem 5: GERD/LPR\par - add famotidine 40 mg QHS \par - Recommended Apple cider vineger tablets. \par -Rule of 2s: avoid eating too much, eating too late, eating too spicy, eating two hours before bed\par -Things to avoid including overeating, spicy foods, tight clothing, eating within three hours of bed, this list is not all inclusive. \par \par problem 6: sensory neuropathic cough \par -continue Amitriptyline 10 mg up to TID, QHS (as tolerated- take on days off from work)\par -add hycodan cough syrup 10ml QHS\par -failed hycodan and perles\par -Sensory neuropathic cough is an etiology of cough that is often realized once someone has been ruled out for common disease such as: asthma, COPD, eosinophilic bronchitis, bronchiectasis, post nasal drip, and GERD. It sometimes develops following a URI, herpes zoster outbreak in pharynx or thyroid or cervical spine injury. However, many patients have no identifiable antecedent explanation. \par \par problem 7: low immunity \par -continue to use Gammagard weekly \par -recommended early intervention  \par -s/p Prevnar 13 vaccine \par \par F/U in 3 months with SPI and NiOx\par She is encouraged to call with any changes, concerns, or questions

## 2019-12-16 NOTE — PHYSICAL EXAM
[Auscultation Breath Sounds / Voice Sounds] : lungs were clear to auscultation bilaterally [Heart Rate And Rhythm] : heart rate was normal and rhythm regular [Heart Sounds] : normal S1 and S2 [Murmurs] : no murmurs [Heart Sounds Gallop] : no gallops [Heart Sounds Pericardial Friction Rub] : no pericardial rub [Chest Visual Inspection Thoracic Asymmetry] : no chest asymmetry [Examination Of The Chest] : the chest was normal in appearance [Abdomen Soft] : soft [Bowel Sounds] : normal bowel sounds [Diminished Respiratory Excursion] : normal chest expansion [Abdomen Tenderness] : non-tender [Abdomen Mass (___ Cm)] : no abdominal mass palpated [Abnormal Walk] : normal gait [Nail Clubbing] : no clubbing  or cyanosis of the fingernails [Musculoskeletal - Swelling] : no joint swelling seen [Motor Tone] : muscle strength and tone were normal [] : no rash [Skin Color & Pigmentation] : normal skin color and pigmentation [Skin Turgor] : normal skin turgor [Deep Tendon Reflexes (DTR)] : deep tendon reflexes were 2+ and symmetric [Sensation] : the sensory exam was normal to light touch and pinprick [No Focal Deficits] : no focal deficits [Affect] : the affect was normal [Oriented To Time, Place, And Person] : oriented to person, place, and time [Impaired Insight] : insight and judgment were intact

## 2019-12-16 NOTE — REVIEW OF SYSTEMS
[Sinus Problems] : no sinus problems [Nasal Congestion] : no nasal congestion [Cough] : cough [Sputum] : not coughing up ~M sputum [Dyspnea] : no dyspnea [Chest Tightness] : chest tightness [Wheezing] : wheezing [Chest Discomfort] : no chest discomfort [As Noted in HPI] : as noted in HPI [Itchy Eyes] : no itching of ~T the eyes [Heartburn] : no heartburn [Reflux] : no reflux [Dysphagia] : no dysphagia [Constipation] : no constipation [Nausea] : nausea [Diarrhea] : diarrhea [Abdominal Pain] : abdominal pain [FreeTextEntry7] : w/biaxin use [Negative] : Sleep Disorder

## 2019-12-19 NOTE — HISTORY OF PRESENT ILLNESS
[FreeTextEntry1] : 54 year old female s/p Left VATS , Left lower lobe wedge resection on 19. Post op course was unremarkable. Pathology reveals localized organizing pneumonia with lymphoid hyperplasia. \par \par She has a history of Right VATS, right upper lobe wedge resection x3 on 13. 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 She has started Gammaguard infusions once weekly 5 week ago. She is followed by Dr. Malin. She was recently started on Amitriptyline for neuropathic cough. She was diagnosed with left breast cancer in 2017 s/p b/l mastectomy, chemo, RT. \par \par Her medical history also includes Endometrial hyperplasia, GERD, Leukopenia and lung nodules.\par \par CXR today 19: Bilateral lung postsurgical change. No pneumothorax. Improved left basilar opacities likely subsegmental atelectasis. \par \par CT Chest on 19:\par - there are waxing and waning pulmonary nodules: new scattered pulmonary nodules includin cm RML (series 3 image 82); 1.6 cm RLL (series 3 image 105)\par - stable multiple subcentimeter Rt axillary LN\par \par Pt presents today for follow up. Pt reports doing well, denies SOB, cough or CP. She was seen in Dr. Malin's office and was put on Z-Tima. \par

## 2019-12-19 NOTE — DATA REVIEWED
[FreeTextEntry1] : CT Chest on 19:\par - there are waxing and waning pulmonary nodules: new scattered pulmonary nodules includin cm RML (series 3 image 82); 1.6 cm RLL (series 3 image 105)\par - stable multiple subcentimeter Rt axillary LN

## 2019-12-19 NOTE — CONSULT LETTER
[Courtesy Letter:] : I had the pleasure of seeing your patient, [unfilled], in my office today. [Dear  ___] : Dear  [unfilled], [Please see my note below.] : Please see my note below. [Sincerely,] : Sincerely, [FreeTextEntry3] : Robert Huerta MD\par Attending Surgeon\par Division of Thoracic Surgery\par , [facility] [employer] [facility] \[facility]\""/[facility]\par

## 2019-12-19 NOTE — ASSESSMENT
[FreeTextEntry1] : 54 year old female s/p Left VATS , Left lower lobe wedge resection on 19. Post op course was unremarkable. Pathology reveals localized organizing pneumonia with lymphoid hyperplasia. \par \par She has a history of Right VATS, right upper lobe wedge resection x3 on 13. 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 She has started Gammaguard infusions once weekly 5 week ago. She is followed by Dr. Malin. She was recently started on Amitriptyline for neuropathic cough. She was diagnosed with left breast cancer in 2017 s/p b/l mastectomy, chemo, RT. \par \par Her medical history also includes Endometrial hyperplasia, GERD, Leukopenia and lung nodules.\par \par CT Chest on 19:\par - there are waxing and waning pulmonary nodules: new scattered pulmonary nodules includin cm RML (series 3 image 82); 1.6 cm RLL (series 3 image 105)\par - stable multiple subcentimeter Rt axillary LN\par \par I have reviewed the patient's medical records and diagnostic images at time of this office consultation and have made the following recommendation:\par 1. Continue Z- Tima as prescribed by Dr. Malin's office\par 2. CT scan in March\par \par \par I personally performed the services described in the documentation, reviewed the documentation recorded by the scribe in my presence and it accurately and completely records my words and actions. \par \par I, Iman Tavarez NP, am scribing for and the presence of Dr. Robert Huerta the following sections, HISTORY OF PRESENT ILLNESS, PAST MEDICAL/FAMILY/SOCIAL HISTORY; REVIEW OF SYSTEMS; VITAL SIGNS; PHYSICAL EXAM; DISPOSITION.\par

## 2019-12-24 ENCOUNTER — APPOINTMENT (OUTPATIENT)
Dept: PULMONOLOGY | Facility: CLINIC | Age: 54
End: 2019-12-24
Payer: COMMERCIAL

## 2019-12-24 ENCOUNTER — NON-APPOINTMENT (OUTPATIENT)
Age: 54
End: 2019-12-24

## 2019-12-24 VITALS
OXYGEN SATURATION: 99 % | RESPIRATION RATE: 17 BRPM | HEIGHT: 63 IN | WEIGHT: 167 LBS | DIASTOLIC BLOOD PRESSURE: 80 MMHG | BODY MASS INDEX: 29.59 KG/M2 | SYSTOLIC BLOOD PRESSURE: 112 MMHG | HEART RATE: 70 BPM

## 2019-12-24 DIAGNOSIS — R05 COUGH: ICD-10-CM

## 2019-12-24 PROCEDURE — 99214 OFFICE O/P EST MOD 30 MIN: CPT | Mod: 25

## 2019-12-24 PROCEDURE — 95012 NITRIC OXIDE EXP GAS DETER: CPT

## 2019-12-24 PROCEDURE — 94618 PULMONARY STRESS TESTING: CPT

## 2019-12-24 PROCEDURE — 94010 BREATHING CAPACITY TEST: CPT

## 2019-12-24 RX ORDER — RANITIDINE 300 MG/1
300 TABLET ORAL
Qty: 90 | Refills: 0 | Status: DISCONTINUED | COMMUNITY
Start: 2018-09-13 | End: 2019-12-24

## 2019-12-24 RX ORDER — TIOTROPIUM BROMIDE AND OLODATEROL 3.124; 2.736 UG/1; UG/1
2.5-2.5 SPRAY, METERED RESPIRATORY (INHALATION)
Qty: 1 | Refills: 1 | Status: DISCONTINUED | OUTPATIENT
Start: 2019-03-26 | End: 2019-12-24

## 2019-12-24 RX ORDER — RANITIDINE 300 MG/1
300 TABLET ORAL
Qty: 90 | Refills: 1 | Status: DISCONTINUED | COMMUNITY
Start: 2019-08-13 | End: 2019-12-24

## 2019-12-24 NOTE — PROCEDURE
[FreeTextEntry1] : PFT revealed mild restrictive / obstructive dysfunction, with a FEV1 of 1.99L, which is 76% of predicted, with a normal flow volume loop\par \par FENO was 19; a normal value being less than 25\par Fractional exhaled nitric oxide (FENO) is regarded as a simple, noninvasive method for assessing eosinophilic airway inflammation. Produced by a variety of cells within the lung, nitric oxide (NO) concentrations are generally low in healthy individuals. However, high concentrations of NO appear to be involved in nonspecific host defense mechanisms and chronic inflammatory diseases such as asthma. The American Thoracic Society (ATS) therefore has recommended using FENO to aid in the diagnosis and monitoring of eosinophilic airway inflammation and asthma, and for identifying steroid responsive individuals whose chronic respiratory symptoms may be caused by airway inflammation. \par \par 6 minute walk test reveals a low saturation of 94% with no evidence of dyspnea or fatigue; walked 489 meters \par \par Chest CT (11.23.19) reveals Waxing and waning pulmonary nodules most likely representing organizing pneumonia. \par Recommend follow-up chest CT in 3-6 months in this patient with history of breast cancer.

## 2019-12-24 NOTE — HISTORY OF PRESENT ILLNESS
[FreeTextEntry1] : Ms. Kunz is a 54 year old female with a history of abnormal chest CT, severe persistent asthma, BOOP, cough, eosinophilic asthma/PNA, GERD, lung nodules, PND, and SOB presenting to the office today for a follow up visit. Her chief complaint is cough.\par -she reports feeling improvd, and saw [place] about 2 weeks ago\par -she was placed on Azithromycin, which cleared her inflammation and cough\par -she notes she has been taking Zithromax 250 mg twice per week, though her cough has returned\par -she reports she may have lost a few pounds\par -she notes her cough is nonproductive\par -she notes her cough had been previously causing her to have chest tightness, though her current cough is more of a choking cough\par -she notes she has been using her inhalers regularly\par -she is awaiting surgery at [facility], and will be having surgery 1/20/2020, for 2nd breast reconstruction surgery\par -she is s/p the flu shot, and is s/p the PNA shots a few years ago, and is s/p both shingrix shots\par -she reports she is sleeping well, and denies snoring\par -she reports her sinuses are clear, and uses the nettipot PRN\par -she notes she is still on IVIG infusions weekly\par -she denies any visual issues, SOB, orthopnea, fever, chills, sweats, chest pain, chest pressure, diarrhea, constipation, dysphagia, dizziness, sour taste in the mouth

## 2019-12-24 NOTE — PHYSICAL EXAM
[Well Groomed] : well groomed [Normal Appearance] : normal appearance [General Appearance - Well Developed] : well developed [No Deformities] : no deformities [Normal Conjunctiva] : the conjunctiva exhibited no abnormalities [General Appearance - In No Acute Distress] : no acute distress [General Appearance - Well Nourished] : well nourished [Normal Oropharynx] : normal oropharynx [Eyelids - No Xanthelasma] : the eyelids demonstrated no xanthelasmas [Neck Appearance] : the appearance of the neck was normal [Neck Cervical Mass (___cm)] : no neck mass was observed [II] : II [Thyroid Diffuse Enlargement] : the thyroid was not enlarged [Heart Rate And Rhythm] : heart rate and rhythm were normal [Thyroid Nodule] : there were no palpable thyroid nodules [Jugular Venous Distention Increased] : there was no jugular-venous distention [Murmurs] : no murmurs present [Heart Sounds] : normal S1 and S2 [Respiration, Rhythm And Depth] : normal respiratory rhythm and effort [Auscultation Breath Sounds / Voice Sounds] : lungs were clear to auscultation bilaterally [Exaggerated Use Of Accessory Muscles For Inspiration] : no accessory muscle use [Abdomen Tenderness] : non-tender [Abdomen Soft] : soft [Abdomen Mass (___ Cm)] : no abdominal mass palpated [Gait - Sufficient For Exercise Testing] : the gait was sufficient for exercise testing [Abnormal Walk] : normal gait [Cyanosis, Localized] : no localized cyanosis [Nail Clubbing] : no clubbing of the fingernails [Petechial Hemorrhages (___cm)] : no petechial hemorrhages [Skin Color & Pigmentation] : normal skin color and pigmentation [] : no rash [No Venous Stasis] : no venous stasis [No Xanthoma] : no  xanthoma was observed [No Skin Ulcers] : no skin ulcer [Skin Lesions] : no skin lesions [Deep Tendon Reflexes (DTR)] : deep tendon reflexes were 2+ and symmetric [No Focal Deficits] : no focal deficits [Sensation] : the sensory exam was normal to light touch and pinprick [Impaired Insight] : insight and judgment were intact [Oriented To Time, Place, And Person] : oriented to person, place, and time [Affect] : the affect was normal [FreeTextEntry1] : I:E ratio 1:3; clear

## 2019-12-24 NOTE — ASSESSMENT
[FreeTextEntry1] : Ms. Kunz is a 54 year old female with a history of chronic sinus disease, GERD, Hashimoto's thyroiditis, BOOP, IgG deficiency, recent iron deficient anemia, asthma (eosinophilic) coming in for pulmonary reevaluation. She is s/p mastectomy and s/p hospitalization for "expander" Staph infection. She is now recovered and s/p Taxol, Herceptin and Perjetta for her breast cancer. 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 Risks, benefits were reviewed in detail. The guidelines for abnormalities include follow up CT scans at various intervals which could range from 6 weeks to 1 year intervals. If there is a change for the worse then consideration for a biopsy will be considered if you are a candidate. Second opinion evaluation with a thoracic surgeon or an interventional radiologist could be offered. \par \par problem 2: BOOP - confirmed\par -diagnosed by right VATs \par -follow up chest CT in 6/2020\par -continue Zithromax 500 mg M/W/F (restart)\par \par problem 3a: severe persistent asthma -(improved)\par -continue Bevespi 2 puffs\par -continue Asmanex 2 puffs BID\par -continue Singulair 10mg QHS\par -continue levalbuterol 0.31 by the nebulizer up to QID\par -followed by Atrovent by the nebulizer up to QID\par -followed by Pulmicort 0.5 by the nebulizer BID \par -Asthma is  believed to be caused by inherited (genetic) and environmental factor, but its exact cause is unknown. Asthma may be triggered by allergens, lung infections, or irritants in the air. Asthma triggers are different for each person\par -Inhaler technique reviewed as well as oral hygiene techniques reviewed with patient. Avoidance of cold air, extremes of temperature, rescue inhaler should be used before exercise. Order of medication reviewed with patient. Recommended use of a cool mist humidifier in the bedroom. \par \par problem 3b: eosinophilic  asthma\par -off Nucala ; Consider Fasenra or Dupixent \par -The safety and efficacy of Nucala was established in three double-blind, randomized, placebo-controlled trials in patients with severe asthma. Compared to a placebo, patients with severe asthma receiving Nucala had fewer exacerbation requiring hospitalization and/or emergency department visits, and a longer time to first exacerbation.  In addition, patients with severe asthma receiving Nucala experienced greater reductions in their daily maintenance oral corticosteroid dose, while maintaining asthma control compared with patients receiving placebo. Treatment with Nucala did not result in a significant improvement in lung function, as measured by the volume of air exhaled by patients in one second. The most common side effects include: headache, injection site reactions, back pain, weakness, and fatigue; hypersensitivity reactions can occur within hours or days including swelling of the face, mouth, and tongue, fainting, dizziness, hives, breathing problems, and rash; herpes zoster infections have occurred. The drug is a monoclonal antibody that inhibits interleukin -5 which helps regular eosinophils, a type of white blood cell that contributes to asthma. The over-production of eosinophils can cause inflammation in the lungs, increasing the frequency of asthma attacks. Patients must also take other medications, including high dose inhaled corticosteroids and at least one additional asthma drug. \par \par problem 4: allergic rhinitis\par -recommended Xlear or nasal saline, Navage.\par -recommended Sinugator \par -Environmental measures for allergies were encouraged including mattress and pillow cover, air purifier, and environmental controls. \par \par problem 5: GERD/LPR\par -Add Pepcid 40 mg QHS \par - Recommended Apple cider vineger tablets. \par -Rule of 2s: avoid eating too much, eating too late, eating too spicy, eating two hours before bed\par -Things to avoid including overeating, spicy foods, tight clothing, eating within three hours of bed, this list is not all inclusive. \par -For treatment of reflux, possible options discussed including diet control, H2 blockers, PPIs, as well as coating motility agents discussed as treatment options. Timing of meals and proximity of last meal to sleep were discussed. If symptoms persist, a formal gastrointestinal evaluation is needed. \par \par problem 6: sensory neuropathic cough (controlled)\par -continue Amitriptyline 10 mg up to TID, QHS \par -Sensory neuropathic cough is an etiology of cough that is often realized once someone has been ruled out for common disease such as: asthma, COPD, eosinophilic bronchitis, bronchiectasis, post nasal drip, and GERD. It sometimes develops following a URI, herpes zoster outbreak in pharynx or thyroid or cervical spine injury. However, many patients have no identifiable antecedent explanation. \par \par problem 7: r/o diaphragm dysfunction\par -fluoroscopy of the diaphragm was all normal\par \par problem 8: low immunity \par -continue to use Gammagard weekly \par -recommended early intervention  \par -s/p Prevnar 13 vaccine / Pneumovax\par \par *******************************PRE-OP CLEARANCE****************************\par \par Problem 9: Pre-Op Clearance\par -at this point in time there are no absolute pulmonary contraindications to go forward with the planned procedure \par -at the time of surgery s/he should have optimal pain control, incentive spirometry, early ambulation, DVT and GI prophylaxis.\par \par problem 10: health maintenance\par -s/p 2019 flu shot\par -s/p 1st Shingrix vaccine, s/p 2nd shot\par -recommended strep pneumonia vaccines: Prevnar-13 vaccine, followed by Pneumo vaccine 23 on year following\par -recommended early intervention for URIs\par -recommended osteoporosis evaluations\par -recommended early dermatological evaluations\par -recommended after the age of 50 to the age of 70, colonoscopy every 5 years\par \par F/U in 3 months with SPI and NiOx\par She is encouraged to call with any changes, concerns, or questions

## 2019-12-24 NOTE — ADDENDUM
[FreeTextEntry1] : Documented by Dorian Schaefer acting as a scribe for Dr. Munir Malin on 12/24/2019.\par \par All medical record entries made by the Scribe were at my, Dr. Munir Malin's, direction and personally dictated by me on 12/24/2019. I have reviewed the chart and agree that the record accurately reflects my personal performance of the history, physical exam, assessment and plan. I have also personally directed, reviewed, and agree with the discharge instructions.

## 2019-12-24 NOTE — REVIEW OF SYSTEMS
[Negative] : Sleep Disorder [Recent Wt Loss (___ Lbs)] : recent [unfilled] ~Ulb weight loss [Cough] : cough [As Noted in HPI] : as noted in HPI [Fever] : no fever [Chills] : no chills [Nasal Congestion] : no nasal congestion [Postnasal Drip] : no postnasal drip [Sinus Problems] : no sinus problems [Sputum] : not coughing up ~M sputum [Dyspnea] : no dyspnea [Orthopnea] : no orthopnea [Chest Discomfort] : no chest discomfort [Diarrhea] : no diarrhea [Constipation] : no constipation [Dysphagia] : no dysphagia [Dizziness] : no dizziness [Difficulty Initiating Sleep] : no difficulty falling asleep [Difficulty Maintaining Sleep] : no difficulty maintaining sleep [Snoring] : no snoring

## 2020-03-23 ENCOUNTER — RX RENEWAL (OUTPATIENT)
Age: 55
End: 2020-03-23

## 2020-04-02 ENCOUNTER — APPOINTMENT (OUTPATIENT)
Dept: PULMONOLOGY | Facility: CLINIC | Age: 55
End: 2020-04-02
Payer: COMMERCIAL

## 2020-04-02 PROCEDURE — G2012 BRIEF CHECK IN BY MD/QHP: CPT

## 2020-04-02 RX ORDER — HYDROCODONE BITARTRATE AND ACETAMINOPHEN 5; 325 MG/1; MG/1
5-325 TABLET ORAL
Qty: 8 | Refills: 0 | Status: ACTIVE | COMMUNITY
Start: 2020-01-17

## 2020-04-02 RX ORDER — PREDNISONE 10 MG/1
10 TABLET ORAL
Qty: 30 | Refills: 0 | Status: DISCONTINUED | COMMUNITY
Start: 2020-03-02 | End: 2020-04-02

## 2020-04-02 RX ORDER — NEOMYCIN AND POLYMYXIN B SULFATES AND DEXAMETHASONE 3.5; 10000; 1 MG/G; [IU]/G; MG/G
3.5-10000-0.1 OINTMENT OPHTHALMIC
Qty: 4 | Refills: 0 | Status: ACTIVE | COMMUNITY
Start: 2020-01-04

## 2020-04-02 NOTE — HISTORY OF PRESENT ILLNESS
[Home] : at home, [unfilled] , at the time of the visit. [Medical Office: ([facility])___] : at ~his/her~ medical office located in [place] [Patient] : the patient [Self] : self [FreeTextEntry2] : CARLITO SHERMAN [FreeTextEntry1] : Ms. Kunz is a 54 year old female with a history of abnormal chest CT, severe persistent asthma, BOOP, cough, eosinophilic asthma/PNA, GERD, lung nodules, PND, and SOB and was spoken to over the phone today for a follow up. Her chief complaint is \par - she notes she was not feeling well again for a few weeks. \par - She was put on prednisone in the beginning of February \par - She has been coughing for about 2 1/2 weeks. \par - She came off of prednisone and her cough started acting up again. \par - she notes she took 30 mg of prednisone yesterday and today due to her constant coughing.\par - she notes her cough is very high pitched and its not coming from her chest. She feels it most up top. \par - She was taking amitriptyline and it stopped working well for her. \par - Shes been compliant with her medications but nothing has been doing a good job with the cough. \par - She notes she takes Famotidine for her LPR. \par - she notes shes taking Zithromax M/W/F. \par - she notes she has been home since the 16th of March. She has not gone to work. \par - she notes shes been walking a little over a mile up and down the hills, shes been tolerating it well. \par -she denies any headaches, nausea, vomiting, fever, chills, sweats, chest pain, chest pressure, diarrhea, constipation, dysphagia, dizziness, sour taste in the mouth, leg swelling, leg pain, itchy eyes, itchy ears.\par

## 2020-04-02 NOTE — END OF VISIT
[Time Spent: ___ minutes] : I have spent [unfilled] minutes of face to face time with the patient [FreeTextEntry3] : I have spent 15 minutes of time on the phone with CARLITO SHERMAN with their expressed consent.

## 2020-04-02 NOTE — ASSESSMENT
[FreeTextEntry1] : Ms. Kunz is a 54 year old female with a history of chronic sinus disease, GERD, Hashimoto's thyroiditis, BOOP, IgG deficiency, recent iron deficient anemia, asthma (eosinophilic) coming in for pulmonary reevaluation. She is s/p mastectomy and s/p hospitalization for "expander" Staph infection. She is now recovered and s/p Taxol, Herceptin and Perjetta for her breast cancer. 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 Risks, benefits were reviewed in detail. The guidelines for abnormalities include follow up CT scans at various intervals which could range from 6 weeks to 1 year intervals. If there is a change for the worse then consideration for a biopsy will be considered if you are a candidate. Second opinion evaluation with a thoracic surgeon or an interventional radiologist could be offered. \par \par problem 2: BOOP - confirmed\par -diagnosed by right VATs \par -follow up chest CT in 6/2020\par -continue Zithromax 500 mg M/W/F (restart)\par \par problem 3a: severe persistent asthma -(improved)\par -continue Bevespi 2 puffs\par -continue Asmanex 2 puffs BID\par -continue Singulair 10mg QHS\par -continue levalbuterol 0.31 by the nebulizer up to QID\par -followed by Atrovent by the nebulizer up to QID\par -followed by Pulmicort 0.5 by the nebulizer BID \par -Asthma is  believed to be caused by inherited (genetic) and environmental factor, but its exact cause is unknown. Asthma may be triggered by allergens, lung infections, or irritants in the air. Asthma triggers are different for each person\par -Inhaler technique reviewed as well as oral hygiene techniques reviewed with patient. Avoidance of cold air, extremes of temperature, rescue inhaler should be used before exercise. Order of medication reviewed with patient. Recommended use of a cool mist humidifier in the bedroom. \par \par problem 3b: eosinophilic  asthma\par -off Nucala ; Consider Fasenra or Dupixent \par -The safety and efficacy of Nucala was established in three double-blind, randomized, placebo-controlled trials in patients with severe asthma. Compared to a placebo, patients with severe asthma receiving Nucala had fewer exacerbation requiring hospitalization and/or emergency department visits, and a longer time to first exacerbation.  In addition, patients with severe asthma receiving Nucala experienced greater reductions in their daily maintenance oral corticosteroid dose, while maintaining asthma control compared with patients receiving placebo. Treatment with Nucala did not result in a significant improvement in lung function, as measured by the volume of air exhaled by patients in one second. The most common side effects include: headache, injection site reactions, back pain, weakness, and fatigue; hypersensitivity reactions can occur within hours or days including swelling of the face, mouth, and tongue, fainting, dizziness, hives, breathing problems, and rash; herpes zoster infections have occurred. The drug is a monoclonal antibody that inhibits interleukin -5 which helps regular eosinophils, a type of white blood cell that contributes to asthma. The over-production of eosinophils can cause inflammation in the lungs, increasing the frequency of asthma attacks. Patients must also take other medications, including high dose inhaled corticosteroids and at least one additional asthma drug. \par \par problem 4: allergic rhinitis\par -recommended Xlear or nasal saline, Navage.\par -recommended Sinugator \par -Environmental measures for allergies were encouraged including mattress and pillow cover, air purifier, and environmental controls. \par \par problem 5: GERD/LPR\par -Add Pepcid 40 mg QHS \par - Add Omeprazole 20 mg or 40 mg before breakfast\par - Recommended Apple cider vineger tablets. \par -Rule of 2s: avoid eating too much, eating too late, eating too spicy, eating two hours before bed\par -Things to avoid including overeating, spicy foods, tight clothing, eating within three hours of bed, this list is not all inclusive. \par -For treatment of reflux, possible options discussed including diet control, H2 blockers, PPIs, as well as coating motility agents discussed as treatment options. Timing of meals and proximity of last meal to sleep were discussed. If symptoms persist, a formal gastrointestinal evaluation is needed. \par \par problem 6: sensory neuropathic cough (controlled)\par -off Amitriptyline 10 mg up to TID, QHS \par -Add Baclofen 10 mg pre-meal and QHS\par -Sensory neuropathic cough is an etiology of cough that is often realized once someone has been ruled out for common disease such as: asthma, COPD, eosinophilic bronchitis, bronchiectasis, post nasal drip, and GERD. It sometimes develops following a URI, herpes zoster outbreak in pharynx or thyroid or cervical spine injury. However, many patients have no identifiable antecedent explanation. \par \par problem 7: r/o diaphragm dysfunction\par -fluoroscopy of the diaphragm was all normal\par \par problem 8: low immunity \par -continue to use Gammagard weekly \par -recommended early intervention  \par -s/p Prevnar 13 vaccine / Pneumovax\par \par \par problem 19: health maintenance\par -s/p 2019 flu shot\par -s/p 1st Shingrix vaccine, s/p 2nd shot\par -recommended strep pneumonia vaccines: Prevnar-13 vaccine, followed by Pneumo vaccine 23 on year following\par -recommended early intervention for URIs\par -recommended osteoporosis evaluations\par -recommended early dermatological evaluations\par -recommended after the age of 50 to the age of 70, colonoscopy every 5 years\par \par F/U in 3 months with SPI and NiOx\par She is encouraged to call with any changes, concerns, or questions

## 2020-04-02 NOTE — ADDENDUM
[FreeTextEntry1] : Documented by Sloane Benítez acting as a scribe for Dr. Munir Malin on 04/02/2020.\par \par All medical record entries made by the Scribe were at my, Dr. Munir Malin's, direction and personally dictated by me on 04/02/2020. I have reviewed the chart and agree that the record accurately reflects my personal performance of the history, physical exam, assessment and plan. I have also personally directed, reviewed, and agree with the discharge instructions.\par

## 2020-04-02 NOTE — REASON FOR VISIT
[Follow-Up] : a follow-up visit [FreeTextEntry1] : via telephonic consult for abnormal chest CT, severe persistent asthma, BOOP, cough, eosinophilic asthma/PNA, GERD, lung nodules, PND, and SOB

## 2020-05-21 ENCOUNTER — RX RENEWAL (OUTPATIENT)
Age: 55
End: 2020-05-21

## 2020-05-22 ENCOUNTER — RX RENEWAL (OUTPATIENT)
Age: 55
End: 2020-05-22

## 2020-06-15 ENCOUNTER — APPOINTMENT (OUTPATIENT)
Dept: PULMONOLOGY | Facility: CLINIC | Age: 55
End: 2020-06-15
Payer: COMMERCIAL

## 2020-06-15 VITALS — WEIGHT: 167 LBS | HEIGHT: 63 IN | BODY MASS INDEX: 29.59 KG/M2

## 2020-06-15 PROCEDURE — 99214 OFFICE O/P EST MOD 30 MIN: CPT | Mod: 95

## 2020-06-15 RX ORDER — FLUCONAZOLE 150 MG/1
150 TABLET ORAL
Qty: 3 | Refills: 3 | Status: DISCONTINUED | COMMUNITY
Start: 2019-09-23 | End: 2020-06-15

## 2020-06-15 RX ORDER — AMITRIPTYLINE HYDROCHLORIDE 10 MG/1
10 TABLET, FILM COATED ORAL 3 TIMES DAILY
Qty: 90 | Refills: 5 | Status: DISCONTINUED | COMMUNITY
Start: 2019-08-13 | End: 2020-06-15

## 2020-06-15 NOTE — PROCEDURE
[FreeTextEntry1] : CT Chest (11.23.2019) reveals Waxing and waning pulmonary nodules most likely representing organizing pneumonia. Recommend follow-up chest CT in 3-6 months in this patient with history of breast cancer.

## 2020-06-15 NOTE — ASSESSMENT
[FreeTextEntry1] : Ms. Kunz is a 55 year old female with a history of chronic sinus disease, GERD, Hashimoto's thyroiditis, BOOP, IgG deficiency, recent iron deficient anemia, asthma (eosinophilic) coming in for pulmonary reevaluation. She is s/p mastectomy and s/p hospitalization for "expander" Staph infection. She is now recovered and s/p Taxol, Herceptin and Perjetta for her breast cancer. She is now s/p radiation therapy, and spoken to via video call today.  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 Proper dietary restraint is necessary specifically salt containing foods, if any reaction may occur should be reported. \par \par problem 1b: New abnormal CT (nodules) \par - inflammatory disease c/w BOOP (due 6/2020)\par \par -CAT scans are the only radiological modality to identify abnormalities w/in the lungs with regards to nodules/masses/lymph nodes. Risks, benefits were reviewed in detail. The guidelines for abnormalities include follow up CT scans at various intervals which could range from 6 weeks to 1 year intervals. If there is a change for the worse then consideration for a biopsy will be considered if you are a candidate. Second opinion evaluation with a thoracic surgeon or an interventional radiologist could be offered. \par \par problem 2: BOOP - confirmed\par -diagnosed by right VATs \par -follow up chest CT in 6/2020\par -continue Zithromax 500 mg M/W/F (continue)\par \par problem 3a: severe persistent asthma -(improved)\par -continue Bevespi 2 puffs\par -continue Asmanex 2 puffs BID\par -continue Singulair 10mg QHS\par -continue levalbuterol 0.31 by the nebulizer up to QID\par -followed by Atrovent by the nebulizer up to QID\par -followed by Pulmicort 0.5 by the nebulizer BID \par -Asthma is  believed to be caused by inherited (genetic) and environmental factor, but its exact cause is unknown. Asthma may be triggered by allergens, lung infections, or irritants in the air. Asthma triggers are different for each person\par -Inhaler technique reviewed as well as oral hygiene techniques reviewed with patient. Avoidance of cold air, extremes of temperature, rescue inhaler should be used before exercise. Order of medication reviewed with patient. Recommended use of a cool mist humidifier in the bedroom. \par \par problem 3b: eosinophilic  asthma\par -off Nucala ; Consider Fasenra or Dupixent \par -The safety and efficacy of Nucala was established in three double-blind, randomized, placebo-controlled trials in patients with severe asthma. Compared to a placebo, patients with severe asthma receiving Nucala had fewer exacerbation requiring hospitalization and/or emergency department visits, and a longer time to first exacerbation.  In addition, patients with severe asthma receiving Nucala experienced greater reductions in their daily maintenance oral corticosteroid dose, while maintaining asthma control compared with patients receiving placebo. Treatment with Nucala did not result in a significant improvement in lung function, as measured by the volume of air exhaled by patients in one second. The most common side effects include: headache, injection site reactions, back pain, weakness, and fatigue; hypersensitivity reactions can occur within hours or days including swelling of the face, mouth, and tongue, fainting, dizziness, hives, breathing problems, and rash; herpes zoster infections have occurred. The drug is a monoclonal antibody that inhibits interleukin -5 which helps regular eosinophils, a type of white blood cell that contributes to asthma. The over-production of eosinophils can cause inflammation in the lungs, increasing the frequency of asthma attacks. Patients must also take other medications, including high dose inhaled corticosteroids and at least one additional asthma drug. \par \par problem 4: allergic rhinitis\par -recommended Xlear or nasal saline, Navage.\par -recommended Sinugator \par -Environmental measures for allergies were encouraged including mattress and pillow cover, air purifier, and environmental controls. \par \par problem 5: GERD/LPR\par -continue Pepcid 40 mg QHS \par - continue Omeprazole 20 mg or 40 mg before breakfast\par - Recommended Apple cider vineger tablets. \par -Rule of 2s: avoid eating too much, eating too late, eating too spicy, eating two hours before bed\par -Things to avoid including overeating, spicy foods, tight clothing, eating within three hours of bed, this list is not all inclusive. \par -For treatment of reflux, possible options discussed including diet control, H2 blockers, PPIs, as well as coating motility agents discussed as treatment options. Timing of meals and proximity of last meal to sleep were discussed. If symptoms persist, a formal gastrointestinal evaluation is needed. \par \par problem 6: sensory neuropathic cough (controlled)\par -off Amitriptyline 10 mg up to TID, QHS \par -continue Baclofen 10 mg pre-meal and QHS\par -continue Tessalon Perles 200 mg qHS\par -Sensory neuropathic cough is an etiology of cough that is often realized once someone has been ruled out for common disease such as: asthma, COPD, eosinophilic bronchitis, bronchiectasis, post nasal drip, and GERD. It sometimes develops following a URI, herpes zoster outbreak in pharynx or thyroid or cervical spine injury. However, many patients have no identifiable antecedent explanation. \par \par problem 7: r/o diaphragm dysfunction\par -fluoroscopy of the diaphragm was all normal\par \par problem 8: low immunity \par -continue to use Gammagard weekly \par -recommended early intervention  \par -s/p Prevnar 13 vaccine / Pneumovax\par \par problem 9: Health Maintenance/COVID19 Precautions:\par - Clean your hands often. Wash your hands often with soap and water for at least 20 seconds, especially after blowing your nose, coughing, or sneezing, or having been in a public place.\par - If soap and water are not available, use a hand  that contains at least 60% alcohol.\par - To the extent possible, avoid touching high-touch surfaces in public places - elevator buttons, door handles, handrails, handshaking with people, etc. Use a tissue or your sleeve to cover your hand or finger if you must touch something.\par - Wash your hands after touching surfaces in public places.\par - Avoid touching your face, nose, eyes, etc.\par - Clean and disinfect your home to remove germs: practice routine cleaning of frequently touched surfaces (for example: tables, doorknobs, light switches, handles, desks, toilets, faucets, sinks & cell phones)\par - Avoid crowds, especially in poorly ventilated spaces. Your risk of exposure to respiratory viruses like COVID-19 may increase in crowded, closed-in settings with little air circulation if there are people in the crowd who are sick. All patients are recommended to practice social distancing and stay at least 6 feet away from others.\par - Avoid all non-essential travel including plane trips, and especially avoid embarking on cruise ships.\par -If COVID-19 is spreading in your community, take extra measures to put distance between yourself and other people to further reduce your risk of being exposed to this new virus.\par -Stay home as much as possible.\par - Consider ways of getting food brought to your house through family, social, or commercial networks\par -Be aware that the virus has been known to live in the air up to 3 hours post exposure, cardboard up to 24 hours post exposure, copper up to 4 hours post exposure, steel and plastic up to 2-3 days post exposure. Risk of transmission from these surfaces are affected by many variables.\par Immune Support Recommendations:\par -OTC Vitamin C 500mg BID \par -OTC Quercetin 250-500mg BID \par -OTC Zinc 75-100mg per day \par -OTC Melatonin 1 or 2 mg a night \par -OTC Vitamin D 1-4000mg per day \par -OTC Tonic Water 8oz per day\par Asthma and COVID19:\par You need to make sure your asthma is under control. This often requires the use of inhaled corticosteroids (and sometimes oral corticosteroids). Inhaled corticosteroids do not likely reduce your immune system’s ability to fight infections, but oral corticosteroids may. It is important to use the steps above to protect yourself to limit your exposure to any respiratory virus. \par \par problem 10: health maintenance\par -s/p 2019 flu shot\par -s/p 1st Shingrix vaccine, s/p 2nd shot\par -recommended strep pneumonia vaccines: Prevnar-13 vaccine, followed by Pneumo vaccine 23 on year following\par -recommended early intervention for URIs\par -recommended osteoporosis evaluations\par -recommended early dermatological evaluations\par -recommended after the age of 50 to the age of 70, colonoscopy every 5 years\par \par F/U in 3 months with SPI and NiOx\par She is encouraged to call with any changes, concerns, or questions

## 2020-06-15 NOTE — REASON FOR VISIT
[Follow-Up] : a follow-up visit [FreeTextEntry1] : video call- abnormal chest CT, severe persistent asthma, BOOP, cough, eosinophilic asthma/PNA, GERD, lung nodules, PND, and SOB

## 2020-06-15 NOTE — ADDENDUM
[FreeTextEntry1] : Documented by Felice Bergeron acting as a scribe for Dr. Munir Malin on 06/15/2020.\par \par All medical record entries made by the Scribe were at my, Dr. Munir Malin's, direction and personally dictated by me on 06/15/2020. I have reviewed the chart and agree that the record accurately reflects my personal performance of the history, physical exam, assessment and plan. I have also personally directed, reviewed, and agree with the discharge instructions.

## 2020-06-15 NOTE — PHYSICAL EXAM
[No Acute Distress] : no acute distress [No Deformities] : no deformities [Well Nourished] : well nourished [Well Developed] : well developed

## 2020-06-20 ENCOUNTER — APPOINTMENT (OUTPATIENT)
Dept: CT IMAGING | Facility: CLINIC | Age: 55
End: 2020-06-20
Payer: COMMERCIAL

## 2020-06-20 ENCOUNTER — OUTPATIENT (OUTPATIENT)
Dept: OUTPATIENT SERVICES | Facility: HOSPITAL | Age: 55
LOS: 1 days | End: 2020-06-20
Payer: COMMERCIAL

## 2020-06-20 DIAGNOSIS — Z00.00 ENCOUNTER FOR GENERAL ADULT MEDICAL EXAMINATION WITHOUT ABNORMAL FINDINGS: ICD-10-CM

## 2020-06-20 DIAGNOSIS — Z98.82 BREAST IMPLANT STATUS: Chronic | ICD-10-CM

## 2020-06-20 DIAGNOSIS — Z98.89 OTHER SPECIFIED POSTPROCEDURAL STATES: Chronic | ICD-10-CM

## 2020-06-20 DIAGNOSIS — Z90.10 ACQUIRED ABSENCE OF UNSPECIFIED BREAST AND NIPPLE: Chronic | ICD-10-CM

## 2020-06-20 DIAGNOSIS — R93.89 ABNORMAL FINDINGS ON DIAGNOSTIC IMAGING OF OTHER SPECIFIED BODY STRUCTURES: ICD-10-CM

## 2020-06-20 DIAGNOSIS — M71.30 OTHER BURSAL CYST, UNSPECIFIED SITE: Chronic | ICD-10-CM

## 2020-06-20 DIAGNOSIS — Z98.1 ARTHRODESIS STATUS: Chronic | ICD-10-CM

## 2020-06-20 PROCEDURE — 71250 CT THORAX DX C-: CPT | Mod: 26

## 2020-06-20 PROCEDURE — 71250 CT THORAX DX C-: CPT

## 2020-06-30 ENCOUNTER — RX RENEWAL (OUTPATIENT)
Age: 55
End: 2020-06-30

## 2020-07-01 ENCOUNTER — RX RENEWAL (OUTPATIENT)
Age: 55
End: 2020-07-01

## 2020-07-16 ENCOUNTER — TRANSCRIPTION ENCOUNTER (OUTPATIENT)
Age: 55
End: 2020-07-16

## 2020-07-22 ENCOUNTER — APPOINTMENT (OUTPATIENT)
Dept: ULTRASOUND IMAGING | Facility: CLINIC | Age: 55
End: 2020-07-22
Payer: COMMERCIAL

## 2020-07-22 ENCOUNTER — OUTPATIENT (OUTPATIENT)
Dept: OUTPATIENT SERVICES | Facility: HOSPITAL | Age: 55
LOS: 1 days | End: 2020-07-22
Payer: COMMERCIAL

## 2020-07-22 DIAGNOSIS — Z98.89 OTHER SPECIFIED POSTPROCEDURAL STATES: Chronic | ICD-10-CM

## 2020-07-22 DIAGNOSIS — Z98.82 BREAST IMPLANT STATUS: Chronic | ICD-10-CM

## 2020-07-22 DIAGNOSIS — Z00.8 ENCOUNTER FOR OTHER GENERAL EXAMINATION: ICD-10-CM

## 2020-07-22 DIAGNOSIS — M71.30 OTHER BURSAL CYST, UNSPECIFIED SITE: Chronic | ICD-10-CM

## 2020-07-22 DIAGNOSIS — Z90.10 ACQUIRED ABSENCE OF UNSPECIFIED BREAST AND NIPPLE: Chronic | ICD-10-CM

## 2020-07-22 DIAGNOSIS — Z98.1 ARTHRODESIS STATUS: Chronic | ICD-10-CM

## 2020-07-22 PROCEDURE — 76700 US EXAM ABDOM COMPLETE: CPT

## 2020-07-22 PROCEDURE — 76700 US EXAM ABDOM COMPLETE: CPT | Mod: 26

## 2020-07-27 ENCOUNTER — OUTPATIENT (OUTPATIENT)
Dept: OUTPATIENT SERVICES | Facility: HOSPITAL | Age: 55
LOS: 1 days | End: 2020-07-27
Payer: COMMERCIAL

## 2020-07-27 ENCOUNTER — APPOINTMENT (OUTPATIENT)
Dept: ULTRASOUND IMAGING | Facility: CLINIC | Age: 55
End: 2020-07-27
Payer: COMMERCIAL

## 2020-07-27 DIAGNOSIS — Z98.89 OTHER SPECIFIED POSTPROCEDURAL STATES: Chronic | ICD-10-CM

## 2020-07-27 DIAGNOSIS — Z90.10 ACQUIRED ABSENCE OF UNSPECIFIED BREAST AND NIPPLE: Chronic | ICD-10-CM

## 2020-07-27 DIAGNOSIS — Z98.82 BREAST IMPLANT STATUS: Chronic | ICD-10-CM

## 2020-07-27 DIAGNOSIS — Z15.02 GENETIC SUSCEPTIBILITY TO MALIGNANT NEOPLASM OF OVARY: ICD-10-CM

## 2020-07-27 DIAGNOSIS — M71.30 OTHER BURSAL CYST, UNSPECIFIED SITE: Chronic | ICD-10-CM

## 2020-07-27 DIAGNOSIS — Z98.1 ARTHRODESIS STATUS: Chronic | ICD-10-CM

## 2020-07-27 DIAGNOSIS — Z00.00 ENCOUNTER FOR GENERAL ADULT MEDICAL EXAMINATION WITHOUT ABNORMAL FINDINGS: ICD-10-CM

## 2020-07-27 PROCEDURE — 76856 US EXAM PELVIC COMPLETE: CPT

## 2020-07-27 PROCEDURE — 76830 TRANSVAGINAL US NON-OB: CPT | Mod: 26

## 2020-07-27 PROCEDURE — 76856 US EXAM PELVIC COMPLETE: CPT | Mod: 26

## 2020-07-27 PROCEDURE — 76830 TRANSVAGINAL US NON-OB: CPT

## 2020-08-06 ENCOUNTER — APPOINTMENT (OUTPATIENT)
Dept: OBGYN | Facility: CLINIC | Age: 55
End: 2020-08-06
Payer: COMMERCIAL

## 2020-08-06 VITALS
BODY MASS INDEX: 31.54 KG/M2 | HEIGHT: 63 IN | DIASTOLIC BLOOD PRESSURE: 80 MMHG | TEMPERATURE: 97.1 F | WEIGHT: 178 LBS | OXYGEN SATURATION: 98 % | HEART RATE: 91 BPM | SYSTOLIC BLOOD PRESSURE: 120 MMHG

## 2020-08-06 PROCEDURE — 99396 PREV VISIT EST AGE 40-64: CPT

## 2020-08-06 NOTE — PHYSICAL EXAM
[Awake] : awake [Alert] : alert [Acute Distress] : no acute distress [LAD] : no lymphadenopathy [Thyroid Nodule] : no thyroid nodule [Goiter] : no goiter [Mass] : no breast mass [Nipple Discharge] : no nipple discharge [Axillary LAD] : no axillary lymphadenopathy [Tender] : non tender [Distended] : not distended [H/Smegaly] : no hepatosplenomegaly [Oriented x3] : oriented to person, place, and time [Depressed Mood] : not depressed [Flat Affect] : affect not flat [No Lesions] : no genitalia lesions [Vulvar Atrophy] : no vulvar atrophy [Vulvitis] : no vulvitis [Labia Majora Erythema] : no erythema of the labia majora [Labia Minora Erythema] : no erythema of the labia minora [Labia Majora] : labia major [Labia Minora] : labia minora [Normal] : clitoris [Atrophy] : no atrophy [Erythema] : no erythema [Cystocele] : no cystocele [Rectocele] : no rectocele [Dry Mucosa] : moist mucosa [Uterine Prolapse] : no uterine prolapse [No Bleeding] : there was no active vaginal bleeding [Discharge] : had no discharge [Erosion] : had no erosions [Pap Obtained] : a Pap smear was performed [Motion Tenderness] : there was no cervical motion tenderness [Soft] :  the cervix was soft [Normal Position] : in a normal position [Tenderness] : nontender [Enlarged ___ wks] : not enlarged [Mass ___ cm] : no uterine mass was palpated [Uterine Adnexae] : were not tender and not enlarged [Adnexa Tenderness] : were not tender [Ovarian Mass (___ Cm)] : there were no adnexal masses

## 2020-08-06 NOTE — HISTORY OF PRESENT ILLNESS
[Hot Flashes] : no hot flashes [Night Sweats] : no night sweats [Vaginal Itching] : no vaginal itching [Dyspareunia] : no dyspareunia [Mood Changes] : no mood changes [Headache] : no headache [Fatigue] : no fatigue [Weight Change] : no weight change [Irritability] : no irritability [Forgetfulness] : no forgetfulness [Loss of Libido] : no loss of libido [Depression] : no depression [Anxiety] : no anxiety [Definite:  ___ (Date)] : the last menstrual period was [unfilled] [Normal Amount/Duration] : was of a normal amount and duration [Regular Cycle Intervals] : periods have been regular [Menarche Age: ____] : age at menarche was [unfilled] [Menstrual Cramps] : no menstrual cramps [Menopause Age: ____] : age at menopause was [unfilled] [Sexually Active] : is sexually active [Monogamous] : is monogamous [Male ___] : [unfilled] male

## 2020-08-06 NOTE — CHIEF COMPLAINT
[Annual Visit] : annual visit [FreeTextEntry1] : CHeck up Elevated LFT's Seeing Dr Kamara Plastic surgery pending with Dr Win at [facility] for Lt axilla and lower abdominal wall reconstruction

## 2020-08-07 LAB — HPV HIGH+LOW RISK DNA PNL CVX: NOT DETECTED

## 2020-08-12 ENCOUNTER — APPOINTMENT (OUTPATIENT)
Dept: MRI IMAGING | Facility: HOSPITAL | Age: 55
End: 2020-08-12

## 2020-08-13 ENCOUNTER — TRANSCRIPTION ENCOUNTER (OUTPATIENT)
Age: 55
End: 2020-08-13

## 2020-08-24 LAB — CYTOLOGY CVX/VAG DOC THIN PREP: ABNORMAL

## 2020-09-14 ENCOUNTER — APPOINTMENT (OUTPATIENT)
Dept: PULMONOLOGY | Facility: CLINIC | Age: 55
End: 2020-09-14

## 2020-09-14 ENCOUNTER — APPOINTMENT (OUTPATIENT)
Dept: PULMONOLOGY | Facility: CLINIC | Age: 55
End: 2020-09-14
Payer: COMMERCIAL

## 2020-09-14 VITALS
RESPIRATION RATE: 17 BRPM | OXYGEN SATURATION: 98 % | WEIGHT: 178 LBS | DIASTOLIC BLOOD PRESSURE: 80 MMHG | HEIGHT: 63 IN | TEMPERATURE: 98.2 F | SYSTOLIC BLOOD PRESSURE: 118 MMHG | HEART RATE: 99 BPM | BODY MASS INDEX: 31.54 KG/M2

## 2020-09-14 PROCEDURE — 95012 NITRIC OXIDE EXP GAS DETER: CPT

## 2020-09-14 PROCEDURE — 99214 OFFICE O/P EST MOD 30 MIN: CPT | Mod: 25

## 2020-09-14 NOTE — PHYSICAL EXAM
[No Acute Distress] : no acute distress [Normal Oropharynx] : normal oropharynx [Normal Appearance] : normal appearance [No Neck Mass] : no neck mass [Normal Rate/Rhythm] : normal rate/rhythm [Normal S1, S2] : normal s1, s2 [No Murmurs] : no murmurs [No Resp Distress] : no resp distress [No Abnormalities] : no abnormalities [Clear to Auscultation Bilaterally] : clear to auscultation bilaterally [Benign] : benign [Normal Gait] : normal gait [No Clubbing] : no clubbing [No Cyanosis] : no cyanosis [No Edema] : no edema [FROM] : FROM [Normal Color/ Pigmentation] : normal color/ pigmentation [No Focal Deficits] : no focal deficits [Oriented x3] : oriented x3 [Normal Affect] : normal affect [II] : Mallampati Class: II [TextBox_[id number]] : I:E ratio 1:3; clear

## 2020-09-14 NOTE — HISTORY OF PRESENT ILLNESS
[FreeTextEntry1] : Ms. Kunz is a 55 year old female with a history of abnormal chest CT, severe persistent asthma, BOOP, cough, eosinophilic asthma/PNA, GERD, lung nodules, PND, and SOB and was presents today for a follow up. Her chief complaint is \par -she is currently healing from abdominal and breast surgery on August 24th.\par -she reports she has been coughing recently, and doesn’t want to take Prednisone anymore\par -she reports she was cleared for Fasenra\par -she states her reflux is controlled\par -She notes Her bowels are regular \par -she notes she gained about 10 pounds since she has been more sedentary over the summer and from post surgery healing\par -she notes her liver enzymes were high at one of her recent blood works, and it returned to normal, and doesn’t know why\par \par -she denies any chest pain, chest pressure, diarrhea, constipation, dysphagia, dizziness, sour taste in the mouth, heartburn, reflux

## 2020-09-14 NOTE — PROCEDURE
[FreeTextEntry1] : FENO was 24; a normal value being less than 25\par Fractional exhaled nitric oxide (FENO) is regarded as a simple, noninvasive method for assessing eosinophilic airway inflammation. Produced by a variety of cells within the lung, nitric oxide (NO) concentrations are generally low in healthy individuals. However, high concentrations of NO appear to be involved in nonspecific host defense mechanisms and chronic inflammatory diseases such as asthma. The American Thoracic Society (ATS) therefore has recommended using FENO to aid in the diagnosis and monitoring of eosinophilic airway inflammation and asthma, and for identifying steroid responsive individuals whose chronic respiratory symptoms may be caused by airway inflammation. \par \par Recent bloodwork (6.20.2020) reveals wbc 3.86, hgb 11.9, hct 40.4, , eosinophils of 3.9% or .15\par \par Recent blood work (9/10/2020) reveals wbc 3.85, hgb 10.9, plt 272, cmp normal, estrogen 45\par \par CT abdomen (8/5/2020) reveals fatty liver. Normal appearance of biliary tree. Pancreatic cysts measuring up to 6 mm. Follow-up MRI/MRCP in one year is suggested to assess stability.\par Addendum: There are multiple bilateral pulmonary opacities. The patient states that she tested negative for coronavirus. CT is suggested.\par \par Chest CT (6/20/2020) reveals new scattered nodules seen throughout both lungs which appear to be airway centered. Additionally some other nodule seen on previous exam are no longer visible. Once again, these are likely related to underlying areas of organizing pneumonia and lymphoid hyperplasia, as described on previous biopsy of September 2019.

## 2020-09-14 NOTE — ASSESSMENT
[FreeTextEntry1] : Ms. Kunz is a 55 year old female with a history of chronic sinus disease, GERD, Hashimoto's thyroiditis, BOOP, IgG deficiency, recent iron deficient anemia, asthma (eosinophilic) coming in for pulmonary reevaluation. She is s/p mastectomy and s/p hospitalization for "expander" Staph infection. She is now recovered and s/p Taxol, Herceptin and Perjetta for her breast cancer. She is now s/p radiation therapy, and presents to the office for a pulmonary follow up.  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 Proper dietary restraint is necessary specifically salt containing foods, if any reaction may occur should be reported. \par \par problem 1b: New abnormal CT (nodules) \par - inflammatory disease c/w BOOP (last: 6/2020, Next 12/2020)\par \par -CAT scans are the only radiological modality to identify abnormalities w/in the lungs with regards to nodules/masses/lymph nodes. Risks, benefits were reviewed in detail. The guidelines for abnormalities include follow up CT scans at various intervals which could range from 6 weeks to 1 year intervals. If there is a change for the worse then consideration for a biopsy will be considered if you are a candidate. Second opinion evaluation with a thoracic surgeon or an interventional radiologist could be offered. \par \par problem 2: BOOP - confirmed\par -diagnosed by right VATs \par -follow up chest CT last in 6/2020, next 12/2020\par -continue Zithromax 500 mg M/W/F (continue)\par \par problem 3a: severe persistent asthma -(improved)\par -continue Bevespi 2 puffs\par -continue Asmanex 2 puffs BID\par -continue Singulair 10mg QHS\par -continue levalbuterol 0.31 by the nebulizer up to QID\par -followed by Atrovent by the nebulizer up to QID\par -followed by Pulmicort 0.5 by the nebulizer BID \par -Asthma is  believed to be caused by inherited (genetic) and environmental factor, but its exact cause is unknown. Asthma may be triggered by allergens, lung infections, or irritants in the air. Asthma triggers are different for each person\par -Inhaler technique reviewed as well as oral hygiene techniques reviewed with patient. Avoidance of cold air, extremes of temperature, rescue inhaler should be used before exercise. Order of medication reviewed with patient. Recommended use of a cool mist humidifier in the bedroom. \par \par problem 3b: eosinophilic  asthma\par -off Nucala ; Consider Fasenra or Dupixent. To start Fasenra on 9/17/2020\par -The safety and efficacy of Nucala was established in three double-blind, randomized, placebo-controlled trials in patients with severe asthma. Compared to a placebo, patients with severe asthma receiving Nucala had fewer exacerbation requiring hospitalization and/or emergency department visits, and a longer time to first exacerbation.  In addition, patients with severe asthma receiving Nucala experienced greater reductions in their daily maintenance oral corticosteroid dose, while maintaining asthma control compared with patients receiving placebo. Treatment with Nucala did not result in a significant improvement in lung function, as measured by the volume of air exhaled by patients in one second. The most common side effects include: headache, injection site reactions, back pain, weakness, and fatigue; hypersensitivity reactions can occur within hours or days including swelling of the face, mouth, and tongue, fainting, dizziness, hives, breathing problems, and rash; herpes zoster infections have occurred. The drug is a monoclonal antibody that inhibits interleukin -5 which helps regular eosinophils, a type of white blood cell that contributes to asthma. The over-production of eosinophils can cause inflammation in the lungs, increasing the frequency of asthma attacks. Patients must also take other medications, including high dose inhaled corticosteroids and at least one additional asthma drug. \par \par problem 4: allergic rhinitis\par -recommended Xlear or nasal saline, Navage.\par -recommended Sinugator \par -Environmental measures for allergies were encouraged including mattress and pillow cover, air purifier, and environmental controls. \par \par problem 5: GERD/LPR\par -continue Pepcid 40 mg QHS \par - continue Omeprazole 20 mg or 40 mg before breakfast\par - Recommended Apple cider vineger tablets. \par -Rule of 2s: avoid eating too much, eating too late, eating too spicy, eating two hours before bed\par -Things to avoid including overeating, spicy foods, tight clothing, eating within three hours of bed, this list is not all inclusive. \par -For treatment of reflux, possible options discussed including diet control, H2 blockers, PPIs, as well as coating motility agents discussed as treatment options. Timing of meals and proximity of last meal to sleep were discussed. If symptoms persist, a formal gastrointestinal evaluation is needed. \par \par problem 6: sensory neuropathic cough (controlled)\par -off Amitriptyline 10 mg up to TID, QHS \par -continue Baclofen 10 mg pre-meal and QHS\par -continue Tessalon Perles 200 mg qHS\par -Sensory neuropathic cough is an etiology of cough that is often realized once someone has been ruled out for common disease such as: asthma, COPD, eosinophilic bronchitis, bronchiectasis, post nasal drip, and GERD. It sometimes develops following a URI, herpes zoster outbreak in pharynx or thyroid or cervical spine injury. However, many patients have no identifiable antecedent explanation. \par \par problem 7: r/o diaphragm dysfunction\par -fluoroscopy of the diaphragm was all normal\par \par problem 8: low immunity \par -continue to use Gammagard weekly \par -recommended early intervention  \par -s/p Prevnar 13 vaccine / Pneumovax\par \par problem 9: Health Maintenance/COVID19 Precautions:\par - Clean your hands often. Wash your hands often with soap and water for at least 20 seconds, especially after blowing your nose, coughing, or sneezing, or having been in a public place.\par - If soap and water are not available, use a hand  that contains at least 60% alcohol.\par - To the extent possible, avoid touching high-touch surfaces in public places - elevator buttons, door handles, handrails, handshaking with people, etc. Use a tissue or your sleeve to cover your hand or finger if you must touch something.\par - Wash your hands after touching surfaces in public places.\par - Avoid touching your face, nose, eyes, etc.\par - Clean and disinfect your home to remove germs: practice routine cleaning of frequently touched surfaces (for example: tables, doorknobs, light switches, handles, desks, toilets, faucets, sinks & cell phones)\par - Avoid crowds, especially in poorly ventilated spaces. Your risk of exposure to respiratory viruses like COVID-19 may increase in crowded, closed-in settings with little air circulation if there are people in the crowd who are sick. All patients are recommended to practice social distancing and stay at least 6 feet away from others.\par - Avoid all non-essential travel including plane trips, and especially avoid embarking on cruise ships.\par -If COVID-19 is spreading in your community, take extra measures to put distance between yourself and other people to further reduce your risk of being exposed to this new virus.\par -Stay home as much as possible.\par - Consider ways of getting food brought to your house through family, social, or commercial networks\par -Be aware that the virus has been known to live in the air up to 3 hours post exposure, cardboard up to 24 hours post exposure, copper up to 4 hours post exposure, steel and plastic up to 2-3 days post exposure. Risk of transmission from these surfaces are affected by many variables.\par Immune Support Recommendations:\par -OTC Vitamin C 500mg BID \par -OTC Quercetin 250-500mg BID \par -OTC Zinc 75-100mg per day \par -OTC Melatonin 1 or 2 mg a night \par -OTC Vitamin D 1-4000mg per day \par -OTC Tonic Water 8oz per day\par Asthma and COVID19:\par You need to make sure your asthma is under control. This often requires the use of inhaled corticosteroids (and sometimes oral corticosteroids). Inhaled corticosteroids do not likely reduce your immune system’s ability to fight infections, but oral corticosteroids may. It is important to use the steps above to protect yourself to limit your exposure to any respiratory virus. \par \par problem 10: health maintenance\par -s/p 2020 flu shot\par -s/p 1st Shingrix vaccine, s/p 2nd shot\par -recommended strep pneumonia vaccines: Prevnar-13 vaccine, followed by Pneumo vaccine 23 on year following\par -recommended early intervention for URIs\par -recommended osteoporosis evaluations\par -recommended early dermatological evaluations\par -recommended after the age of 50 to the age of 70, colonoscopy every 5 years\par \par F/U in 3 months with SPI and NiOx\par She is encouraged to call with any changes, concerns, or questions

## 2020-09-14 NOTE — ADDENDUM
[FreeTextEntry1] : Documented by Felice Bergeron acting as a scribe for Dr. Munir Malin on 09/14/2020.\par \par All medical record entries made by the Scribe were at my, Dr. Munir Malin's, direction and personally dictated by me on 09/14/2020 . I have reviewed the chart and agree that the record accurately reflects my personal performance of the history, physical exam, assessment and plan. I have also personally directed, reviewed, and agree with the discharge instructions. \par

## 2020-09-27 ENCOUNTER — RX RENEWAL (OUTPATIENT)
Age: 55
End: 2020-09-27

## 2020-09-27 NOTE — HISTORY OF PRESENT ILLNESS
Chief Complaint:    Chief Complaint   Patient presents with   • Abdominal Pain     x2 days, menstrual cramps    • Rash     x2 days, cold sore on top lip,        History of Present Illness:    This is a new problem. She has history of cold sores on lips, she usually gets about twice a year, but her last episode was about 2 months ago and now she has another cold sore on upper lip x 2 days. Acyclovir ? dose, but she thinks the stronger one, taken TID x 5 days works very well for previous cold sores. She also is currently on her menstrual period x 2 days and has history of headaches and menstrual cramps with her periods for which Ibuprofen 800 mg taken up to BID for 4-5 days helps very well. She is requesting these 2 medications as she does not have them here. Last Rxs for these were in [place]. Her current symptoms are typical of previous episodes.      Review of Systems:    Constitutional: Negative for fever, chills, and diaphoresis.   Eyes: Negative for change in vision, photophobia, pain, redness, and discharge.  ENT: Negative for ear pain, ear discharge, hearing loss, tinnitus, nasal congestion, nosebleeds, and sore throat.    Respiratory: Negative for cough, hemoptysis, sputum production, shortness of breath, wheezing, and stridor.    Cardiovascular: Negative for chest pain, palpitations, orthopnea, claudication, leg swelling, and PND.   Gastrointestinal: See HPI.  Genitourinary: Negative for dysuria, urinary urgency, urinary frequency, hematuria, and flank pain.   Musculoskeletal: Negative for myalgias, joint pain, neck pain, and back pain.   Skin: See HPI.   Neurological: See HPI.   Endo: Negative for polydipsia.   Heme: Does not bruise/bleed easily.   Psychiatric/Behavioral: Negative for depression, suicidal ideas, hallucinations, memory loss and substance abuse. The patient is not nervous/anxious and does not have insomnia.        Past Medical History:    History reviewed. No pertinent past medical  "history.    Past Surgical History:    History reviewed. No pertinent surgical history.    Social History:    Social History     Socioeconomic History   • Marital status: Single     Spouse name: Not on file   • Number of children: Not on file   • Years of education: Not on file   • Highest education level: Not on file   Occupational History   • Not on file   Social Needs   • Financial resource strain: Not on file   • Food insecurity     Worry: Not on file     Inability: Not on file   • Transportation needs     Medical: Not on file     Non-medical: Not on file   Tobacco Use   • Smoking status: Not on file   Substance and Sexual Activity   • Alcohol use: Not on file   • Drug use: Not on file   • Sexual activity: Not on file   Lifestyle   • Physical activity     Days per week: Not on file     Minutes per session: Not on file   • Stress: Not on file   Relationships   • Social connections     Talks on phone: Not on file     Gets together: Not on file     Attends [employer] service: Not on file     Active member of club or organization: Not on file     Attends meetings of clubs or organizations: Not on file     Relationship status: Not on file   • Intimate partner violence     Fear of current or ex partner: Not on file     Emotionally abused: Not on file     Physically abused: Not on file     Forced sexual activity: Not on file   Other Topics Concern   • Not on file   Social History Narrative   • Not on file     Family History:    History reviewed. No pertinent family history.    Medications:    No current outpatient medications on file prior to visit.     No current facility-administered medications on file prior to visit.      Allergies:    No Known Allergies      Vitals:    Vitals:    09/27/20 1642   BP: 122/80   Patient Position: Sitting   Pulse: 99   Resp: 16   Temp: 36.1 °C (96.9 °F)   TempSrc: Temporal   SpO2: 98%   Weight: 90.7 kg (200 lb)   Height: 1.778 m (5' 10\")       Physical Exam:    Constitutional: Vital " signs reviewed. Appears well-developed and well-nourished. No acute distress.   Eyes: Sclera white, conjunctivae clear. PERRLA.  ENT: External ears normal. Hearing normal. Cold sore on upper lip midline.  Neck: Neck supple.   Pulmonary/Chest: Respirations non-labored.   Abdomen: Bowel sounds are normal active. Soft, non-distended, and non-tender to palpation.  Lymph: Cervical nodes without tenderness or enlargement.  Musculoskeletal: Normal gait. Normal range of motion. No muscular atrophy or weakness.  Neurological: Alert and oriented to person, place, and time. CN 2-12 intact. Muscle tone normal. Coordination normal.   Skin: No rashes or lesions. Warm, dry, normal turgor.  Psychiatric: Normal mood and affect. Behavior is normal. Judgment and thought content normal.       Assessment / Plan:    1. Menstrual cramps  - ibuprofen (MOTRIN) 800 MG Tab; 1 TAB BY MOUTH UP TO TWICE A DAY ONLY IF NEEDED FOR PAIN. TAKE WITH FOOD.  Dispense: 60 Tab; Refill: 2    2. Nonintractable headache, unspecified chronicity pattern, unspecified headache type  - ibuprofen (MOTRIN) 800 MG Tab; 1 TAB BY MOUTH UP TO TWICE A DAY ONLY IF NEEDED FOR PAIN. TAKE WITH FOOD.  Dispense: 60 Tab; Refill: 2    3. Cold sore  - acyclovir (ZOVIRAX) 800 MG Tab; 1 TAB BY MOUTH 3 TIMES A DAY X 5 DAYS FOR COLD SORE (15 PILLS PER EPISODE).  Dispense: 30 Tab; Refill: 5      Discussed with her DDX, management options, and risks, benefits, and alternatives to treatment plan agreed upon.    We do not have any in-office pregnancy tests here that we can run, but her current symptoms feel typical of previous similar episodes and thus will treat with medications that have worked and been tolerated in the past.    Agreeable to medications prescribed.    Discussed expected course of duration, time for improvement, and to seek follow-up in Emergency Room, urgent care, or with PCP if getting worse at any time or not improving within expected time frame.   [Medical Office: ([facility])___] : at the medical office located in  [Home] : at home, [unfilled] , at the time of the visit. [Verbal consent obtained from patient] : the patient, [unfilled] [FreeTextEntry1] : Ms. Kunz is a 55 year old female with a history of abnormal chest CT, severe persistent asthma, BOOP, cough, eosinophilic asthma/PNA, GERD, lung nodules, PND, and SOB and was spoken to via video call today for a follow up. Her chief complaint is \par \par -she notes generally feeling well\par -she notes tolerating baclofen well and using BID\par -she notes now tapered off of prednisone, now on 2.5 mg \par -she notes exercising daily walking at least 2 miles\par -she notes energy levels are high\par -she denies SOB\par -she notes has been working from home since 3/16/2020\par -she notes not enough sleep averaging 5 hours of sleep on average\par -she notes allergies mildly active, but controlled\par -she notes taking Tessalon Perles and tolerating well\par -she notes still taking azithromycin MWF \par \par -she denies any chest pain, chest pressure, diarrhea, constipation, dysphagia, dizziness, sour taste in the mouth, leg swelling, leg pain, itchy eyes, itchy ears, heartburn, reflux, myalgias or arthralgias.

## 2020-11-02 ENCOUNTER — TRANSCRIPTION ENCOUNTER (OUTPATIENT)
Age: 55
End: 2020-11-02

## 2020-11-16 NOTE — PATIENT PROFILE ADULT - PRIMARY ROLES/RESPONSIBILITIES
Kelsey Zee PSYD  [facility] [place]    [address] [place] [address]  Phone: [phone]    Fax: [phone]     employed

## 2020-11-24 ENCOUNTER — RX RENEWAL (OUTPATIENT)
Age: 55
End: 2020-11-24

## 2020-12-21 PROBLEM — Z86.19 HISTORY OF CANDIDIASIS OF VAGINA: Status: RESOLVED | Noted: 2018-02-09 | Resolved: 2020-12-21

## 2020-12-30 ENCOUNTER — TRANSCRIPTION ENCOUNTER (OUTPATIENT)
Age: 55
End: 2020-12-30

## 2020-12-30 ENCOUNTER — APPOINTMENT (OUTPATIENT)
Dept: PULMONOLOGY | Facility: CLINIC | Age: 55
End: 2020-12-30
Payer: COMMERCIAL

## 2020-12-30 VITALS — BODY MASS INDEX: 30.39 KG/M2 | HEIGHT: 64 IN | WEIGHT: 178 LBS

## 2020-12-30 DIAGNOSIS — D72.819 DECREASED WHITE BLOOD CELL COUNT, UNSPECIFIED: ICD-10-CM

## 2020-12-30 DIAGNOSIS — D72.19 OTHER EOSINOPHILIA: ICD-10-CM

## 2020-12-30 PROCEDURE — 99214 OFFICE O/P EST MOD 30 MIN: CPT | Mod: 95

## 2020-12-30 NOTE — ASSESSMENT
[FreeTextEntry1] : Ms. Kunz is a 55 year old female with a history of chronic sinus disease, GERD, Hashimoto's thyroiditis, BOOP, IgG deficiency, recent iron deficient anemia, asthma (eosinophilic) coming in for pulmonary reevaluation. She is s/p mastectomy and s/p hospitalization for "expander" Staph infection. She is now recovered and s/p Taxol, Herceptin and Perjetta RT  for her breast cancer. 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 Proper dietary restraint is necessary specifically salt containing foods, if any reaction may occur should be reported. \par \par problem 1b: New abnormal CT (nodules) \par - inflammatory disease c/w BOOP (last: 6/2020, Next 12/2020) (Delay due to COVID)\par \par -CAT scans are the only radiological modality to identify abnormalities w/in the lungs with regards to nodules/masses/lymph nodes. Risks, benefits were reviewed in detail. The guidelines for abnormalities include follow up CT scans at various intervals which could range from 6 weeks to 1 year intervals. If there is a change for the worse then consideration for a biopsy will be considered if you are a candidate. Second opinion evaluation with a thoracic surgeon or an interventional radiologist could be offered. \par \par problem 2: BOOP - confirmed\par -diagnosed by right VATs \par -follow up chest CT last in 6/2020, next 12/2020 (delay due to COVID)\par -continue Zithromax 500 mg M/W/F (continue)\par \par \par Problem 2a: COVID-19 infection\par - hold dexamethasone \par - set up MAB infusion (risk factors: immunosuppressed, on biologics, low WBC count 2.92, leukopenia) \par \par Immune Support Recommendations:\par -OTC Vitamin C [id number] BID \par -OTC Quercetin 1000mg BID \par -OTC Zinc 50-100mg per day \par -OTC Melatonin 1-3mg a night \par -OTC Vitamin D 2000mg per day  \par \par \par \par problem 3a: severe persistent asthma -(improved)\par -continue Bevespi 2 puffs\par -continue Asmanex 2 puffs BID\par -continue Singulair 10mg QHS\par -continue levalbuterol 0.31 by the nebulizer up to QID\par -followed by Atrovent by the nebulizer up to QID\par -followed by Pulmicort 0.5 by the nebulizer BID \par -Asthma is  believed to be caused by inherited (genetic) and environmental factor, but its exact cause is unknown. Asthma may be triggered by allergens, lung infections, or irritants in the air. Asthma triggers are different for each person\par -Inhaler technique reviewed as well as oral hygiene techniques reviewed with patient. Avoidance of cold air, extremes of temperature, rescue inhaler should be used before exercise. Order of medication reviewed with patient. Recommended use of a cool mist humidifier in the bedroom. \par \par problem 3b: eosinophilic  asthma\par -off Nucala ; Consider Fasenra or Dupixent. To start Fasenra on 9/17/2020\par -The safety and efficacy of Nucala was established in three double-blind, randomized, placebo-controlled trials in patients with severe asthma. Compared to a placebo, patients with severe asthma receiving Nucala had fewer exacerbation requiring hospitalization and/or emergency department visits, and a longer time to first exacerbation.  In addition, patients with severe asthma receiving Nucala experienced greater reductions in their daily maintenance oral corticosteroid dose, while maintaining asthma control compared with patients receiving placebo. Treatment with Nucala did not result in a significant improvement in lung function, as measured by the volume of air exhaled by patients in one second. The most common side effects include: headache, injection site reactions, back pain, weakness, and fatigue; hypersensitivity reactions can occur within hours or days including swelling of the face, mouth, and tongue, fainting, dizziness, hives, breathing problems, and rash; herpes zoster infections have occurred. The drug is a monoclonal antibody that inhibits interleukin -5 which helps regular eosinophils, a type of white blood cell that contributes to asthma. The over-production of eosinophils can cause inflammation in the lungs, increasing the frequency of asthma attacks. Patients must also take other medications, including high dose inhaled corticosteroids and at least one additional asthma drug. \par \par problem 4: allergic rhinitis\par -recommended Xlear or nasal saline, Navage.\par -recommended Sinugator \par -Environmental measures for allergies were encouraged including mattress and pillow cover, air purifier, and environmental controls. \par \par problem 5: GERD/LPR\par -continue Pepcid 40 mg QHS \par - continue Omeprazole 20 mg or 40 mg before breakfast\par - Recommended Apple cider vineger tablets. \par -Rule of 2s: avoid eating too much, eating too late, eating too spicy, eating two hours before bed\par -Things to avoid including overeating, spicy foods, tight clothing, eating within three hours of bed, this list is not all inclusive. \par -For treatment of reflux, possible options discussed including diet control, H2 blockers, PPIs, as well as coating motility agents discussed as treatment options. Timing of meals and proximity of last meal to sleep were discussed. If symptoms persist, a formal gastrointestinal evaluation is needed. \par \par problem 6: sensory neuropathic cough (controlled)\par -off Amitriptyline 10 mg up to TID, QHS \par -continue Baclofen 10 mg pre-meal and QHS\par -continue Tessalon Perles 200 mg qHS\par -Sensory neuropathic cough is an etiology of cough that is often realized once someone has been ruled out for common disease such as: asthma, COPD, eosinophilic bronchitis, bronchiectasis, post nasal drip, and GERD. It sometimes develops following a URI, herpes zoster outbreak in pharynx or thyroid or cervical spine injury. However, many patients have no identifiable antecedent explanation. \par \par problem 7: r/o diaphragm dysfunction\par -fluoroscopy of the diaphragm was all normal\par \par problem 8: low immunity \par -continue to use Gammagard weekly \par -recommended early intervention  \par -s/p Prevnar 13 vaccine / Pneumovax\par \par problem 9: Health Maintenance/COVID19 Precautions:\par - Clean your hands often. Wash your hands often with soap and water for at least 20 seconds, especially after blowing your nose, coughing, or sneezing, or having been in a public place.\par - If soap and water are not available, use a hand  that contains at least 60% alcohol.\par - To the extent possible, avoid touching high-touch surfaces in public places - elevator buttons, door handles, handrails, handshaking with people, etc. Use a tissue or your sleeve to cover your hand or finger if you must touch something.\par - Wash your hands after touching surfaces in public places.\par - Avoid touching your face, nose, eyes, etc.\par - Clean and disinfect your home to remove germs: practice routine cleaning of frequently touched surfaces (for example: tables, doorknobs, light switches, handles, desks, toilets, faucets, sinks & cell phones)\par - Avoid crowds, especially in poorly ventilated spaces. Your risk of exposure to respiratory viruses like COVID-19 may increase in crowded, closed-in settings with little air circulation if there are people in the crowd who are sick. All patients are recommended to practice social distancing and stay at least 6 feet away from others.\par - Avoid all non-essential travel including plane trips, and especially avoid embarking on cruise ships.\par -If COVID-19 is spreading in your community, take extra measures to put distance between yourself and other people to further reduce your risk of being exposed to this new virus.\par -Stay home as much as possible.\par - Consider ways of getting food brought to your house through family, social, or commercial networks\par -Be aware that the virus has been known to live in the air up to 3 hours post exposure, cardboard up to 24 hours post exposure, copper up to 4 hours post exposure, steel and plastic up to 2-3 days post exposure. Risk of transmission from these surfaces are affected by many variables.\par Immune Support Recommendations:\par -OTC Vitamin C 500mg BID \par -OTC Quercetin 250-500mg BID \par -OTC Zinc 75-100mg per day \par -OTC Melatonin 1 or 2 mg a night \par -OTC Vitamin D 1-4000mg per day \par -OTC Tonic Water 8oz per day\par Asthma and COVID19:\par You need to make sure your asthma is under control. This often requires the use of inhaled corticosteroids (and sometimes oral corticosteroids). Inhaled corticosteroids do not likely reduce your immune system’s ability to fight infections, but oral corticosteroids may. It is important to use the steps above to protect yourself to limit your exposure to any respiratory virus. \par \par problem 10: health maintenance\par -s/p 2020 flu shot\par -s/p 1st Shingrix vaccine, s/p 2nd shot\par -recommended strep pneumonia vaccines: Prevnar-13 vaccine, followed by Pneumo vaccine 23 on year following\par -recommended early intervention for URIs\par -recommended osteoporosis evaluations\par -recommended early dermatological evaluations\par -recommended after the age of 50 to the age of 70, colonoscopy every 5 years\par \par F/U in 3 months with SPI and NiOx\par She is encouraged to call with any changes, concerns, or questions

## 2020-12-30 NOTE — REASON FOR VISIT
[Follow-Up] : a follow-up visit [FreeTextEntry1] : via video call - COVID-19 infection, abnormal chest CT, severe persistent asthma, BOOP, cough, eosinophilic asthma/PNA, GERD, lung nodules, PND, and SOB

## 2020-12-30 NOTE — ADDENDUM
[FreeTextEntry1] : Documented by Sloane Benítez acting as a scribe for Dr. Munir Malin on 12/30/2020 \par \par All medical record entries made by the Scribe were at my, Dr. Munir Malin's, direction and personally dictated by me on 12/30/2020 . I have reviewed the chart and agree that the record accurately reflects my personal performance of the history, physical exam, assessment and plan. I have also personally directed, reviewed, and agree with the discharge instructions.

## 2020-12-30 NOTE — HISTORY OF PRESENT ILLNESS
[Home] : at home, [unfilled] , at the time of the visit. [Medical Office: ([facility])___] : at the medical office located in  [Verbal consent obtained from patient] : the patient, [unfilled] [FreeTextEntry1] : Ms. Kunz is a 55 year old female with a history of abnormal chest CT, severe persistent asthma, BOOP, cough, eosinophilic asthma/PNA, GERD, lung nodules, PND, and SOB and was presents today via video call  for a follow up. Her chief complaint is \par  she was fine, she was taking Fasenra. She noticed she was coughing less. She is due to take it again next month. \par - no SOB. \par - she notes shes always walking for exercise but then she just found out she tested positive for COVID today. \par - she notes she got tested for COVID because her daughter had it. \par - she had the PCR test done. \par - she notes she coughs all the time, but for the past 5 days she has has a post nasal drip and a weird smell from her mouth whenever she would cough. \par - no fevers, chills, or sweats \par - she notes she brings up mucus with her cough \par - She  denies any visual issues, headaches, nausea, vomiting, fever, chills, sweats, chest pains, chest pressure, diarrhea, constipation, dysphagia, myalgia, dizziness, leg swelling, leg pain, itchy eyes, itchy ears.\par

## 2020-12-31 ENCOUNTER — OUTPATIENT (OUTPATIENT)
Dept: INPATIENT UNIT | Facility: HOSPITAL | Age: 55
LOS: 1 days | Discharge: HOME | End: 2020-12-31

## 2020-12-31 ENCOUNTER — APPOINTMENT (OUTPATIENT)
Dept: DISASTER EMERGENCY | Facility: CLINIC | Age: 55
End: 2020-12-31

## 2020-12-31 VITALS
OXYGEN SATURATION: 95 % | HEART RATE: 108 BPM | RESPIRATION RATE: 17 BRPM | SYSTOLIC BLOOD PRESSURE: 117 MMHG | TEMPERATURE: 99 F | DIASTOLIC BLOOD PRESSURE: 77 MMHG

## 2020-12-31 VITALS
DIASTOLIC BLOOD PRESSURE: 71 MMHG | OXYGEN SATURATION: 92 % | RESPIRATION RATE: 20 BRPM | TEMPERATURE: 99 F | SYSTOLIC BLOOD PRESSURE: 107 MMHG | HEART RATE: 97 BPM

## 2020-12-31 DIAGNOSIS — Z98.89 OTHER SPECIFIED POSTPROCEDURAL STATES: Chronic | ICD-10-CM

## 2020-12-31 DIAGNOSIS — Z98.82 BREAST IMPLANT STATUS: Chronic | ICD-10-CM

## 2020-12-31 DIAGNOSIS — U07.1 COVID-19: ICD-10-CM

## 2020-12-31 DIAGNOSIS — Z90.10 ACQUIRED ABSENCE OF UNSPECIFIED BREAST AND NIPPLE: Chronic | ICD-10-CM

## 2020-12-31 DIAGNOSIS — Z98.1 ARTHRODESIS STATUS: Chronic | ICD-10-CM

## 2020-12-31 DIAGNOSIS — M71.30 OTHER BURSAL CYST, UNSPECIFIED SITE: Chronic | ICD-10-CM

## 2020-12-31 RX ORDER — BAMLANIVIMAB 35 MG/ML
700 INJECTION, SOLUTION INTRAVENOUS ONCE
Refills: 0 | Status: COMPLETED | OUTPATIENT
Start: 2020-12-31 | End: 2020-12-31

## 2020-12-31 RX ADMIN — BAMLANIVIMAB 200 MILLIGRAM(S): 35 INJECTION, SOLUTION INTRAVENOUS at 09:10

## 2021-01-01 ENCOUNTER — TRANSCRIPTION ENCOUNTER (OUTPATIENT)
Age: 56
End: 2021-01-01

## 2021-01-02 ENCOUNTER — TRANSCRIPTION ENCOUNTER (OUTPATIENT)
Age: 56
End: 2021-01-02

## 2021-01-05 ENCOUNTER — NON-APPOINTMENT (OUTPATIENT)
Age: 56
End: 2021-01-05

## 2021-01-13 ENCOUNTER — APPOINTMENT (OUTPATIENT)
Dept: PULMONOLOGY | Facility: CLINIC | Age: 56
End: 2021-01-13
Payer: COMMERCIAL

## 2021-01-13 VITALS
HEART RATE: 105 BPM | RESPIRATION RATE: 16 BRPM | DIASTOLIC BLOOD PRESSURE: 72 MMHG | SYSTOLIC BLOOD PRESSURE: 126 MMHG | BODY MASS INDEX: 30 KG/M2 | TEMPERATURE: 95.6 F | OXYGEN SATURATION: 95 % | HEIGHT: 62 IN | WEIGHT: 163 LBS

## 2021-01-13 PROCEDURE — 99214 OFFICE O/P EST MOD 30 MIN: CPT | Mod: 25

## 2021-01-13 PROCEDURE — 99072 ADDL SUPL MATRL&STAF TM PHE: CPT

## 2021-01-13 PROCEDURE — 71046 X-RAY EXAM CHEST 2 VIEWS: CPT

## 2021-01-13 NOTE — REASON FOR VISIT
[Follow-Up] : a follow-up visit [FreeTextEntry1] : s/p COVID-19 infection, abnormal chest CT, severe persistent asthma, BOOP, cough, eosinophilic asthma/PNA, GERD, lung nodules, PND, and SOB

## 2021-01-13 NOTE — PHYSICAL EXAM
[No Acute Distress] : no acute distress [Normal Oropharynx] : normal oropharynx [Normal Appearance] : normal appearance [No Neck Mass] : no neck mass [Normal Rate/Rhythm] : normal rate/rhythm [Normal S1, S2] : normal s1, s2 [No Murmurs] : no murmurs [No Resp Distress] : no resp distress [Clear to Auscultation Bilaterally] : clear to auscultation bilaterally [No Abnormalities] : no abnormalities [Benign] : benign [Normal Gait] : normal gait [No Clubbing] : no clubbing [No Cyanosis] : no cyanosis [No Edema] : no edema [FROM] : FROM [Normal Color/ Pigmentation] : normal color/ pigmentation [No Focal Deficits] : no focal deficits [Oriented x3] : oriented x3 [Normal Affect] : normal affect [III] : Mallampati Class: III [TextBox_68] : I:E ratio 1:3; inspiratory crackles about 1/2 of the way up b/l

## 2021-01-13 NOTE — ASSESSMENT
[FreeTextEntry1] : Ms. Kunz is a 55 year old female with a history of chronic sinus disease, GERD, Hashimoto's thyroiditis, BOOP, IgG deficiency, recent iron deficient anemia, asthma (eosinophilic) coming in for pulmonary reevaluation. She is s/p mastectomy and s/p hospitalization for "expander" Staph infection. She is now recovered and s/p Taxol, Herceptin and Perjetta RT  for her breast cancer. 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 Proper dietary restraint is necessary specifically salt containing foods, if any reaction may occur should be reported. \par \par problem 1b: New abnormal CT (nodules) \par - inflammatory disease c/w BOOP (last: 6/2020, Next 12/2020) (Delay due to COVID)\par \par -CAT scans are the only radiological modality to identify abnormalities w/in the lungs with regards to nodules/masses/lymph nodes. Risks, benefits were reviewed in detail. The guidelines for abnormalities include follow up CT scans at various intervals which could range from 6 weeks to 1 year intervals. If there is a change for the worse then consideration for a biopsy will be considered if you are a candidate. Second opinion evaluation with a thoracic surgeon or an interventional radiologist could be offered. \par \par Problem 1C: Covid-19 Pneumonitis\par -Add a course of Prednisone; 30 mg for 5 days, then 20 mg for 5 days, then 10 mg for 5 days \par Information sheet given to the patient to be reviewed, this medication is never to be used without consulting the prescribing physician. Proper dietary restraint is necessary specifically salt containing foods, if any reaction may occur should be reported. \par \par -s/p monoclonal antibody infusions\par \par  COVID-19 Immune Support Recommendations:\par -OTC Vitamin C 500mg BID \par -OTC Quercetin 250-500mg BID \par -OTC Zinc 75-100mg per day \par -OTC Melatonin 1 or 2 mg a night \par -OTC Vitamin D 1-4000mg per day \par -OTC Tonic Water 8oz per day\par -Recommended to take the anti-inflammatory supplements: Liposomal Glutathione 500 mg BID ([employer]), Quercitin 1000 mg BID, SPM BID ([employer]), Berberine 1000 mg BID \par \par problem 2: BOOP - confirmed\par -diagnosed by right VATs \par -follow up chest CT last in 6/2020, next 12/2020 (delay due to COVID)\par -continue Zithromax 500 mg M/W/F (continue)\par \par \par Problem 2a: COVID-19 infection\par - hold dexamethasone \par - set up MAB infusion (risk factors: immunosuppressed, on biologics, low WBC count 2.92, leukopenia) \par \par Immune Support Recommendations:\par -OTC Vitamin C [id number] BID \par -OTC Quercetin 1000mg BID \par -OTC Zinc 50-100mg per day \par -OTC Melatonin 1-3mg a night \par -OTC Vitamin D 2000mg per day  \par \par \par \par problem 3a: severe persistent asthma -(improved)\par -continue Bevespi 2 puffs\par -continue Asmanex 2 puffs BID\par -continue Singulair 10mg QHS\par -continue levalbuterol 0.31 by the nebulizer up to QID\par -followed by Atrovent by the nebulizer up to QID\par -followed by Pulmicort 0.5 by the nebulizer BID \par -Asthma is  believed to be caused by inherited (genetic) and environmental factor, but its exact cause is unknown. Asthma may be triggered by allergens, lung infections, or irritants in the air. Asthma triggers are different for each person\par -Inhaler technique reviewed as well as oral hygiene techniques reviewed with patient. Avoidance of cold air, extremes of temperature, rescue inhaler should be used before exercise. Order of medication reviewed with patient. Recommended use of a cool mist humidifier in the bedroom. \par \par problem 3b: eosinophilic  asthma\par -off Nucala ; Consider Fasenra or Dupixent. To start Fasenra on 9/17/2020\par -The safety and efficacy of Nucala was established in three double-blind, randomized, placebo-controlled trials in patients with severe asthma. Compared to a placebo, patients with severe asthma receiving Nucala had fewer exacerbation requiring hospitalization and/or emergency department visits, and a longer time to first exacerbation.  In addition, patients with severe asthma receiving Nucala experienced greater reductions in their daily maintenance oral corticosteroid dose, while maintaining asthma control compared with patients receiving placebo. Treatment with Nucala did not result in a significant improvement in lung function, as measured by the volume of air exhaled by patients in one second. The most common side effects include: headache, injection site reactions, back pain, weakness, and fatigue; hypersensitivity reactions can occur within hours or days including swelling of the face, mouth, and tongue, fainting, dizziness, hives, breathing problems, and rash; herpes zoster infections have occurred. The drug is a monoclonal antibody that inhibits interleukin -5 which helps regular eosinophils, a type of white blood cell that contributes to asthma. The over-production of eosinophils can cause inflammation in the lungs, increasing the frequency of asthma attacks. Patients must also take other medications, including high dose inhaled corticosteroids and at least one additional asthma drug. \par \par problem 4: allergic rhinitis\par -recommended Xlear or nasal saline, Navage.\par -recommended Sinugator \par -Environmental measures for allergies were encouraged including mattress and pillow cover, air purifier, and environmental controls. \par \par problem 5: GERD/LPR\par -continue Pepcid 40 mg QHS \par - continue Omeprazole 20 mg or 40 mg before breakfast\par - Recommended Apple cider vineger tablets. \par -Rule of 2s: avoid eating too much, eating too late, eating too spicy, eating two hours before bed\par -Things to avoid including overeating, spicy foods, tight clothing, eating within three hours of bed, this list is not all inclusive. \par -For treatment of reflux, possible options discussed including diet control, H2 blockers, PPIs, as well as coating motility agents discussed as treatment options. Timing of meals and proximity of last meal to sleep were discussed. If symptoms persist, a formal gastrointestinal evaluation is needed. \par \par problem 6: sensory neuropathic cough (controlled)\par -off Amitriptyline 10 mg up to TID, QHS \par -continue Baclofen 10 mg pre-meal and QHS\par -continue Tessalon Perles 200 mg qHS\par -Sensory neuropathic cough is an etiology of cough that is often realized once someone has been ruled out for common disease such as: asthma, COPD, eosinophilic bronchitis, bronchiectasis, post nasal drip, and GERD. It sometimes develops following a URI, herpes zoster outbreak in pharynx or thyroid or cervical spine injury. However, many patients have no identifiable antecedent explanation. \par \par problem 7: r/o diaphragm dysfunction\par -fluoroscopy of the diaphragm was all normal\par \par problem 8: low immunity \par -continue to use Gammagard weekly \par -recommended early intervention  \par -s/p Prevnar 13 vaccine / Pneumovax\par \par problem 9: Health Maintenance/COVID19 Precautions:\par - Clean your hands often. Wash your hands often with soap and water for at least 20 seconds, especially after blowing your nose, coughing, or sneezing, or having been in a public place.\par - If soap and water are not available, use a hand  that contains at least 60% alcohol.\par - To the extent possible, avoid touching high-touch surfaces in public places - elevator buttons, door handles, handrails, handshaking with people, etc. Use a tissue or your sleeve to cover your hand or finger if you must touch something.\par - Wash your hands after touching surfaces in public places.\par - Avoid touching your face, nose, eyes, etc.\par - Clean and disinfect your home to remove germs: practice routine cleaning of frequently touched surfaces (for example: tables, doorknobs, light switches, handles, desks, toilets, faucets, sinks & cell phones)\par - Avoid crowds, especially in poorly ventilated spaces. Your risk of exposure to respiratory viruses like COVID-19 may increase in crowded, closed-in settings with little air circulation if there are people in the crowd who are sick. All patients are recommended to practice social distancing and stay at least 6 feet away from others.\par - Avoid all non-essential travel including plane trips, and especially avoid embarking on cruise ships.\par -If COVID-19 is spreading in your community, take extra measures to put distance between yourself and other people to further reduce your risk of being exposed to this new virus.\par -Stay home as much as possible.\par - Consider ways of getting food brought to your house through family, social, or commercial networks\par -Be aware that the virus has been known to live in the air up to 3 hours post exposure, cardboard up to 24 hours post exposure, copper up to 4 hours post exposure, steel and plastic up to 2-3 days post exposure. Risk of transmission from these surfaces are affected by many variables.\par Immune Support Recommendations:\par -OTC Vitamin C 500mg BID \par -OTC Quercetin 250-500mg BID \par -OTC Zinc 75-100mg per day \par -OTC Melatonin 1 or 2 mg a night \par -OTC Vitamin D 1-4000mg per day \par -OTC Tonic Water 8oz per day\par Asthma and COVID19:\par You need to make sure your asthma is under control. This often requires the use of inhaled corticosteroids (and sometimes oral corticosteroids). Inhaled corticosteroids do not likely reduce your immune system’s ability to fight infections, but oral corticosteroids may. It is important to use the steps above to protect yourself to limit your exposure to any respiratory virus. \par \par problem 10: health maintenance\par -s/p 2020 flu shot\par -s/p 1st Shingrix vaccine, s/p 2nd shot\par -recommended strep pneumonia vaccines: Prevnar-13 vaccine, followed by Pneumo vaccine 23 on year following\par -recommended early intervention for URIs\par -recommended osteoporosis evaluations\par -recommended early dermatological evaluations\par -recommended after the age of 50 to the age of 70, colonoscopy every 5 years\par \par F/U in 3 months with SPI and NiOx\par She is encouraged to call with any changes, concerns, or questions

## 2021-01-13 NOTE — HISTORY OF PRESENT ILLNESS
[FreeTextEntry1] : Ms. Kunz is a 55 year old female with a history of abnormal chest CT, severe persistent asthma, BOOP, cough, eosinophilic asthma/PNA, GERD, lung nodules, PND, and SOB and was presents today via video call  for a follow up. Her chief complaint is residual cough from Covid-19\par -she is currently recovering from covid-19 pneumonitis, and is s/p the monoclonal antibody infusion, and tolerated it well\par -she reports she has a residual cough and is unable to take a deep breath without coughing. She notes she has some back soreness secondary to the cough\par -she reports her heartburn is controlled with medication, but burps often\par -she notes she sleeps well and gets enough sleep\par -she states her senses of smell and taste are good \par -she notes she feels abnormally thirsty, and is drinking a large amount of water\par -she is currently taking via inhaler: asmanex, bevespi, and albuterol. She uses her nebulizers PRN: Xopenex, ipratropium bromide and budesonide\par -she denies any palpitations, chest pain, chest pressure, diarrhea, constipation, dysphagia, dizziness, sour taste in the mouth, leg swelling, leg pain, itchy eyes, itchy ears, heartburn, reflux, myalgias or arthralgias.

## 2021-01-13 NOTE — REVIEW OF SYSTEMS
[Negative] : Endocrine [Cough] : cough [Back Pain] : back pain [Chest Discomfort] : no chest discomfort [Itchy Eyes] : no itchy eyes [GERD] : no gerd [Diarrhea] : no diarrhea [Constipation] : no constipation [Dysphagia] : no dysphagia [Dizziness] : no dizziness

## 2021-01-13 NOTE — PROCEDURE
[FreeTextEntry1] : CXR reveals a normal sized heart; b/l peripheral opacities c/w known Covid infection\par \par CT (AUG.5.2020) IMPRESSION: fatty liver, normal appearance of the biliary tree. Pancreatic cyst measuring up to 6 mm. follow up MRI/MRCP in one year is suggested.

## 2021-01-13 NOTE — ADDENDUM
[FreeTextEntry1] : Documented by Dorian Schaefer acting as a scribe for Dr. Munir Malin on 01/13/2021.\par \par All medical record entries made by the Scribe were at my, Dr. Munir Malin's, direction and personally dictated by me on 01/13/2021. I have reviewed the chart and agree that the record accurately reflects my personal performance of the history, physical exam, assessment and plan. I have also personally directed, reviewed, and agree with the discharge instructions.

## 2021-01-20 ENCOUNTER — TRANSCRIPTION ENCOUNTER (OUTPATIENT)
Age: 56
End: 2021-01-20

## 2021-02-09 ENCOUNTER — APPOINTMENT (OUTPATIENT)
Dept: PULMONOLOGY | Facility: CLINIC | Age: 56
End: 2021-02-09
Payer: COMMERCIAL

## 2021-02-09 ENCOUNTER — NON-APPOINTMENT (OUTPATIENT)
Age: 56
End: 2021-02-09

## 2021-02-09 VITALS
DIASTOLIC BLOOD PRESSURE: 80 MMHG | WEIGHT: 163 LBS | HEIGHT: 62 IN | OXYGEN SATURATION: 98 % | BODY MASS INDEX: 30 KG/M2 | SYSTOLIC BLOOD PRESSURE: 120 MMHG | HEART RATE: 124 BPM | RESPIRATION RATE: 16 BRPM | TEMPERATURE: 97.3 F

## 2021-02-09 PROCEDURE — 99072 ADDL SUPL MATRL&STAF TM PHE: CPT

## 2021-02-09 PROCEDURE — 99214 OFFICE O/P EST MOD 30 MIN: CPT | Mod: 25

## 2021-02-09 PROCEDURE — 71046 X-RAY EXAM CHEST 2 VIEWS: CPT

## 2021-02-09 RX ORDER — ALBUTEROL SULFATE 90 UG/1
108 (90 BASE) AEROSOL, METERED RESPIRATORY (INHALATION)
Qty: 3 | Refills: 1 | Status: ACTIVE | COMMUNITY
Start: 2019-03-13 | End: 1900-01-01

## 2021-02-09 NOTE — PHYSICAL EXAM
[No Acute Distress] : no acute distress [Normal Oropharynx] : normal oropharynx [III] : Mallampati Class: III [No Neck Mass] : no neck mass [Normal Appearance] : normal appearance [Normal Rate/Rhythm] : normal rate/rhythm [Normal S1, S2] : normal s1, s2 [No Murmurs] : no murmurs [No Resp Distress] : no resp distress [Clear to Auscultation Bilaterally] : clear to auscultation bilaterally [No Abnormalities] : no abnormalities [Normal Gait] : normal gait [Benign] : benign [No Clubbing] : no clubbing [No Cyanosis] : no cyanosis [No Edema] : no edema [FROM] : FROM [Normal Color/ Pigmentation] : normal color/ pigmentation [No Focal Deficits] : no focal deficits [Oriented x3] : oriented x3 [Normal Affect] : normal affect [TextBox_68] : I:E ratio 1:3; clear  ; coughing upon expiration.

## 2021-02-09 NOTE — ASSESSMENT
[FreeTextEntry1] : Ms. Kunz is a 55 year old female with a history of chronic sinus disease, GERD, Hashimoto's thyroiditis, BOOP, IgG deficiency, recent iron deficient anemia, asthma (eosinophilic) coming in for pulmonary reevaluation. She is s/p mastectomy and s/p hospitalization for "expander" Staph infection. She is now recovered and s/p Taxol, Herceptin and Perjetta RT  for her breast cancer. She is now s/p radiation therapy 7/2018, and presents to the office for a pulmonary follow up s/p COVID-19 pneumonitis with residual parenchymal changes - ?Active BOOP\par \par problem 1a: original abnormal chest CT s/p VATS - c/w BOOP\par -BOOP, diagnosed by VATS in 2013 / 2019\par -restart prednisone 5mg QD for 1 month, then taper if able. 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 Proper dietary restraint is necessary specifically salt containing foods, if any reaction may occur should be reported. \par \par problem 1b: New abnormal CT (nodules) \par - inflammatory disease c/w BOOP (last: 6/2020, Next 12/2020) (Delay due to COVID) - CT f/u next few weeks. \par \par -CAT scans are the only radiological modality to identify abnormalities w/in the lungs with regards to nodules/masses/lymph nodes. Risks, benefits were reviewed in detail. The guidelines for abnormalities include follow up CT scans at various intervals which could range from 6 weeks to 1 year intervals. If there is a change for the worse then consideration for a biopsy will be considered if you are a candidate. Second opinion evaluation with a thoracic surgeon or an interventional radiologist could be offered. \par \par Problem 1C: Covid-19 Pneumonitis\par -s/p a course of Prednisone; 30 mg for 5 days, then 20 mg for 5 days, then 10 mg for 5 days (completed 1/20/2021) \par Information sheet given to the patient to be reviewed, this medication is never to be used without consulting the prescribing physician. Proper dietary restraint is necessary specifically salt containing foods, if any reaction may occur should be reported. \par \par -s/p monoclonal antibody infusions\par \par  COVID-19 Immune Support Recommendations:\par -OTC Vitamin C 500mg BID \par -OTC Quercetin 250-500mg BID \par -OTC Zinc 75-100mg per day \par -OTC Melatonin 1 or 2 mg a night \par -OTC Vitamin D 1-4000mg per day \par -OTC Tonic Water 8oz per day\par -Recommended to take the anti-inflammatory supplements: Liposomal Glutathione 500 mg BID ([employer]), Quercitin 1000 mg BID, SPM BID ([employer]), Berberine 1000 mg BID \par \par problem 2: BOOP - confirmed\par -diagnosed by right VATs \par -follow up chest CT last in 6/2020, next 12/2020 (delay due to COVID)\par -continue Zithromax 500 mg M/W/F (continue)\par \par \par Problem 2a: COVID-19 infection (resolved)\par - s/p dexamethasone \par -s/p set up MAB infusion (risk factors: immunosuppressed, on biologics, low WBC count 2.92, leukopenia) 1/1/2021\par \par Immune Support Recommendations:\par -OTC Vitamin C 40383rw BID \par -OTC Quercetin 1000mg BID \par -OTC Zinc 50-100mg per day \par -OTC Melatonin 1-3mg a night \par -OTC Vitamin D 2000mg per day  \par \par \par \par problem 3a: severe persistent asthma -(improved)\par -continue Bevespi 2 puffs\par -continue Asmanex 2 puffs BID\par -continue Singulair 10mg QHS\par -continue levalbuterol 0.31 by the nebulizer up to QID\par -followed by Atrovent by the nebulizer up to QID\par -followed by Pulmicort 0.5 by the nebulizer BID \par -Asthma is  believed to be caused by inherited (genetic) and environmental factor, but its exact cause is unknown. Asthma may be triggered by allergens, lung infections, or irritants in the air. Asthma triggers are different for each person\par -Inhaler technique reviewed as well as oral hygiene techniques reviewed with patient. Avoidance of cold air, extremes of temperature, rescue inhaler should be used before exercise. Order of medication reviewed with patient. Recommended use of a cool mist humidifier in the bedroom. \par \par problem 3b: eosinophilic  asthma\par -off Nucala ; Consider Fasenra or Dupixent. started Fasenra on 9/17/2020\par -The safety and efficacy of Nucala was established in three double-blind, randomized, placebo-controlled trials in patients with severe asthma. Compared to a placebo, patients with severe asthma receiving Nucala had fewer exacerbation requiring hospitalization and/or emergency department visits, and a longer time to first exacerbation.  In addition, patients with severe asthma receiving Nucala experienced greater reductions in their daily maintenance oral corticosteroid dose, while maintaining asthma control compared with patients receiving placebo. Treatment with Nucala did not result in a significant improvement in lung function, as measured by the volume of air exhaled by patients in one second. The most common side effects include: headache, injection site reactions, back pain, weakness, and fatigue; hypersensitivity reactions can occur within hours or days including swelling of the face, mouth, and tongue, fainting, dizziness, hives, breathing problems, and rash; herpes zoster infections have occurred. The drug is a monoclonal antibody that inhibits interleukin -5 which helps regular eosinophils, a type of white blood cell that contributes to asthma. The over-production of eosinophils can cause inflammation in the lungs, increasing the frequency of asthma attacks. Patients must also take other medications, including high dose inhaled corticosteroids and at least one additional asthma drug. \par \par problem 4: allergic rhinitis\par -recommended Xlear or nasal saline, Navage.\par -recommended Sinugator \par -Environmental measures for allergies were encouraged including mattress and pillow cover, air purifier, and environmental controls. \par \par problem 5: GERD/LPR\par -continue Pepcid 40 mg QHS \par - continue Omeprazole 20 mg or 40 mg before breakfast\par - Recommended Apple cider vineger tablets. \par -Rule of 2s: avoid eating too much, eating too late, eating too spicy, eating two hours before bed\par -Things to avoid including overeating, spicy foods, tight clothing, eating within three hours of bed, this list is not all inclusive. \par -For treatment of reflux, possible options discussed including diet control, H2 blockers, PPIs, as well as coating motility agents discussed as treatment options. Timing of meals and proximity of last meal to sleep were discussed. If symptoms persist, a formal gastrointestinal evaluation is needed. \par \par problem 6: sensory neuropathic cough (controlled)\par -off Amitriptyline 10 mg up to TID, QHS \par -continue Baclofen 10 mg pre-meal and QHS\par -continue Tessalon Perles 200 mg qHS\par -Sensory neuropathic cough is an etiology of cough that is often realized once someone has been ruled out for common disease such as: asthma, COPD, eosinophilic bronchitis, bronchiectasis, post nasal drip, and GERD. It sometimes develops following a URI, herpes zoster outbreak in pharynx or thyroid or cervical spine injury. However, many patients have no identifiable antecedent explanation. \par \par problem 7: r/o diaphragm dysfunction\par -fluoroscopy of the diaphragm was all normal\par \par problem 8: low immunity \par -continue to use Gammagard weekly \par -recommended early intervention  \par -s/p Prevnar 13 vaccine / Pneumovax\par \par problem 9: Health Maintenance/COVID19 Precautions:\par - Clean your hands often. Wash your hands often with soap and water for at least 20 seconds, especially after blowing your nose, coughing, or sneezing, or having been in a public place.\par - If soap and water are not available, use a hand  that contains at least 60% alcohol.\par - To the extent possible, avoid touching high-touch surfaces in public places - elevator buttons, door handles, handrails, handshaking with people, etc. Use a tissue or your sleeve to cover your hand or finger if you must touch something.\par - Wash your hands after touching surfaces in public places.\par - Avoid touching your face, nose, eyes, etc.\par - Clean and disinfect your home to remove germs: practice routine cleaning of frequently touched surfaces (for example: tables, doorknobs, light switches, handles, desks, toilets, faucets, sinks & cell phones)\par - Avoid crowds, especially in poorly ventilated spaces. Your risk of exposure to respiratory viruses like COVID-19 may increase in crowded, closed-in settings with little air circulation if there are people in the crowd who are sick. All patients are recommended to practice social distancing and stay at least 6 feet away from others.\par - Avoid all non-essential travel including plane trips, and especially avoid embarking on cruise ships.\par -If COVID-19 is spreading in your community, take extra measures to put distance between yourself and other people to further reduce your risk of being exposed to this new virus.\par -Stay home as much as possible.\par - Consider ways of getting food brought to your house through family, social, or commercial networks\par -Be aware that the virus has been known to live in the air up to 3 hours post exposure, cardboard up to 24 hours post exposure, copper up to 4 hours post exposure, steel and plastic up to 2-3 days post exposure. Risk of transmission from these surfaces are affected by many variables.\par Immune Support Recommendations:\par -OTC Vitamin C 500mg BID \par -OTC Quercetin 250-500mg BID \par -OTC Zinc 75-100mg per day \par -OTC Melatonin 1 or 2 mg a night \par -OTC Vitamin D 1-4000mg per day \par -OTC Tonic Water 8oz per day\par Asthma and COVID19:\par You need to make sure your asthma is under control. This often requires the use of inhaled corticosteroids (and sometimes oral corticosteroids). Inhaled corticosteroids do not likely reduce your immune system’s ability to fight infections, but oral corticosteroids may. It is important to use the steps above to protect yourself to limit your exposure to any respiratory virus. \par \par problem 10: health maintenance\par -s/p 2020 flu shot\par -s/p 1st Shingrix vaccine, s/p 2nd shot\par -recommended strep pneumonia vaccines: Prevnar-13 vaccine, followed by Pneumo vaccine 23 on year following\par -recommended early intervention for URIs\par -recommended osteoporosis evaluations\par -recommended early dermatological evaluations\par -recommended after the age of 50 to the age of 70, colonoscopy every 5 years\par \par F/U in 3 months with SPI and NiOx\par She is encouraged to call with any changes, concerns, or questions

## 2021-02-09 NOTE — HISTORY OF PRESENT ILLNESS
[FreeTextEntry1] : Ms. Kunz is a 55 year old female with a history of abnormal chest CT, severe persistent asthma, BOOP, cough, eosinophilic asthma/PNA, GERD, lung nodules, PND, and SOB and was presents today for a follow up. Her chief complaint is her cough. \par -she is off prednisone 10 days ago and since then she has been coughing. \par -she can not take a deep breath without coughing. \par -she reports slight headaches. \par -she notes that she does not get severe headaches she used to get a long time ago. \par -notes constipation \par -reports rapid breathing when she was sleeping. \par -denies palpitations. \par -reports eating well. \par -reports that her bowel movements are normal but she does sometimes needs to use stool softener for her constipation. \par -she has been using all her medication. \par -she reports that she is taking her cancer medications. \par \par -she denies any chest pain, chest pressure, diarrhea, dysphagia, dizziness, sour taste in the mouth, leg swelling, leg pain, itchy eyes, itchy ears.

## 2021-02-09 NOTE — PROCEDURE
[FreeTextEntry1] : CXR reveals normal sized heart, vague increased interstitial marking in the Left lower lung  in the lingular area, post op changes. \par

## 2021-02-09 NOTE — ADDENDUM
[FreeTextEntry1] : Documented by Rajendra Mckeon acting as a scribe for Dr. Munir Malin on 02/09/2021.\par \par All medical record entries made by the Scribe were at my, Dr. Munir Malin's, direction and personally dictated by me on 02/09/2021. I have reviewed the chart and agree that the record accurately reflects my personal performance of the history, physical exam, assessment and plan. I have also personally directed, reviewed, and agree with the discharge instructions.

## 2021-02-20 ENCOUNTER — APPOINTMENT (OUTPATIENT)
Dept: CT IMAGING | Facility: CLINIC | Age: 56
End: 2021-02-20
Payer: COMMERCIAL

## 2021-02-20 ENCOUNTER — RESULT REVIEW (OUTPATIENT)
Age: 56
End: 2021-02-20

## 2021-02-20 ENCOUNTER — OUTPATIENT (OUTPATIENT)
Dept: OUTPATIENT SERVICES | Facility: HOSPITAL | Age: 56
LOS: 1 days | End: 2021-02-20
Payer: COMMERCIAL

## 2021-02-20 DIAGNOSIS — Z98.1 ARTHRODESIS STATUS: Chronic | ICD-10-CM

## 2021-02-20 DIAGNOSIS — Z98.82 BREAST IMPLANT STATUS: Chronic | ICD-10-CM

## 2021-02-20 DIAGNOSIS — Z90.10 ACQUIRED ABSENCE OF UNSPECIFIED BREAST AND NIPPLE: Chronic | ICD-10-CM

## 2021-02-20 DIAGNOSIS — Z00.8 ENCOUNTER FOR OTHER GENERAL EXAMINATION: ICD-10-CM

## 2021-02-20 DIAGNOSIS — Z98.89 OTHER SPECIFIED POSTPROCEDURAL STATES: Chronic | ICD-10-CM

## 2021-02-20 DIAGNOSIS — M71.30 OTHER BURSAL CYST, UNSPECIFIED SITE: Chronic | ICD-10-CM

## 2021-02-20 DIAGNOSIS — R06.02 SHORTNESS OF BREATH: ICD-10-CM

## 2021-02-20 DIAGNOSIS — Z01.812 ENCOUNTER FOR PREPROCEDURAL LABORATORY EXAMINATION: ICD-10-CM

## 2021-02-20 PROCEDURE — 71250 CT THORAX DX C-: CPT | Mod: 26

## 2021-02-20 PROCEDURE — 71250 CT THORAX DX C-: CPT

## 2021-02-24 ENCOUNTER — NON-APPOINTMENT (OUTPATIENT)
Age: 56
End: 2021-02-24

## 2021-02-26 PROBLEM — Z01.812 PRE-PROCEDURAL LABORATORY EXAMINATION: Status: ACTIVE | Noted: 2021-02-26

## 2021-03-01 ENCOUNTER — NON-APPOINTMENT (OUTPATIENT)
Age: 56
End: 2021-03-01

## 2021-03-01 ENCOUNTER — APPOINTMENT (OUTPATIENT)
Dept: PULMONOLOGY | Facility: CLINIC | Age: 56
End: 2021-03-01
Payer: COMMERCIAL

## 2021-03-01 VITALS
BODY MASS INDEX: 30.91 KG/M2 | TEMPERATURE: 97.8 F | RESPIRATION RATE: 17 BRPM | WEIGHT: 168 LBS | HEART RATE: 122 BPM | OXYGEN SATURATION: 98 % | DIASTOLIC BLOOD PRESSURE: 80 MMHG | SYSTOLIC BLOOD PRESSURE: 110 MMHG | HEIGHT: 62 IN

## 2021-03-01 PROCEDURE — 99214 OFFICE O/P EST MOD 30 MIN: CPT | Mod: 25

## 2021-03-01 PROCEDURE — 94618 PULMONARY STRESS TESTING: CPT

## 2021-03-01 PROCEDURE — 99072 ADDL SUPL MATRL&STAF TM PHE: CPT

## 2021-03-01 PROCEDURE — 95012 NITRIC OXIDE EXP GAS DETER: CPT

## 2021-03-01 PROCEDURE — 94010 BREATHING CAPACITY TEST: CPT

## 2021-03-01 NOTE — ASSESSMENT
[FreeTextEntry1] : Ms. Kunz is a 55 year old female with a history of chronic sinus disease, GERD, Hashimoto's thyroiditis, BOOP, IgG deficiency, recent iron deficient anemia, asthma (eosinophilic) coming in for pulmonary reevaluation. She is s/p mastectomy and s/p hospitalization for "expander" Staph infection. She is now recovered and s/p Taxol, Herceptin and Perjetta RT  for her breast cancer. She is now s/p radiation therapy 7/2018, and presents to the office for a pulmonary follow up s/p COVID-19 pneumonitis with residual parenchymal changes - ?Active BOOP- on prednisone 5 mg, abnormal CT  (RLL nodule 1.5 mm)\par  \par problem 1a: original abnormal chest CT s/p VATS - c/w BOOP\par -BOOP, diagnosed by VATS in 2013 / 2019\par -restart prednisone 5mg QD for 1 month, then taper if able. 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 Proper dietary restraint is necessary specifically salt containing foods, if any reaction may occur should be reported. \par \par problem 1b: (+) abnormal CT (nodules) - enlarged RLL- ?EDWAR\par - inflammatory disease c/w BOOP (last:2/2021, next: 6/2021) (Delay due to COVID) - CT f/u next few weeks. \par -complete Sputum Screening for AFB x 3 \par -CAT scans are the only radiological modality to identify abnormalities w/in the lungs with regards to nodules/masses/lymph nodes. Risks, benefits were reviewed in detail. The guidelines for abnormalities include follow up CT scans at various intervals which could range from 6 weeks to 1 year intervals. If there is a change for the worse then consideration for a biopsy will be considered if you are a candidate. Second opinion evaluation with a thoracic surgeon or an interventional radiologist could be offered. \par \par Problem 1C: s/p Covid-19 Pneumonitis (12/2020) \par -s/p a course of Prednisone; 30 mg for 5 days, then 20 mg for 5 days, then 10 mg for 5 days (completed 1/20/2021) \par Information sheet given to the patient to be reviewed, this medication is never to be used without consulting the prescribing physician. Proper dietary restraint is necessary specifically salt containing foods, if any reaction may occur should be reported. \par \par -s/p monoclonal antibody infusions\par -hold vaccine until 5/2021\par \par COVID-19 precautionary Immune Support Recommendations:\par -OTC Vitamin C 1000mg BID \par -OTC Quercetin 500mg BID \par -OTC Zinc 50 mg per day \par -OTC Melatonin 5 mg a night \par -OTC Vitamin D 2000mg per day \par -OTC Tonic Water 8oz per day\par -Water 0.5-1 gallon per day\par \par Additional Support Supplements: \par -Liposomal Glutathione 500 mg BID\par -SPM 1 tablet BID \par -Berberine 1000 mg BID  \par \par problem 2: BOOP - confirmed\par -diagnosed by right VATs \par -follow up chest CT (last 2/2021, next 6/2021) \par -continue Zithromax 500 mg M/W/F (continue)\par \par Problem 2a: COVID-19 infection (resolved)\par - s/p dexamethasone \par -s/p set up MAB infusion (risk factors: immunosuppressed, on biologics, low WBC count 2.92, leukopenia) 1/1/2021\par \par COVID-19 precautionary Immune Support Recommendations:\par -OTC Vitamin C 1000mg BID \par -OTC Quercetin 500mg BID \par -OTC Zinc 50 mg per day \par -OTC Melatonin 5 mg a night \par -OTC Vitamin D 2000mg per day \par -OTC Tonic Water 8oz per day\par -Water 0.5-1 gallon per day\par \par Additional Support Supplements: \par -Liposomal Glutathione 500 mg BID\par -SPM 1 tablet BID \par -Berberine 1000 mg BID  \par \par problem 3a: severe persistent asthma -(improved)\par -continue Bevespi 2 puffs\par -continue Asmanex 2 puffs BID\par -continue Singulair 10mg QHS\par -continue levalbuterol 0.31 by the nebulizer up to QID\par -followed by Atrovent by the nebulizer up to QID\par -followed by Pulmicort 0.5 by the nebulizer BID \par -Asthma is  believed to be caused by inherited (genetic) and environmental factor, but its exact cause is unknown. Asthma may be triggered by allergens, lung infections, or irritants in the air. Asthma triggers are different for each person\par -Inhaler technique reviewed as well as oral hygiene techniques reviewed with patient. Avoidance of cold air, extremes of temperature, rescue inhaler should be used before exercise. Order of medication reviewed with patient. Recommended use of a cool mist humidifier in the bedroom. \par \par problem 3b: eosinophilic  asthma\par -off Nucala ; Consider Fasenra or Dupixent. started Fasenra on 9/17/2020\par -The safety and efficacy of Nucala was established in three double-blind, randomized, placebo-controlled trials in patients with severe asthma. Compared to a placebo, patients with severe asthma receiving Nucala had fewer exacerbation requiring hospitalization and/or emergency department visits, and a longer time to first exacerbation.  In addition, patients with severe asthma receiving Nucala experienced greater reductions in their daily maintenance oral corticosteroid dose, while maintaining asthma control compared with patients receiving placebo. Treatment with Nucala did not result in a significant improvement in lung function, as measured by the volume of air exhaled by patients in one second. The most common side effects include: headache, injection site reactions, back pain, weakness, and fatigue; hypersensitivity reactions can occur within hours or days including swelling of the face, mouth, and tongue, fainting, dizziness, hives, breathing problems, and rash; herpes zoster infections have occurred. The drug is a monoclonal antibody that inhibits interleukin -5 which helps regular eosinophils, a type of white blood cell that contributes to asthma. The over-production of eosinophils can cause inflammation in the lungs, increasing the frequency of asthma attacks. Patients must also take other medications, including high dose inhaled corticosteroids and at least one additional asthma drug. \par \par ******************************************************Pre-op Clearance*************************************************************** \par  \par Problem 3C: Pre OP Clearance\par -at this point in time there are no absolute pulmonary contraindications to go forward with the planned procedure \par -at the time of surgery s/he should have optimal pain control, incentive spirometry, early ambulation, DVT and GI prophylaxis.\par  \par problem 4: allergic rhinitis\par -recommended Xlear or nasal saline, Navage.\par -recommended Sinugator \par -Environmental measures for allergies were encouraged including mattress and pillow cover, air purifier, and environmental controls. \par \par problem 5: GERD/LPR\par -continue Pepcid 40 mg QHS \par - continue Omeprazole 20 mg or 40 mg before breakfast\par - Recommended Apple cider vineger tablets. \par -Rule of 2s: avoid eating too much, eating too late, eating too spicy, eating two hours before bed\par -Things to avoid including overeating, spicy foods, tight clothing, eating within three hours of bed, this list is not all inclusive. \par -For treatment of reflux, possible options discussed including diet control, H2 blockers, PPIs, as well as coating motility agents discussed as treatment options. Timing of meals and proximity of last meal to sleep were discussed. If symptoms persist, a formal gastrointestinal evaluation is needed. \par \par problem 6: sensory neuropathic cough (controlled)\par -off Amitriptyline 10 mg up to TID, QHS \par -continue Baclofen 10 mg pre-meal and QHS\par -continue Tessalon Perles 200 mg qHS\par -Sensory neuropathic cough is an etiology of cough that is often realized once someone has been ruled out for common disease such as: asthma, COPD, eosinophilic bronchitis, bronchiectasis, post nasal drip, and GERD. It sometimes develops following a URI, herpes zoster outbreak in pharynx or thyroid or cervical spine injury. However, many patients have no identifiable antecedent explanation. \par \par problem 7: r/o diaphragm dysfunction\par -fluoroscopy of the diaphragm was all normal\par \par problem 8: low immunity \par -continue to use Gammagard weekly \par -recommended early intervention  \par -s/p Prevnar 13 vaccine / Pneumovax\par \par problem 9: Health Maintenance/COVID19 Precautions:\par - Clean your hands often. Wash your hands often with soap and water for at least 20 seconds, especially after blowing your nose, coughing, or sneezing, or having been in a public place.\par - If soap and water are not available, use a hand  that contains at least 60% alcohol.\par - To the extent possible, avoid touching high-touch surfaces in public places - elevator buttons, door handles, handrails, handshaking with people, etc. Use a tissue or your sleeve to cover your hand or finger if you must touch something.\par - Wash your hands after touching surfaces in public places.\par - Avoid touching your face, nose, eyes, etc.\par - Clean and disinfect your home to remove germs: practice routine cleaning of frequently touched surfaces (for example: tables, doorknobs, light switches, handles, desks, toilets, faucets, sinks & cell phones)\par - Avoid crowds, especially in poorly ventilated spaces. Your risk of exposure to respiratory viruses like COVID-19 may increase in crowded, closed-in settings with little air circulation if there are people in the crowd who are sick. All patients are recommended to practice social distancing and stay at least 6 feet away from others.\par - Avoid all non-essential travel including plane trips, and especially avoid embarking on cruise ships.\par -If COVID-19 is spreading in your community, take extra measures to put distance between yourself and other people to further reduce your risk of being exposed to this new virus.\par -Stay home as much as possible.\par - Consider ways of getting food brought to your house through family, social, or commercial networks\par -Be aware that the virus has been known to live in the air up to 3 hours post exposure, cardboard up to 24 hours post exposure, copper up to 4 hours post exposure, steel and plastic up to 2-3 days post exposure. Risk of transmission from these surfaces are affected by many variables.\par Immune Support Recommendations:\par -OTC Vitamin C 500mg BID \par -OTC Quercetin 250-500mg BID \par -OTC Zinc 75-100mg per day \par -OTC Melatonin 1 or 2 mg a night \par -OTC Vitamin D 1-4000mg per day \par -OTC Tonic Water 8oz per day\par Asthma and COVID19:\par You need to make sure your asthma is under control. This often requires the use of inhaled corticosteroids (and sometimes oral corticosteroids). Inhaled corticosteroids do not likely reduce your immune system’s ability to fight infections, but oral corticosteroids may. It is important to use the steps above to protect yourself to limit your exposure to any respiratory virus. \par \par problem 10: health maintenance\par -s/p 2020 flu shot\par -s/p 1st Shingrix vaccine, s/p 2nd shot\par -recommended strep pneumonia vaccines: Prevnar-13 vaccine, followed by Pneumo vaccine 23 on year following\par -recommended early intervention for URIs\par -recommended osteoporosis evaluations\par -recommended early dermatological evaluations\par -recommended after the age of 50 to the age of 70, colonoscopy every 5 years\par \par F/U in 3 months with SPI and NiOx\par She is encouraged to call with any changes, concerns, or questions

## 2021-03-01 NOTE — PROCEDURE
[FreeTextEntry1] : CT Chest (2.20.2021) reveals multiple bilateral airway centered nodules are again identified, some of which have decreased in size in some of which is slightly increased, as mentioned above. No consolidations, edema, effusion or pneumothorax.\par \par 6 minute walk test reveals a low saturation of 94 % with no evidence of dyspnea or fatigue; walked   391.2   meters.\par \par FENO was 17; a normal value being less than 25\par Fractional exhaled nitric oxide (FENO) is regarded as a simple, noninvasive method for assessing eosinophilic airway inflammation. Produced by a variety of cells within the lung, nitric oxide (NO) concentrations are generally low in healthy individuals. However, high concentrations of NO appear to be involved in nonspecific host defense mechanisms and chronic inflammatory diseases such as asthma. The American Thoracic Society (ATS) therefore has recommended using FENO to aid in the diagnosis and monitoring of eosinophilic airway inflammation and asthma, and for identifying steroid responsive individuals whose chronic respiratory symptoms may be caused by airway inflammation. \par -Images and procedures reviewed in detail and discussed with patient. \par \par PFT revealed mild restrictive dysfunction, with a FEV1 of 1.90 L, which is 73 % of predicted, normal lung volumes, and with a normal flow volume loop.\par \par -Images and procedures reviewed in detail and discussed with patient.

## 2021-03-01 NOTE — REASON FOR VISIT
[Follow-Up] : a follow-up visit [FreeTextEntry1] : s/p COVID-19 infection 12/2020, abnormal chest CT, severe persistent asthma, BOOP, cough, eosinophilic asthma/PNA, GERD, lung nodules, PND, and SOB

## 2021-03-01 NOTE — HISTORY OF PRESENT ILLNESS
[FreeTextEntry1] : Ms. Kunz is a 55 year old female with a history of abnormal chest CT, severe persistent asthma, BOOP, cough, eosinophilic asthma/PNA, GERD, lung nodules, PND, and SOB and was presents today for a follow up. Her chief complaint is \par \par -she notes cough controlled with compliance to 5 mg prednisone\par -she notes mild intermittent PND, worse upon awakening\par -she notes intermittent chest tightness compliant with nebulizer to control \par -she notes heartburn controlled with Pepcid\par -she notes appetite and diet improved\par -she notes intermittent exercise\par \par \par -denies any chest pain, chest pressure, diarrhea, constipation, dysphagia, sour taste in the mouth, dizziness, leg swelling, leg pain, myalgias, arthralgias, itchy eyes, itchy ears, heartburn, or reflux.\par \par

## 2021-03-01 NOTE — ADDENDUM
[FreeTextEntry1] : Documented by Felice Bergeron acting as a scribe for Dr. Munir Malin on 03/01/2021.\par \par All medical record entries made by the Scribe were at my, Dr. Munir Malin's, direction and personally dictated by me on 03/01/2021 . I have reviewed the chart and agree that the record accurately reflects my personal performance of the history, physical exam, assessment and plan. I have also personally directed, reviewed, and agree with the discharge instructions. \par

## 2021-03-21 ENCOUNTER — LABORATORY RESULT (OUTPATIENT)
Age: 56
End: 2021-03-21

## 2021-03-23 ENCOUNTER — RX RENEWAL (OUTPATIENT)
Age: 56
End: 2021-03-23

## 2021-03-27 ENCOUNTER — RX RENEWAL (OUTPATIENT)
Age: 56
End: 2021-03-27

## 2021-03-31 ENCOUNTER — LABORATORY RESULT (OUTPATIENT)
Age: 56
End: 2021-03-31

## 2021-04-15 ENCOUNTER — RX RENEWAL (OUTPATIENT)
Age: 56
End: 2021-04-15

## 2021-04-28 LAB — RHODAMINE-AURAMINE STN SPEC: NORMAL

## 2021-05-14 ENCOUNTER — TRANSCRIPTION ENCOUNTER (OUTPATIENT)
Age: 56
End: 2021-05-14

## 2021-05-25 ENCOUNTER — NON-APPOINTMENT (OUTPATIENT)
Age: 56
End: 2021-05-25

## 2021-05-25 ENCOUNTER — TRANSCRIPTION ENCOUNTER (OUTPATIENT)
Age: 56
End: 2021-05-25

## 2021-06-01 ENCOUNTER — APPOINTMENT (OUTPATIENT)
Dept: PULMONOLOGY | Facility: CLINIC | Age: 56
End: 2021-06-01
Payer: COMMERCIAL

## 2021-06-01 ENCOUNTER — NON-APPOINTMENT (OUTPATIENT)
Age: 56
End: 2021-06-01

## 2021-06-01 VITALS
HEIGHT: 62 IN | SYSTOLIC BLOOD PRESSURE: 110 MMHG | DIASTOLIC BLOOD PRESSURE: 80 MMHG | HEART RATE: 106 BPM | RESPIRATION RATE: 16 BRPM | OXYGEN SATURATION: 97 % | WEIGHT: 168 LBS | TEMPERATURE: 97 F | BODY MASS INDEX: 30.91 KG/M2

## 2021-06-01 PROCEDURE — 99214 OFFICE O/P EST MOD 30 MIN: CPT | Mod: 25

## 2021-06-01 PROCEDURE — 94010 BREATHING CAPACITY TEST: CPT

## 2021-06-01 PROCEDURE — 99072 ADDL SUPL MATRL&STAF TM PHE: CPT

## 2021-06-01 PROCEDURE — 95012 NITRIC OXIDE EXP GAS DETER: CPT

## 2021-06-01 NOTE — PHYSICAL EXAM
[No Acute Distress] : no acute distress [Normal Oropharynx] : normal oropharynx [Normal Appearance] : normal appearance [No Neck Mass] : no neck mass [Normal Rate/Rhythm] : normal rate/rhythm [Normal S1, S2] : normal s1, s2 [No Murmurs] : no murmurs [No Resp Distress] : no resp distress [Clear to Auscultation Bilaterally] : clear to auscultation bilaterally [No Abnormalities] : no abnormalities [Benign] : benign [Normal Gait] : normal gait [No Clubbing] : no clubbing [No Cyanosis] : no cyanosis [No Edema] : no edema [FROM] : FROM [Normal Color/ Pigmentation] : normal color/ pigmentation [No Focal Deficits] : no focal deficits [Oriented x3] : oriented x3 [Normal Affect] : normal affect [III] : Mallampati Class: III [TextBox_2] : OW [TextBox_68] : I:E ratio 1:3; clear

## 2021-06-01 NOTE — PROCEDURE
[Home] : at home, [unfilled] , at the time of the visit. [Other Location: e.g. Home (Enter Location, City,State)___] : at [unfilled] [Patient] : the patient [Spouse] : spouse [FreeTextEntry1] : Acne and toenail fungus [de-identified] : Follow-up telehealth visit for 61 year old white female with a history of 'acne' on the face and buttocks.  Treated with 2% erythromycin solution with adequate control.\par Patient also has a history of 'toenail fungus' on both great toenails and left fifth toenail.  Treated with ciclopirox cream 0.77%.  Has run out of this.\par \par Verbal consent obtained.\par \par This was a Telehealth encounter via [employer]box in which two-way real-time audio and visual communication was utilized.  Risks and benefits of receiving Telehealth services has been discussed with the patient.   The patient has been given ample opportunity to discuss any questions regarding [facility] telehealth services.  All of the patient's questions have been answered to satisfaction.\par  [FreeTextEntry1] : FENO was 10; a normal value being less than 25\par Fractional exhaled nitric oxide (FENO) is regarded as a simple, noninvasive method for assessing eosinophilic airway inflammation. Produced by a variety of cells within the lung, nitric oxide (NO) concentrations are generally low in healthy individuals. However, high concentrations of NO appear to be involved in nonspecific host defense mechanisms and chronic inflammatory diseases such as asthma. The American Thoracic Society (ATS) therefore has recommended using FENO to aid in the diagnosis and monitoring of eosinophilic airway inflammation and asthma, and for identifying steroid responsive individuals whose chronic respiratory symptoms may be caused by airway inflammation. \par \par PFT revealed moderate restrictive dysfunction, with a FEV1 of 1.53 L, which is 59% of predicted, with a normal flow volume loop.\par

## 2021-06-01 NOTE — ASSESSMENT
[FreeTextEntry1] : Ms. Kunz is a 56 year old female with a history of chronic sinus disease, GERD, Hashimoto's thyroiditis, BOOP, IgG deficiency, recent iron deficient anemia, asthma (eosinophilic) coming in for pulmonary reevaluation. She is s/p mastectomy and s/p hospitalization for "expander" Staph infection. She is now recovered and s/p Taxol, Herceptin and Perjetta RT  for her breast cancer. 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 Proper dietary restraint is necessary specifically salt containing foods, if any reaction may occur should be reported. \par \par problem 1b: (+) abnormal CT (nodules) - enlarged RLL- ?EDWAR\par - inflammatory disease c/w BOOP (last:2/2021, next: 6/2021) (Delay due to COVID) - CT f/u next few weeks. (6/2021)\par -s/p Sputum Screening for AFB x 3 \par -CAT scans are the only radiological modality to identify abnormalities w/in the lungs with regards to nodules/masses/lymph nodes. Risks, benefits were reviewed in detail. The guidelines for abnormalities include follow up CT scans at various intervals which could range from 6 weeks to 1 year intervals. If there is a change for the worse then consideration for a biopsy will be considered if you are a candidate. Second opinion evaluation with a thoracic surgeon or an interventional radiologist could be offered. \par \par Problem 1C: s/p Covid-19 Pneumonitis (12/2020) - resolved\par -s/p a course of Prednisone; 30 mg for 5 days, then 20 mg for 5 days, then 10 mg for 5 days (completed 1/20/2021) \par Information sheet given to the patient to be reviewed, this medication is never to be used without consulting the prescribing physician. Proper dietary restraint is necessary specifically salt containing foods, if any reaction may occur should be reported. \par \par -s/p monoclonal antibody infusions\par -hold vaccine until 5/2021\par \par COVID-19 precautionary Immune Support Recommendations:\par -OTC Vitamin C 1000mg BID \par -OTC Quercetin 500mg BID \par -OTC Zinc 50 mg per day \par -OTC Melatonin 5 mg a night \par -OTC Vitamin D 2000mg per day \par -OTC Tonic Water 8oz per day\par -Water 0.5-1 gallon per day\par \par Additional Support Supplements: \par -Liposomal Glutathione 500 mg BID\par -SPM 1 tablet BID \par -Berberine 1000 mg BID  \par \par problem 2: BOOP - confirmed\par -diagnosed by right VATs \par -follow up chest CT (last 2/2021, next 6/2021) \par -continue Zithromax 500 mg M/W/F (continue)\par \par Problem 2a: COVID-19 infection (resolved)\par - s/p dexamethasone \par -s/p set up MAB infusion (risk factors: immunosuppressed, on biologics, low WBC count 2.92, leukopenia) 1/1/2021\par \par COVID-19 precautionary Immune Support Recommendations:\par -OTC Vitamin C 1000mg BID \par -OTC Quercetin 500mg BID \par -OTC Zinc 50 mg per day \par -OTC Melatonin 5 mg a night \par -OTC Vitamin D 2000mg per day \par -OTC Tonic Water 8oz per day\par -Water 0.5-1 gallon per day\par \par Additional Support Supplements: \par -Liposomal Glutathione 500 mg BID\par -SPM 1 tablet BID \par -Berberine 1000 mg BID  \par \par problem 3a: severe persistent asthma -(active) \par -continue Bevespi 2 puffs\par -off Asmanex 200 2 puffs BID move to Arnuity 200 1 inhalation QD\par -continue Singulair 10mg QHS\par -continue levalbuterol 0.31 by the nebulizer up to QID\par -followed by Atrovent by the nebulizer up to QID\par -followed by Pulmicort 0.5 by the nebulizer BID \par -Asthma is  believed to be caused by inherited (genetic) and environmental factor, but its exact cause is unknown. Asthma may be triggered by allergens, lung infections, or irritants in the air. Asthma triggers are different for each person\par -Inhaler technique reviewed as well as oral hygiene techniques reviewed with patient. Avoidance of cold air, extremes of temperature, rescue inhaler should be used before exercise. Order of medication reviewed with patient. Recommended use of a cool mist humidifier in the bedroom. \par \par problem 3b: eosinophilic  asthma\par -off Nucala ; Consider Fasenra or Dupixent. started Fasenra on 9/17/2020 \par -continue until set up Dupixent\par -The safety and efficacy of Nucala was established in three double-blind, randomized, placebo-controlled trials in patients with severe asthma. Compared to a placebo, patients with severe asthma receiving Nucala had fewer exacerbation requiring hospitalization and/or emergency department visits, and a longer time to first exacerbation.  In addition, patients with severe asthma receiving Nucala experienced greater reductions in their daily maintenance oral corticosteroid dose, while maintaining asthma control compared with patients receiving placebo. Treatment with Nucala did not result in a significant improvement in lung function, as measured by the volume of air exhaled by patients in one second. The most common side effects include: headache, injection site reactions, back pain, weakness, and fatigue; hypersensitivity reactions can occur within hours or days including swelling of the face, mouth, and tongue, fainting, dizziness, hives, breathing problems, and rash; herpes zoster infections have occurred. The drug is a monoclonal antibody that inhibits interleukin -5 which helps regular eosinophils, a type of white blood cell that contributes to asthma. The over-production of eosinophils can cause inflammation in the lungs, increasing the frequency of asthma attacks. Patients must also take other medications, including high dose inhaled corticosteroids and at least one additional asthma drug. \par  \par problem 3C: steroid Dependent Asthma\par -set up Dupixent\par Dupixent is a prescription medicine used with other asthma medicines for the maintenance treatment of moderate-to-severe asthma in people aged 12 years and older whose asthma is not controlled with their current asthma medicines. Dupixent helps prevent severe asthma attacks (exacerbations) and can improve your breathing. Dupixent may also help reduce the amount of oral corticosteroids you need while preventing severe asthma attacks and improving your breathing. Dupixent is not used to treat sudden breathing problems. Risks and side effect of Dupixent were discussed and reviewed with patient.\par \par problem 4: allergic rhinitis\par -recommended Xlear or nasal saline, Navage.\par -recommended Sinugator \par -Environmental measures for allergies were encouraged including mattress and pillow cover, air purifier, and environmental controls. \par \par problem 5: GERD/LPR\par -continue Pepcid 40 mg QHS \par - continue Omeprazole 20 mg or 40 mg before breakfast\par - Recommended Apple cider vineger tablets. \par -Rule of 2s: avoid eating too much, eating too late, eating too spicy, eating two hours before bed\par -Things to avoid including overeating, spicy foods, tight clothing, eating within three hours of bed, this list is not all inclusive. \par -For treatment of reflux, possible options discussed including diet control, H2 blockers, PPIs, as well as coating motility agents discussed as treatment options. Timing of meals and proximity of last meal to sleep were discussed. If symptoms persist, a formal gastrointestinal evaluation is needed. \par \par problem 6: sensory neuropathic cough (controlled)\par -off Amitriptyline 10 mg up to TID, QHS \par -continue Baclofen 10 mg pre-meal and QHS\par -continue Tessalon Perles 200 mg qHS\par -Sensory neuropathic cough is an etiology of cough that is often realized once someone has been ruled out for common disease such as: asthma, COPD, eosinophilic bronchitis, bronchiectasis, post nasal drip, and GERD. It sometimes develops following a URI, herpes zoster outbreak in pharynx or thyroid or cervical spine injury. However, many patients have no identifiable antecedent explanation. \par \par problem 7: r/o diaphragm dysfunction\par -fluoroscopy of the diaphragm was all normal\par \par problem 8: low immunity \par -continue to use Gammagard weekly \par -recommended early intervention  \par -s/p Prevnar 13 vaccine / Pneumovax\par \par problem 9: Health Maintenance/COVID19 Precautions:\par - Clean your hands often. Wash your hands often with soap and water for at least 20 seconds, especially after blowing your nose, coughing, or sneezing, or having been in a public place.\par - If soap and water are not available, use a hand  that contains at least 60% alcohol.\par - To the extent possible, avoid touching high-touch surfaces in public places - elevator buttons, door handles, handrails, handshaking with people, etc. Use a tissue or your sleeve to cover your hand or finger if you must touch something.\par - Wash your hands after touching surfaces in public places.\par - Avoid touching your face, nose, eyes, etc.\par - Clean and disinfect your home to remove germs: practice routine cleaning of frequently touched surfaces (for example: tables, doorknobs, light switches, handles, desks, toilets, faucets, sinks & cell phones)\par - Avoid crowds, especially in poorly ventilated spaces. Your risk of exposure to respiratory viruses like COVID-19 may increase in crowded, closed-in settings with little air circulation if there are people in the crowd who are sick. All patients are recommended to practice social distancing and stay at least 6 feet away from others.\par - Avoid all non-essential travel including plane trips, and especially avoid embarking on cruise ships.\par -If COVID-19 is spreading in your community, take extra measures to put distance between yourself and other people to further reduce your risk of being exposed to this new virus.\par -Stay home as much as possible.\par - Consider ways of getting food brought to your house through family, social, or commercial networks\par -Be aware that the virus has been known to live in the air up to 3 hours post exposure, cardboard up to 24 hours post exposure, copper up to 4 hours post exposure, steel and plastic up to 2-3 days post exposure. Risk of transmission from these surfaces are affected by many variables.\par Immune Support Recommendations:\par -OTC Vitamin C 500mg BID \par -OTC Quercetin 250-500mg BID \par -OTC Zinc 75-100mg per day \par -OTC Melatonin 1 or 2 mg a night \par -OTC Vitamin D 1-4000mg per day \par -OTC Tonic Water 8oz per day\par Asthma and COVID19:\par You need to make sure your asthma is under control. This often requires the use of inhaled corticosteroids (and sometimes oral corticosteroids). Inhaled corticosteroids do not likely reduce your immune system’s ability to fight infections, but oral corticosteroids may. It is important to use the steps above to protect yourself to limit your exposure to any respiratory virus. \par \par problem 10: health maintenance\par -s/p 2020 flu shot\par -s/p 1st Shingrix vaccine, s/p 2nd shot\par -recommended strep pneumonia vaccines: Prevnar-13 vaccine, followed by Pneumo vaccine 23 on year following\par -recommended early intervention for URIs\par -recommended osteoporosis evaluations\par -recommended early dermatological evaluations\par -recommended after the age of 50 to the age of 70, colonoscopy every 5 years\par \par F/U in 3 months with SPI and NiOx\par She is encouraged to call with any changes, concerns, or questions

## 2021-06-01 NOTE — ADDENDUM
[FreeTextEntry1] : Documented by Felice Bergeron acting as a scribe for Dr. Munir Malin on 06/01/2021.\par \par All medical record entries made by the Scribe were at my, Dr. Munir Malin's, direction and personally dictated by me on 06/01/2021 . I have reviewed the chart and agree that the record accurately reflects my personal performance of the history, physical exam, assessment and plan. I have also personally directed, reviewed, and agree with the discharge instructions. \par

## 2021-06-20 ENCOUNTER — RX RENEWAL (OUTPATIENT)
Age: 56
End: 2021-06-20

## 2021-06-30 ENCOUNTER — RX RENEWAL (OUTPATIENT)
Age: 56
End: 2021-06-30

## 2021-06-30 RX ORDER — DUPILUMAB 300 MG/2ML
300 INJECTION, SOLUTION SUBCUTANEOUS
Qty: 4 | Refills: 2 | Status: ACTIVE | COMMUNITY
Start: 2021-06-18 | End: 1900-01-01

## 2021-07-09 ENCOUNTER — NON-APPOINTMENT (OUTPATIENT)
Age: 56
End: 2021-07-09

## 2021-07-16 ENCOUNTER — APPOINTMENT (OUTPATIENT)
Dept: PULMONOLOGY | Facility: CLINIC | Age: 56
End: 2021-07-16
Payer: COMMERCIAL

## 2021-07-16 VITALS
HEIGHT: 62 IN | BODY MASS INDEX: 32.02 KG/M2 | DIASTOLIC BLOOD PRESSURE: 80 MMHG | WEIGHT: 174 LBS | TEMPERATURE: 97.5 F | RESPIRATION RATE: 16 BRPM | HEART RATE: 113 BPM | SYSTOLIC BLOOD PRESSURE: 120 MMHG | OXYGEN SATURATION: 97 %

## 2021-07-16 PROCEDURE — 99072 ADDL SUPL MATRL&STAF TM PHE: CPT

## 2021-07-16 PROCEDURE — 99211 OFF/OP EST MAY X REQ PHY/QHP: CPT

## 2021-07-31 ENCOUNTER — RX RENEWAL (OUTPATIENT)
Age: 56
End: 2021-07-31

## 2021-08-02 ENCOUNTER — NON-APPOINTMENT (OUTPATIENT)
Age: 56
End: 2021-08-02

## 2021-08-03 ENCOUNTER — RX RENEWAL (OUTPATIENT)
Age: 56
End: 2021-08-03

## 2021-08-09 ENCOUNTER — APPOINTMENT (OUTPATIENT)
Dept: OBGYN | Facility: CLINIC | Age: 56
End: 2021-08-09
Payer: COMMERCIAL

## 2021-08-09 VITALS
WEIGHT: 172 LBS | OXYGEN SATURATION: 97 % | SYSTOLIC BLOOD PRESSURE: 112 MMHG | DIASTOLIC BLOOD PRESSURE: 72 MMHG | TEMPERATURE: 97.3 F | HEIGHT: 62 IN | HEART RATE: 83 BPM | BODY MASS INDEX: 31.65 KG/M2

## 2021-08-09 PROCEDURE — 99396 PREV VISIT EST AGE 40-64: CPT

## 2021-08-09 NOTE — PHYSICAL EXAM
[Appropriately responsive] : appropriately responsive [Alert] : alert [No Acute Distress] : no acute distress [Non-tender] : non-tender [No Lesions] : no lesions [No Mass] : no mass [Oriented x3] : oriented x3 [Examination Of The Breasts] : a normal appearance [No Discharge] : no discharge [No Masses] : no breast masses were palpable [Labia Majora] : normal [Labia Minora] : normal [No Bleeding] : There was no active vaginal bleeding [Soft] : soft [Normal] : normal [Normal Position] : in a normal position [Uterine Adnexae] : normal [Right Breast Absent] : a total mastectomy [Breast Reconstruction Right] : breast reconstruction [Left Breast Absent] : a total mastectomy [Breast Reconstruction Left] : breast reconstruction [Tenderness] : nontender [Enlarged ___ wks] : not enlarged [Mass ___ cm] : no uterine mass was palpated [FreeTextEntry9] : Last colonoscopy 7/2021

## 2021-08-09 NOTE — HISTORY OF PRESENT ILLNESS
[Y] : Patient is sexually active [Monogamous (Male Partner)] : is monogamous with a male partner [LMPDate] : age 51 [facility] [[facility]iving[facility] : 3 [FreeTextEntry1] :  X 3

## 2021-08-11 ENCOUNTER — NON-APPOINTMENT (OUTPATIENT)
Age: 56
End: 2021-08-11

## 2021-08-13 ENCOUNTER — RX RENEWAL (OUTPATIENT)
Age: 56
End: 2021-08-13

## 2021-08-13 RX ORDER — IPRATROPIUM BROMIDE 0.5 MG/2.5ML
0.02 SOLUTION RESPIRATORY (INHALATION)
Qty: 312.5 | Refills: 1 | Status: ACTIVE | COMMUNITY
Start: 2019-06-13 | End: 1900-01-01

## 2021-08-14 LAB
CYTOLOGY CVX/VAG DOC THIN PREP: ABNORMAL
HPV HIGH+LOW RISK DNA PNL CVX: NOT DETECTED

## 2021-08-16 ENCOUNTER — RX RENEWAL (OUTPATIENT)
Age: 56
End: 2021-08-16

## 2021-08-17 ENCOUNTER — NON-APPOINTMENT (OUTPATIENT)
Age: 56
End: 2021-08-17

## 2021-08-19 DIAGNOSIS — Z91.89 OTHER SPECIFIED PERSONAL RISK FACTORS, NOT ELSEWHERE CLASSIFIED: ICD-10-CM

## 2021-08-25 ENCOUNTER — RX RENEWAL (OUTPATIENT)
Age: 56
End: 2021-08-25

## 2021-09-04 ENCOUNTER — RESULT REVIEW (OUTPATIENT)
Age: 56
End: 2021-09-04

## 2021-09-04 ENCOUNTER — APPOINTMENT (OUTPATIENT)
Dept: ULTRASOUND IMAGING | Facility: CLINIC | Age: 56
End: 2021-09-04
Payer: COMMERCIAL

## 2021-09-04 ENCOUNTER — OUTPATIENT (OUTPATIENT)
Dept: OUTPATIENT SERVICES | Facility: HOSPITAL | Age: 56
LOS: 1 days | End: 2021-09-04

## 2021-09-04 ENCOUNTER — OUTPATIENT (OUTPATIENT)
Dept: OUTPATIENT SERVICES | Facility: HOSPITAL | Age: 56
LOS: 1 days | End: 2021-09-04
Payer: COMMERCIAL

## 2021-09-04 DIAGNOSIS — Z98.82 BREAST IMPLANT STATUS: Chronic | ICD-10-CM

## 2021-09-04 DIAGNOSIS — Z90.10 ACQUIRED ABSENCE OF UNSPECIFIED BREAST AND NIPPLE: Chronic | ICD-10-CM

## 2021-09-04 DIAGNOSIS — Z98.89 OTHER SPECIFIED POSTPROCEDURAL STATES: Chronic | ICD-10-CM

## 2021-09-04 DIAGNOSIS — Z91.89 OTHER SPECIFIED PERSONAL RISK FACTORS, NOT ELSEWHERE CLASSIFIED: ICD-10-CM

## 2021-09-04 DIAGNOSIS — Z98.1 ARTHRODESIS STATUS: Chronic | ICD-10-CM

## 2021-09-04 DIAGNOSIS — M71.30 OTHER BURSAL CYST, UNSPECIFIED SITE: Chronic | ICD-10-CM

## 2021-09-04 DIAGNOSIS — Z00.8 ENCOUNTER FOR OTHER GENERAL EXAMINATION: ICD-10-CM

## 2021-09-04 PROCEDURE — 76856 US EXAM PELVIC COMPLETE: CPT | Mod: 26

## 2021-09-04 PROCEDURE — 76830 TRANSVAGINAL US NON-OB: CPT | Mod: 26

## 2021-09-04 PROCEDURE — 76536 US EXAM OF HEAD AND NECK: CPT

## 2021-09-04 PROCEDURE — 76830 TRANSVAGINAL US NON-OB: CPT

## 2021-09-04 PROCEDURE — 76856 US EXAM PELVIC COMPLETE: CPT

## 2021-09-04 PROCEDURE — 76536 US EXAM OF HEAD AND NECK: CPT | Mod: 26

## 2021-09-15 ENCOUNTER — NON-APPOINTMENT (OUTPATIENT)
Age: 56
End: 2021-09-15

## 2021-09-16 ENCOUNTER — RX RENEWAL (OUTPATIENT)
Age: 56
End: 2021-09-16

## 2021-09-20 ENCOUNTER — APPOINTMENT (OUTPATIENT)
Dept: PULMONOLOGY | Facility: CLINIC | Age: 56
End: 2021-09-20
Payer: COMMERCIAL

## 2021-09-20 PROCEDURE — 99214 OFFICE O/P EST MOD 30 MIN: CPT | Mod: 95

## 2021-10-01 ENCOUNTER — APPOINTMENT (OUTPATIENT)
Dept: PULMONOLOGY | Facility: CLINIC | Age: 56
End: 2021-10-01

## 2021-10-13 ENCOUNTER — APPOINTMENT (OUTPATIENT)
Dept: PULMONOLOGY | Facility: CLINIC | Age: 56
End: 2021-10-13

## 2021-10-19 NOTE — ASSESSMENT
[FreeTextEntry1] : Ms. Kunz is a 56 year old female with a history of chronic sinus disease, GERD, Hashimoto's thyroiditis, BOOP, IgG deficiency, recent iron deficient anemia, asthma (eosinophilic) coming in for pulmonary reevaluation via video call. She is s/p mastectomy and s/p hospitalization for "expander" Staph infection. She is now recovered and s/p Taxol, Herceptin and Perjetta RT  for her breast cancer. She is now s/p radiation therapy 7/2018, and presents to the office for a pulmonary follow up s/p COVID-19 pneumonitis with residual parenchymal changes 12/2020 - ?Active BOOP- symptomatic now \par  \par problem 1a: original abnormal chest CT s/p VATS - c/w BOOP(active)\par -BOOP, diagnosed by VATS in 2013 / 2019\par -add Prednisone 20 mg until controlled \par Information sheet given to the patient to be reviewed, this medication is never to be used without consulting the prescribing physician. Proper dietary restraint is necessary specifically salt containing foods, if any reaction may occur should be reported. 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 (6/2021)\par -s/p Sputum Screening for AFB x 3 \par -CAT scans are the only radiological modality to identify abnormalities w/in the lungs with regards to nodules/masses/lymph nodes. Risks, benefits were reviewed in detail. The guidelines for abnormalities include follow up CT scans at various intervals which could range from 6 weeks to 1 year intervals. If there is a change for the worse then consideration for a biopsy will be considered if you are a candidate. Second opinion evaluation with a thoracic surgeon or an interventional radiologist could be offered. \par \par Problem 1C: s/p Covid-19 Pneumonitis (12/2020) - resolved\par -s/p a course of Prednisone; 30 mg for 5 days, then 20 mg for 5 days, then 10 mg for 5 days (completed 1/20/2021) \par Information sheet given to the patient to be reviewed, this medication is never to be used without consulting the prescribing physician. Proper dietary restraint is necessary specifically salt containing foods, if any reaction may occur should be reported. \par \par -s/p monoclonal antibody infusions\par -hold vaccine until 5/2021\par \par COVID-19 precautionary Immune Support Recommendations:\par -OTC Vitamin C 1000mg BID \par -OTC Quercetin 500mg BID \par -OTC Zinc 50 mg per day \par -OTC Melatonin 5 mg a night \par -OTC Vitamin D 2000mg per day \par -OTC Tonic Water 8oz per day\par -Water 0.5-1 gallon per day\par \par Additional Support Supplements: \par -Liposomal Glutathione 500 mg BID\par -SPM 1 tablet BID \par -Berberine 1000 mg BID  \par \par problem 2: BOOP - confirmed-"active"\par -add Prednisone 20 mg a day until controlled \par -diagnosed by right VATs \par -follow up chest CT (last 2/2021, next 6/2021) \par -continue Zithromax 500 mg M/W/F (continue)\par \par problem 3b: eosinophilic  asthma\par -off Nucala ; Consider Fasenra or Dupixent. started Fasenra on 9/17/2020 \par -Dupixent since 6/2021\par -The safety and efficacy of Nucala was established in three double-blind, randomized, placebo-controlled trials in patients with severe asthma. Compared to a placebo, patients with severe asthma receiving Nucala had fewer exacerbation requiring hospitalization and/or emergency department visits, and a longer time to first exacerbation.  In addition, patients with severe asthma receiving Nucala experienced greater reductions in their daily maintenance oral corticosteroid dose, while maintaining asthma control compared with patients receiving placebo. Treatment with Nucala did not result in a significant improvement in lung function, as measured by the volume of air exhaled by patients in one second. The most common side effects include: headache, injection site reactions, back pain, weakness, and fatigue; hypersensitivity reactions can occur within hours or days including swelling of the face, mouth, and tongue, fainting, dizziness, hives, breathing problems, and rash; herpes zoster infections have occurred. The drug is a monoclonal antibody that inhibits interleukin -5 which helps regular eosinophils, a type of white blood cell that contributes to asthma. The over-production of eosinophils can cause inflammation in the lungs, increasing the frequency of asthma attacks. Patients must also take other medications, including high dose inhaled corticosteroids and at least one additional asthma drug. \par  \par problem 3C: steroid Dependent Asthma\par - Dupixent since 6/2021\par Dupixent is a prescription medicine used with other asthma medicines for the maintenance treatment of moderate-to-severe asthma in people aged 12 years and older whose asthma is not controlled with their current asthma medicines. Dupixent helps prevent severe asthma attacks (exacerbations) and can improve your breathing. Dupixent may also help reduce the amount of oral corticosteroids you need while preventing severe asthma attacks and improving your breathing. Dupixent is not used to treat sudden breathing problems. Risks and side effect of Dupixent were discussed and reviewed with patient.\par \par problem 4: allergic rhinitis\par -recommended Xlear or nasal saline, Navage.\par -recommended Sinugator \par -Environmental measures for allergies were encouraged including mattress and pillow cover, air purifier, and environmental controls. \par \par problem 5: GERD/LPR\par -continue Pepcid 40 mg QHS \par - continue Omeprazole 20 mg or 40 mg before breakfast\par - Recommended Apple cider vineger tablets. \par -Rule of 2s: avoid eating too much, eating too late, eating too spicy, eating two hours before bed\par -Things to avoid including overeating, spicy foods, tight clothing, eating within three hours of bed, this list is not all inclusive. \par -For treatment of reflux, possible options discussed including diet control, H2 blockers, PPIs, as well as coating motility agents discussed as treatment options. Timing of meals and proximity of last meal to sleep were discussed. If symptoms persist, a formal gastrointestinal evaluation is needed. \par \par problem 6: sensory neuropathic cough (controlled)\par -add Neurontin 100 QSH\par -off Amitriptyline 10 mg up to TID, QHS \par -s/p Baclofen 10 mg pre-meal and QHS\par -continue Tessalon Perles 200 mg qHS\par -Sensory neuropathic cough is an etiology of cough that is often realized once someone has been ruled out for common disease such as: asthma, COPD, eosinophilic bronchitis, bronchiectasis, post nasal drip, and GERD. It sometimes develops following a URI, herpes zoster outbreak in pharynx or thyroid or cervical spine injury. However, many patients have no identifiable antecedent explanation. \par \par problem 7: r/o diaphragm dysfunction\par -fluoroscopy of the diaphragm was all normal\par \par problem 8: low immunity \par -continue to use Gammagard weekly \par -recommended early intervention  \par -s/p Prevnar 13 vaccine / Pneumovax\par \par problem 9: health maintenance\par -s/p Covid 19 vaccine x 2\par -s/p 2020 flu shot\par -s/p 1st Shingrix vaccine, s/p 2nd shot\par -recommended strep pneumonia vaccines: Prevnar-13 vaccine, followed by Pneumo vaccine 23 on year following\par -recommended early intervention for URIs\par -recommended osteoporosis evaluations\par -recommended early dermatological evaluations\par -recommended after the age of 50 to the age of 70, colonoscopy every 5 years\par \par F/U in 3 months with SPI and NiOx\par She is encouraged to call with any changes, concerns, or questions

## 2021-10-19 NOTE — REASON FOR VISIT
[Follow-Up] : a follow-up visit [FreeTextEntry1] : video call-s/p COVID-19 infection 12/2020, abnormal chest CT, severe persistent asthma, BOOP, cough, eosinophilic asthma/PNA, GERD, lung nodules, PND, and SOB

## 2021-10-19 NOTE — ADDENDUM
[FreeTextEntry1] : Documented by Boom Skinner acting as a scribe for Dr. Munir Malin on 09/20/2021 \par \par All medical record entries made by the Scribe were at my, Dr. Munir Malin's, direction and personally dictated by me on 09/20/2021 . I have reviewed the chart and agree that the record accurately reflects my personal performance of the history, physical exam, assessment and plan. I have also personally directed, reviewed, and agree with the discharge instructions

## 2021-10-19 NOTE — HISTORY OF PRESENT ILLNESS
[Home] : at home, [unfilled] , at the time of the visit. [Medical Office: ([facility])___] : at the medical office located in  [Verbal consent obtained from patient] : the patient, [unfilled] [FreeTextEntry1] : Ms. Kunz is a 56 year old female with a history of abnormal chest CT, severe persistent asthma, BOOP, cough, eosinophilic asthma/PNA, GERD, lung nodules, PND, and SOB and was presents today via video call for a follow up. Her chief complaint is \par \par -she notes constant cough that limits speech and unable to take deep breath\par -she notes when on prednisone 20 mg cough improved but once tapering off, the cough came back\par -she notes taking baclofen due to exhaustion \par -she notes taking Dupixent shots since June 2021 and reports less exacerbations, less usage of her rescue inhalers and less sob. \par -she notes not wanting to be on prednisone \par \par - She  denies any visual issues, headaches, nausea, vomiting, fever, chills, sweats, chest pains, chest pressure, diarrhea, constipation, dysphagia, myalgia, dizziness, leg swelling, leg pain, itchy eyes, itchy ears, heartburn, reflux, or sour taste in the mouth.

## 2021-10-26 ENCOUNTER — OUTPATIENT (OUTPATIENT)
Dept: OUTPATIENT SERVICES | Facility: HOSPITAL | Age: 56
LOS: 1 days | End: 2021-10-26
Payer: COMMERCIAL

## 2021-10-26 ENCOUNTER — APPOINTMENT (OUTPATIENT)
Dept: CT IMAGING | Facility: CLINIC | Age: 56
End: 2021-10-26
Payer: COMMERCIAL

## 2021-10-26 DIAGNOSIS — Z98.89 OTHER SPECIFIED POSTPROCEDURAL STATES: Chronic | ICD-10-CM

## 2021-10-26 DIAGNOSIS — R93.89 ABNORMAL FINDINGS ON DIAGNOSTIC IMAGING OF OTHER SPECIFIED BODY STRUCTURES: ICD-10-CM

## 2021-10-26 DIAGNOSIS — Z98.82 BREAST IMPLANT STATUS: Chronic | ICD-10-CM

## 2021-10-26 DIAGNOSIS — Z98.1 ARTHRODESIS STATUS: Chronic | ICD-10-CM

## 2021-10-26 DIAGNOSIS — M71.30 OTHER BURSAL CYST, UNSPECIFIED SITE: Chronic | ICD-10-CM

## 2021-10-26 DIAGNOSIS — Z90.10 ACQUIRED ABSENCE OF UNSPECIFIED BREAST AND NIPPLE: Chronic | ICD-10-CM

## 2021-10-26 DIAGNOSIS — Z00.8 ENCOUNTER FOR OTHER GENERAL EXAMINATION: ICD-10-CM

## 2021-10-26 PROCEDURE — 71250 CT THORAX DX C-: CPT

## 2021-10-26 PROCEDURE — 71250 CT THORAX DX C-: CPT | Mod: 26

## 2021-10-28 ENCOUNTER — NON-APPOINTMENT (OUTPATIENT)
Age: 56
End: 2021-10-28

## 2021-11-01 ENCOUNTER — APPOINTMENT (OUTPATIENT)
Dept: THORACIC SURGERY | Facility: CLINIC | Age: 56
End: 2021-11-01
Payer: COMMERCIAL

## 2021-11-01 VITALS
SYSTOLIC BLOOD PRESSURE: 121 MMHG | OXYGEN SATURATION: 94 % | WEIGHT: 165 LBS | DIASTOLIC BLOOD PRESSURE: 84 MMHG | HEIGHT: 64 IN | BODY MASS INDEX: 28.17 KG/M2 | TEMPERATURE: 98.7 F | HEART RATE: 102 BPM | RESPIRATION RATE: 18 BRPM

## 2021-11-01 PROCEDURE — 99213 OFFICE O/P EST LOW 20 MIN: CPT

## 2021-11-03 ENCOUNTER — OUTPATIENT (OUTPATIENT)
Dept: OUTPATIENT SERVICES | Facility: HOSPITAL | Age: 56
LOS: 1 days | End: 2021-11-03
Payer: COMMERCIAL

## 2021-11-03 VITALS
TEMPERATURE: 99 F | SYSTOLIC BLOOD PRESSURE: 128 MMHG | HEIGHT: 63 IN | HEART RATE: 98 BPM | OXYGEN SATURATION: 98 % | WEIGHT: 179.9 LBS | DIASTOLIC BLOOD PRESSURE: 80 MMHG | RESPIRATION RATE: 16 BRPM

## 2021-11-03 DIAGNOSIS — E03.9 HYPOTHYROIDISM, UNSPECIFIED: ICD-10-CM

## 2021-11-03 DIAGNOSIS — Z98.89 OTHER SPECIFIED POSTPROCEDURAL STATES: Chronic | ICD-10-CM

## 2021-11-03 DIAGNOSIS — R41.89 OTHER SYMPTOMS AND SIGNS INVOLVING COGNITIVE FUNCTIONS AND AWARENESS: ICD-10-CM

## 2021-11-03 DIAGNOSIS — Z90.10 ACQUIRED ABSENCE OF UNSPECIFIED BREAST AND NIPPLE: Chronic | ICD-10-CM

## 2021-11-03 DIAGNOSIS — E11.9 TYPE 2 DIABETES MELLITUS WITHOUT COMPLICATIONS: ICD-10-CM

## 2021-11-03 DIAGNOSIS — Z98.82 BREAST IMPLANT STATUS: Chronic | ICD-10-CM

## 2021-11-03 DIAGNOSIS — M71.30 OTHER BURSAL CYST, UNSPECIFIED SITE: Chronic | ICD-10-CM

## 2021-11-03 DIAGNOSIS — R91.1 SOLITARY PULMONARY NODULE: ICD-10-CM

## 2021-11-03 DIAGNOSIS — Z98.1 ARTHRODESIS STATUS: Chronic | ICD-10-CM

## 2021-11-03 LAB
A1C WITH ESTIMATED AVERAGE GLUCOSE RESULT: 5.2 % — SIGNIFICANT CHANGE UP (ref 4–5.6)
ANION GAP SERPL CALC-SCNC: 12 MMOL/L — SIGNIFICANT CHANGE UP (ref 7–14)
BLD GP AB SCN SERPL QL: NEGATIVE — SIGNIFICANT CHANGE UP
BUN SERPL-MCNC: 10 MG/DL — SIGNIFICANT CHANGE UP (ref 7–23)
CALCIUM SERPL-MCNC: 9.2 MG/DL — SIGNIFICANT CHANGE UP (ref 8.4–10.5)
CHLORIDE SERPL-SCNC: 102 MMOL/L — SIGNIFICANT CHANGE UP (ref 98–107)
CO2 SERPL-SCNC: 27 MMOL/L — SIGNIFICANT CHANGE UP (ref 22–31)
CREAT SERPL-MCNC: 0.58 MG/DL — SIGNIFICANT CHANGE UP (ref 0.5–1.3)
ESTIMATED AVERAGE GLUCOSE: 103 — SIGNIFICANT CHANGE UP
GLUCOSE SERPL-MCNC: 101 MG/DL — HIGH (ref 70–99)
HCT VFR BLD CALC: 44.2 % — SIGNIFICANT CHANGE UP (ref 34.5–45)
HGB BLD-MCNC: 14.4 G/DL — SIGNIFICANT CHANGE UP (ref 11.5–15.5)
MCHC RBC-ENTMCNC: 28.2 PG — SIGNIFICANT CHANGE UP (ref 27–34)
MCHC RBC-ENTMCNC: 32.6 GM/DL — SIGNIFICANT CHANGE UP (ref 32–36)
MCV RBC AUTO: 86.5 FL — SIGNIFICANT CHANGE UP (ref 80–100)
NRBC # BLD: 0 /100 WBCS — SIGNIFICANT CHANGE UP
NRBC # FLD: 0 K/UL — SIGNIFICANT CHANGE UP
PLATELET # BLD AUTO: 232 K/UL — SIGNIFICANT CHANGE UP (ref 150–400)
POTASSIUM SERPL-MCNC: 4.1 MMOL/L — SIGNIFICANT CHANGE UP (ref 3.5–5.3)
POTASSIUM SERPL-SCNC: 4.1 MMOL/L — SIGNIFICANT CHANGE UP (ref 3.5–5.3)
RBC # BLD: 5.11 M/UL — SIGNIFICANT CHANGE UP (ref 3.8–5.2)
RBC # FLD: 14.3 % — SIGNIFICANT CHANGE UP (ref 10.3–14.5)
RH IG SCN BLD-IMP: POSITIVE — SIGNIFICANT CHANGE UP
SODIUM SERPL-SCNC: 141 MMOL/L — SIGNIFICANT CHANGE UP (ref 135–145)
WBC # BLD: 5.15 K/UL — SIGNIFICANT CHANGE UP (ref 3.8–10.5)
WBC # FLD AUTO: 5.15 K/UL — SIGNIFICANT CHANGE UP (ref 3.8–10.5)

## 2021-11-03 PROCEDURE — 93010 ELECTROCARDIOGRAM REPORT: CPT

## 2021-11-03 RX ORDER — MOMETASONE FUROATE 220 UG/1
1 INHALANT RESPIRATORY (INHALATION)
Qty: 0 | Refills: 0 | DISCHARGE

## 2021-11-03 RX ORDER — RANITIDINE HYDROCHLORIDE 150 MG/1
1 TABLET, FILM COATED ORAL
Qty: 0 | Refills: 0 | DISCHARGE

## 2021-11-03 RX ORDER — AMITRIPTYLINE HCL 25 MG
1 TABLET ORAL
Qty: 0 | Refills: 0 | DISCHARGE

## 2021-11-03 RX ORDER — SODIUM CHLORIDE 9 MG/ML
1000 INJECTION, SOLUTION INTRAVENOUS
Refills: 0 | Status: DISCONTINUED | OUTPATIENT
Start: 2021-11-16 | End: 2021-11-17

## 2021-11-03 RX ORDER — LEVALBUTEROL 1.25 MG/.5ML
3 SOLUTION, CONCENTRATE RESPIRATORY (INHALATION)
Qty: 0 | Refills: 0 | DISCHARGE

## 2021-11-03 NOTE — H&P PST ADULT - ATTENDING COMMENTS
Patient seen and examined agree with above note as modified, where appropriate, by me. The risks, benefits and alternatives of the procedure were explained to the patient including but not limited to bleeding, infection, prolonged air leak, shortness of breath and chronic pain. All of the patients questions were answered, she demonstrated understanding and freely consented to the procedure.

## 2021-11-03 NOTE — H&P PST ADULT - HISTORY OF PRESENT ILLNESS
53y/o female scheduled for flexible bronchoscopy, left video assisted thoracoscopy, wedge resection on 8/30/2019.  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  Dx with left breast cancer 2017, s/p bilateral mastectomy with chemo and radiation.  Recent chest ct shows left lung nodule increasing in size.  Was started on Gammagard infusions weekly approx 5 weeks ago."   Pt admits to COVID 12/20  Pt has had Pfizer vaccine x3   Patient instructed to contact surgeon's office concerning COVID test prior to surgery     55y/o female scheduled for flexible bronchoscopy, left video assisted thoracoscopy, wedge resection on 8/30/2019.  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  Dx with left breast cancer 2017, s/p bilateral mastectomy with chemo and radiation.  Recent chest ct shows left lung nodule increasing in size.  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 Pt hx COVID 12/20 tx with monoclonal antibodies - prior hx lumbar fusion with hardware , IGG  deficiency, Leukopenia - NO BP / IV left arm     Pt admits to COVID 12/20  Pt has had Pfizer vaccine x3   Patient instructed to contact surgeon's office concerning COVID test prior to surgery

## 2021-11-03 NOTE — H&P PST ADULT - NEGATIVE GENERAL SYMPTOMS
no fever/no chills/no sweating/no anorexia/no weight loss/no weight gain/no malaise/no fatigue no fever/no chills

## 2021-11-03 NOTE — H&P PST ADULT - NEGATIVE CARDIOVASCULAR SYMPTOMS
no chest pain/no palpitations/no dyspnea on exertion/no orthopnea/no paroxysmal nocturnal dyspnea/no peripheral edema/no claudication no chest pain/no palpitations/no dyspnea on exertion/no peripheral edema

## 2021-11-03 NOTE — H&P PST ADULT - PRIMARY CARE PROVIDER
[facility] pmd  [phone], Dr. Yosi Robledo oncologist [place] [facility]  [phone],

## 2021-11-03 NOTE — H&P PST ADULT - SKIN/BREAST COMMENTS
hx of breast cancer 2017 s/p chemo and radiation bilateral mastectomy hx of breast cancer 2017 s/p chemo and radiation bilateral mastectomy - NO IV/BP left arm

## 2021-11-03 NOTE — H&P PST ADULT - NSICDXPASTSURGICALHX_GEN_ALL_CORE_FT
PAST SURGICAL HISTORY:  H/O endoscopic sinus surgery rhinoplasty 5/2015    H/O lymph node excision 09/2010, benign    H/O mastectomy bilateral, placement of tissue expanders 10/30/2017    S/P breast reconstruction tissue expanders removed secondary to left staph infection    S/P breast reconstruction ROJELIO 6/6/2019    S/P lumbar fusion 3/2016    S/P thoracotomy 06/30/2013  Right VATS  Wedge resection    Synovial cyst L5- S1   08/26/2016 back surgery

## 2021-11-03 NOTE — H&P PST ADULT - NSICDXPASTMEDICALHX_GEN_ALL_CORE_FT
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

## 2021-11-03 NOTE — H&P PST ADULT - RESPIRATORY AND THORAX COMMENTS
last episode of wheezing 6/2019, lung nodule increasing in size, was prescribed amitriptyline for neuropathic cough Hx of past right and left lung wedge resections for BOOP - CT scan 2/21 and 10/21 shows increase in size of nodules - pt has chronic cough - tx with several nebulizers and inhalers

## 2021-11-03 NOTE — H&P PST ADULT - NEGATIVE GENERAL GENITOURINARY SYMPTOMS
no hematuria/no flank pain L/no flank pain R/no bladder infections/no dysuria/normal urinary frequency no hematuria/no flank pain L/no flank pain R/no dysuria

## 2021-11-03 NOTE — H&P PST ADULT - ENDOCRINE COMMENTS
hypothyroidism, tsh followed by pmd always on same dose Hypothyroid - stable on current dose - DM - on  oral med as preventative - states A1c in 5's

## 2021-11-03 NOTE — H&P PST ADULT - PROBLEM SELECTOR PLAN 1
Pt scheduled for surgery  Redo Right Video Assisted Thoracoscopy Wedge Resection with dr Huerta tentatively 11/16/21and preop instructions including instructions for taking Famotidine and for Chlorhexidine use in showering on the day of surgery, given verbally and with use of  written materials, and patient confirming understanding of such instructions using  teach back method.  Pt to see PCP for [facility] - forms given   Request Pulmonary clearance and recent PFTs

## 2021-11-04 NOTE — ASSESSMENT
[FreeTextEntry1] :  This is  a 54 year old female. with hx of Endometrial hyperplasia, GERD, Leukopenia and lung nodules,  left breast cancer in 2017 s/p b/l mastectomy, chemo, RT. \par \par She is s/p Right VATS, right upper lobe wedge resection x3 on 6/28/2013. 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 \par \par She is s/p Left VATS , Left lower lobe wedge resection on 8/30/2019. Pathology reveals localized organizing pneumonia with lymphoid hyperplasia.\par \par Patient was last seen on 12/16/2019, I recommended patient to RTC in 03/2020, no f/u since then. Patient had CT chest on 06/20/2020, on 08/05/2020, 02/20/2021 by Dr. Malin. \par \par Of note, patient had COVID-19 Pneumonitis in 12/2020 s/p Monoclonal antibody infusions. \par \par I have reviewed the patient's medical records and diagnostic images at time of this office consultation and have made the following recommendation:\par 1. CT chest reviewed and explained to patient, lung nodule likely benign, however, given the patient's hx, malignant cannot be excluded, I recommended a Right VATS, wedge resection for diagnosis. Risks and benefits and alternatives explained to patient, all questions answered, patient agreed to proceed with surgery.\par 2. Medical clearance and PST.\par 3. Will schedule surgery on the week she is off from Dupixent. \par \par I personally performed the services described in the documentation, reviewed the documentation recorded by the scribe in my presence and it accurately and completely records my words and actions.\par \par I, Lynne Santillan NP, am scribing for and the presence of MORENO Reilly, the following sections HISTORY OF PRESENT ILLNESS, PAST MEDICAL/FAMILY/SOCIAL HISTORY; REVIEW OF SYSTEMS; VITAL SIGNS; PHYSICAL EXAM; DISPOSITION.

## 2021-11-04 NOTE — HISTORY OF PRESENT ILLNESS
[FreeTextEntry1] : 56 year old female. with hx of Endometrial hyperplasia, GERD, Leukopenia and lung nodules,  left breast cancer in 2017 s/p b/l mastectomy, chemo, RT. \par \par She is s/p Right VATS, right upper lobe wedge resection x3 on 6/28/2013. 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 \par \par She is s/p Left VATS , Left lower lobe wedge resection on 8/30/2019. Pathology reveals localized organizing pneumonia with lymphoid hyperplasia.\par \par Patient was last seen on 12/16/2019, I recommended patient to RTC in 03/2020, no f/u since then. Patient had CT chest on 06/20/2020, on 08/05/2020, 02/20/2021 by Dr. Malin. \par \par Of note, patient had COVID-19 Pneumonitis in 12/2020 s/p Monoclonal antibody infusions. \par \par CT chest on 02/20/2021:\par - Multiple bilateral airway centered nodules are again identified, some of which have decreased in size in some of which is slightly increased. For example in the periphery of the right lower lobe on series 3 image 95 there is a nodule which has increased in size compared to prior exam and now measures 1.2 cm, previously 0.3 cm. In the left apex a previously seen 0.5 cm nodule has essentially resolved with residual groundglass abnormality seen on series 3 image 31.\par \par CT chest on 10/26/2021:\par - Bilateral pulmonary nodules have increased in size in number since 2/20/2021 and involve all lobes. The largest nodules measure:\par * In the left lower lobe is a 8 x 8 mm noncalcified nodule (series 4 image 91).\par * In the right lower lobe is a 7 x 9 mm nodule (series 76).\par There are additional bilateral groundglass opacities and peripheral foci of consolidation best shown in the right middle lobe and lower lobes.\par \par Patient is currently on Azithromycin 500 mg Monday/Wednesday/Friday. She is taking Dupixent every other week, last taken on 10/23/2021. \par \par Pt presents today for follow up. Patient c/o chronic dry cough, chest tightness on and off, denies shortness of breath, chest pain, fever, chills.

## 2021-11-04 NOTE — CONSULT LETTER
[Please see my note below.] : Please see my note below. [Sincerely,] : Sincerely, [FreeTextEntry3] : Robert Huerta MD\par Attending Surgeon\par Division of Thoracic Surgery\par , [facility] [employer] [facility]\par

## 2021-11-09 ENCOUNTER — APPOINTMENT (OUTPATIENT)
Dept: PULMONOLOGY | Facility: CLINIC | Age: 56
End: 2021-11-09

## 2021-11-13 ENCOUNTER — APPOINTMENT (OUTPATIENT)
Dept: DISASTER EMERGENCY | Facility: CLINIC | Age: 56
End: 2021-11-13

## 2021-11-13 ENCOUNTER — LABORATORY RESULT (OUTPATIENT)
Age: 56
End: 2021-11-13

## 2021-11-15 RX ORDER — BUDESONIDE, MICRONIZED 100 %
2 POWDER (GRAM) MISCELLANEOUS
Qty: 0 | Refills: 0 | DISCHARGE

## 2021-11-16 ENCOUNTER — TRANSCRIPTION ENCOUNTER (OUTPATIENT)
Age: 56
End: 2021-11-16

## 2021-11-16 ENCOUNTER — INPATIENT (INPATIENT)
Facility: HOSPITAL | Age: 56
LOS: 0 days | Discharge: HOME | End: 2021-11-17
Attending: THORACIC SURGERY (CARDIOTHORACIC VASCULAR SURGERY) | Admitting: THORACIC SURGERY (CARDIOTHORACIC VASCULAR SURGERY)
Payer: COMMERCIAL

## 2021-11-16 ENCOUNTER — RESULT REVIEW (OUTPATIENT)
Age: 56
End: 2021-11-16

## 2021-11-16 ENCOUNTER — APPOINTMENT (OUTPATIENT)
Dept: THORACIC SURGERY | Facility: HOSPITAL | Age: 56
End: 2021-11-16

## 2021-11-16 VITALS
RESPIRATION RATE: 15 BRPM | DIASTOLIC BLOOD PRESSURE: 78 MMHG | SYSTOLIC BLOOD PRESSURE: 116 MMHG | HEIGHT: 63 IN | WEIGHT: 179.9 LBS | TEMPERATURE: 99 F | HEART RATE: 103 BPM | OXYGEN SATURATION: 98 %

## 2021-11-16 DIAGNOSIS — Z98.82 BREAST IMPLANT STATUS: Chronic | ICD-10-CM

## 2021-11-16 DIAGNOSIS — R41.89 OTHER SYMPTOMS AND SIGNS INVOLVING COGNITIVE FUNCTIONS AND AWARENESS: ICD-10-CM

## 2021-11-16 DIAGNOSIS — Z98.89 OTHER SPECIFIED POSTPROCEDURAL STATES: Chronic | ICD-10-CM

## 2021-11-16 DIAGNOSIS — Z98.1 ARTHRODESIS STATUS: Chronic | ICD-10-CM

## 2021-11-16 DIAGNOSIS — M71.30 OTHER BURSAL CYST, UNSPECIFIED SITE: Chronic | ICD-10-CM

## 2021-11-16 DIAGNOSIS — Z90.10 ACQUIRED ABSENCE OF UNSPECIFIED BREAST AND NIPPLE: Chronic | ICD-10-CM

## 2021-11-16 LAB
GLUCOSE BLDC GLUCOMTR-MCNC: 104 MG/DL — HIGH (ref 70–99)
GLUCOSE BLDC GLUCOMTR-MCNC: 170 MG/DL — HIGH (ref 70–99)
GLUCOSE BLDC GLUCOMTR-MCNC: 176 MG/DL — HIGH (ref 70–99)
GRAM STN FLD: SIGNIFICANT CHANGE UP
GRAM STN FLD: SIGNIFICANT CHANGE UP
NIGHT BLUE STAIN TISS: SIGNIFICANT CHANGE UP
NIGHT BLUE STAIN TISS: SIGNIFICANT CHANGE UP
SPECIMEN SOURCE: SIGNIFICANT CHANGE UP

## 2021-11-16 PROCEDURE — ZZZZZ: CPT

## 2021-11-16 PROCEDURE — 32608 THORACOSCOPY W/BX NODULE: CPT

## 2021-11-16 PROCEDURE — 88331 PATH CONSLTJ SURG 1 BLK 1SPC: CPT | Mod: 26

## 2021-11-16 PROCEDURE — 88307 TISSUE EXAM BY PATHOLOGIST: CPT | Mod: 26

## 2021-11-16 PROCEDURE — 71045 X-RAY EXAM CHEST 1 VIEW: CPT | Mod: 26

## 2021-11-16 PROCEDURE — 88334 PATH CONSLTJ SURG CYTO XM EA: CPT | Mod: 26,59

## 2021-11-16 PROCEDURE — 31622 DX BRONCHOSCOPE/WASH: CPT

## 2021-11-16 RX ORDER — HEPARIN SODIUM 5000 [USP'U]/ML
5000 INJECTION INTRAVENOUS; SUBCUTANEOUS EVERY 8 HOURS
Refills: 0 | Status: DISCONTINUED | OUTPATIENT
Start: 2021-11-16 | End: 2021-11-17

## 2021-11-16 RX ORDER — HYDROMORPHONE HYDROCHLORIDE 2 MG/ML
30 INJECTION INTRAMUSCULAR; INTRAVENOUS; SUBCUTANEOUS
Refills: 0 | Status: DISCONTINUED | OUTPATIENT
Start: 2021-11-16 | End: 2021-11-16

## 2021-11-16 RX ORDER — LETROZOLE 2.5 MG/1
2.5 TABLET, FILM COATED ORAL DAILY
Refills: 0 | Status: DISCONTINUED | OUTPATIENT
Start: 2021-11-17 | End: 2021-11-17

## 2021-11-16 RX ORDER — IMMUNE GLOBULIN (HUMAN) 10 G/100ML
120 INJECTION INTRAVENOUS; SUBCUTANEOUS
Qty: 0 | Refills: 0 | DISCHARGE

## 2021-11-16 RX ORDER — GABAPENTIN 400 MG/1
1 CAPSULE ORAL
Qty: 0 | Refills: 0 | DISCHARGE

## 2021-11-16 RX ORDER — MONTELUKAST 4 MG/1
1 TABLET, CHEWABLE ORAL
Qty: 0 | Refills: 0 | DISCHARGE

## 2021-11-16 RX ORDER — AZITHROMYCIN 500 MG/1
1 TABLET, FILM COATED ORAL
Qty: 0 | Refills: 0 | DISCHARGE

## 2021-11-16 RX ORDER — FAMOTIDINE 10 MG/ML
20 INJECTION INTRAVENOUS DAILY
Refills: 0 | Status: DISCONTINUED | OUTPATIENT
Start: 2021-11-16 | End: 2021-11-17

## 2021-11-16 RX ORDER — ACETAMINOPHEN 500 MG
975 TABLET ORAL ONCE
Refills: 0 | Status: COMPLETED | OUTPATIENT
Start: 2021-11-16 | End: 2021-11-16

## 2021-11-16 RX ORDER — FAMOTIDINE 10 MG/ML
1 INJECTION INTRAVENOUS
Qty: 0 | Refills: 0 | DISCHARGE

## 2021-11-16 RX ORDER — LEVOTHYROXINE SODIUM 125 MCG
75 TABLET ORAL DAILY
Refills: 0 | Status: DISCONTINUED | OUTPATIENT
Start: 2021-11-16 | End: 2021-11-17

## 2021-11-16 RX ORDER — HYDROMORPHONE HYDROCHLORIDE 2 MG/ML
0.5 INJECTION INTRAMUSCULAR; INTRAVENOUS; SUBCUTANEOUS
Refills: 0 | Status: DISCONTINUED | OUTPATIENT
Start: 2021-11-16 | End: 2021-11-16

## 2021-11-16 RX ORDER — METFORMIN HYDROCHLORIDE 850 MG/1
1 TABLET ORAL
Qty: 0 | Refills: 0 | DISCHARGE

## 2021-11-16 RX ORDER — HYDROMORPHONE HYDROCHLORIDE 2 MG/ML
0.5 INJECTION INTRAMUSCULAR; INTRAVENOUS; SUBCUTANEOUS
Refills: 0 | Status: DISCONTINUED | OUTPATIENT
Start: 2021-11-16 | End: 2021-11-17

## 2021-11-16 RX ORDER — LETROZOLE 2.5 MG/1
2.5 TABLET, FILM COATED ORAL DAILY
Refills: 0 | Status: DISCONTINUED | OUTPATIENT
Start: 2021-11-16 | End: 2021-11-16

## 2021-11-16 RX ORDER — GLYCOPYRROLATE AND FORMOTEROL FUMARATE 9; 4.8 UG/1; UG/1
2 AEROSOL, METERED RESPIRATORY (INHALATION)
Refills: 0 | Status: DISCONTINUED | OUTPATIENT
Start: 2021-11-16 | End: 2021-11-17

## 2021-11-16 RX ORDER — GLYCOPYRROLATE AND FORMOTEROL FUMARATE 9; 4.8 UG/1; UG/1
2 AEROSOL, METERED RESPIRATORY (INHALATION)
Qty: 0 | Refills: 0 | DISCHARGE

## 2021-11-16 RX ORDER — FLUTICASONE PROPIONATE 220 MCG
1 AEROSOL WITH ADAPTER (GRAM) INHALATION
Qty: 0 | Refills: 0 | DISCHARGE

## 2021-11-16 RX ORDER — NALOXONE HYDROCHLORIDE 4 MG/.1ML
0.1 SPRAY NASAL
Refills: 0 | Status: DISCONTINUED | OUTPATIENT
Start: 2021-11-16 | End: 2021-11-16

## 2021-11-16 RX ORDER — ONDANSETRON 8 MG/1
4 TABLET, FILM COATED ORAL EVERY 6 HOURS
Refills: 0 | Status: DISCONTINUED | OUTPATIENT
Start: 2021-11-16 | End: 2021-11-16

## 2021-11-16 RX ORDER — OMEPRAZOLE 10 MG/1
1 CAPSULE, DELAYED RELEASE ORAL
Qty: 0 | Refills: 0 | DISCHARGE

## 2021-11-16 RX ORDER — MONTELUKAST 4 MG/1
10 TABLET, CHEWABLE ORAL DAILY
Refills: 0 | Status: DISCONTINUED | OUTPATIENT
Start: 2021-11-16 | End: 2021-11-17

## 2021-11-16 RX ORDER — HYDROMORPHONE HYDROCHLORIDE 2 MG/ML
30 INJECTION INTRAMUSCULAR; INTRAVENOUS; SUBCUTANEOUS
Refills: 0 | Status: DISCONTINUED | OUTPATIENT
Start: 2021-11-16 | End: 2021-11-17

## 2021-11-16 RX ORDER — BUDESONIDE AND FORMOTEROL FUMARATE DIHYDRATE 160; 4.5 UG/1; UG/1
2 AEROSOL RESPIRATORY (INHALATION)
Refills: 0 | Status: DISCONTINUED | OUTPATIENT
Start: 2021-11-16 | End: 2021-11-17

## 2021-11-16 RX ORDER — LETROZOLE 2.5 MG/1
1 TABLET, FILM COATED ORAL
Qty: 0 | Refills: 0 | DISCHARGE

## 2021-11-16 RX ORDER — ACETAMINOPHEN 500 MG
1000 TABLET ORAL ONCE
Refills: 0 | Status: COMPLETED | OUTPATIENT
Start: 2021-11-16 | End: 2021-11-17

## 2021-11-16 RX ORDER — ERGOCALCIFEROL 1.25 MG/1
1 CAPSULE ORAL
Qty: 0 | Refills: 0 | DISCHARGE

## 2021-11-16 RX ORDER — DUPILUMAB 300 MG/2ML
0 INJECTION, SOLUTION SUBCUTANEOUS
Qty: 0 | Refills: 0 | DISCHARGE

## 2021-11-16 RX ORDER — ALBUTEROL 90 UG/1
2 AEROSOL, METERED ORAL
Qty: 0 | Refills: 0 | DISCHARGE

## 2021-11-16 RX ADMIN — HYDROMORPHONE HYDROCHLORIDE 0.5 MILLIGRAM(S): 2 INJECTION INTRAMUSCULAR; INTRAVENOUS; SUBCUTANEOUS at 12:45

## 2021-11-16 RX ADMIN — HYDROMORPHONE HYDROCHLORIDE 30 MILLILITER(S): 2 INJECTION INTRAMUSCULAR; INTRAVENOUS; SUBCUTANEOUS at 19:40

## 2021-11-16 RX ADMIN — SODIUM CHLORIDE 30 MILLILITER(S): 9 INJECTION, SOLUTION INTRAVENOUS at 12:44

## 2021-11-16 RX ADMIN — Medication 975 MILLIGRAM(S): at 06:58

## 2021-11-16 RX ADMIN — ONDANSETRON 4 MILLIGRAM(S): 8 TABLET, FILM COATED ORAL at 14:46

## 2021-11-16 RX ADMIN — GLYCOPYRROLATE AND FORMOTEROL FUMARATE 2 PUFF(S): 9; 4.8 AEROSOL, METERED RESPIRATORY (INHALATION) at 22:40

## 2021-11-16 RX ADMIN — HYDROMORPHONE HYDROCHLORIDE 30 MILLILITER(S): 2 INJECTION INTRAMUSCULAR; INTRAVENOUS; SUBCUTANEOUS at 12:42

## 2021-11-16 RX ADMIN — HEPARIN SODIUM 5000 UNIT(S): 5000 INJECTION INTRAVENOUS; SUBCUTANEOUS at 16:23

## 2021-11-16 RX ADMIN — HYDROMORPHONE HYDROCHLORIDE 30 MILLILITER(S): 2 INJECTION INTRAMUSCULAR; INTRAVENOUS; SUBCUTANEOUS at 16:35

## 2021-11-16 RX ADMIN — HEPARIN SODIUM 5000 UNIT(S): 5000 INJECTION INTRAVENOUS; SUBCUTANEOUS at 22:39

## 2021-11-16 RX ADMIN — BUDESONIDE AND FORMOTEROL FUMARATE DIHYDRATE 2 PUFF(S): 160; 4.5 AEROSOL RESPIRATORY (INHALATION) at 22:40

## 2021-11-16 RX ADMIN — HYDROMORPHONE HYDROCHLORIDE 0.5 MILLIGRAM(S): 2 INJECTION INTRAMUSCULAR; INTRAVENOUS; SUBCUTANEOUS at 12:32

## 2021-11-16 NOTE — ASU PREOP CHECKLIST - ISOLATION PRECAUTIONS
Pt was seen in ED on Saturday for blood in the urine.  Discharged home and encouraged to increase po liquid intake.  Pt returns to ED today because mother noted a drop of blood after pt voided.  Denies fever.  PMH seizure disorder and hypothyroidism.  Allergic to Dilantin. none

## 2021-11-16 NOTE — CHART NOTE - NSCHARTNOTEFT_GEN_A_CORE
Patient s/p redo Right Vats, RLL wedge resection x2 and RML wedge resection. Patient complaining of incisional pain, IV PCA in place.  Chest tube to water seal, no air leak noted.  Incision with dressing clean and dry.  Patient tolerating po, olmos in place draining clear dimitry urine.   Vital Signs Last 24 Hrs  T(C): 36.8 (16 Nov 2021 20:55), Max: 37 (16 Nov 2021 06:30)  T(F): 98.3 (16 Nov 2021 20:55), Max: 98.6 (16 Nov 2021 06:30)  HR: 101 (16 Nov 2021 20:55) (92 - 103)  BP: 137/81 (16 Nov 2021 20:55) (108/86 - 140/89)  BP(mean): 99 (16 Nov 2021 15:00) (87 - 104)  RR: 18 (16 Nov 2021 20:55) (15 - 36)  SpO2: 94% (16 Nov 2021 20:55) (93% - 99%)  I&O's Detail    16 Nov 2021 07:01  -  16 Nov 2021 21:23  --------------------------------------------------------  IN:    Lactated Ringers: 120 mL    Oral Fluid: 50 mL  Total IN: 170 mL    OUT:    Chest Tube (mL): 95 mL    Indwelling Catheter - Urethral (mL): 415 mL  Total OUT: 510 mL    Total NET: -340 mL      MEDICATIONS  (STANDING):  acetaminophen   IVPB .. 1000 milliGRAM(s) IV Intermittent once  budesonide  80 MICROgram(s)/formoterol 4.5 MICROgram(s) Inhaler 2 Puff(s) Inhalation two times a day  famotidine    Tablet 20 milliGRAM(s) Oral daily  glycopyrrolate 9 MICROgram(s)/formoterol 4.8 MICROgram(s) Inhaler 2 Puff(s) Inhalation two times a day  heparin   Injectable 5000 Unit(s) SubCutaneous every 8 hours  HYDROmorphone PCA (1 mG/mL) 30 milliLiter(s) PCA Continuous PCA Continuous  lactated ringers. 1000 milliLiter(s) (30 mL/Hr) IV Continuous <Continuous>  levothyroxine 75 MICROGram(s) Oral daily  montelukast 10 milliGRAM(s) Oral daily    A/P: S/P Redo Right Vats, RLL wedge resection x2, RML wedge resection   Continue chest tube to water seal   Follow up CXR and labs in am   Chest PT, ambulation and incentive spirometer   Continue IV PCA for pain management

## 2021-11-16 NOTE — BRIEF OPERATIVE NOTE - OPERATION/FINDINGS
Flexible Bronchoscopy, Reoperative Right VATS, Right Lower Lobe Wedge Resection x2, and Right Middle Lobe Wedge Resection    I, Rolando Dailey PA-C, was the first assistant in this operation, including but not limited to helping to provide exposure for the surgeon to visualize the anatomy of the right chest, and wound closure.

## 2021-11-16 NOTE — ASU PREOP CHECKLIST - TEMPERATURE IN FAHRENHEIT (DEGREES F)
How Did The Hair Loss Occur?: gradual in onset
98.6
How Severe Is Your Hair Loss?: severe
Additional History: Patient stated that she was having a lot of Gastro intestinal problems been diagnosed with Crohn’s disease

## 2021-11-17 ENCOUNTER — TRANSCRIPTION ENCOUNTER (OUTPATIENT)
Age: 56
End: 2021-11-17

## 2021-11-17 VITALS
SYSTOLIC BLOOD PRESSURE: 140 MMHG | RESPIRATION RATE: 18 BRPM | TEMPERATURE: 98 F | OXYGEN SATURATION: 97 % | HEART RATE: 72 BPM | DIASTOLIC BLOOD PRESSURE: 78 MMHG

## 2021-11-17 LAB
ANION GAP SERPL CALC-SCNC: 15 MMOL/L — HIGH (ref 7–14)
BASOPHILS # BLD AUTO: 0.04 K/UL — SIGNIFICANT CHANGE UP (ref 0–0.2)
BASOPHILS NFR BLD AUTO: 0.7 % — SIGNIFICANT CHANGE UP (ref 0–2)
BUN SERPL-MCNC: 11 MG/DL — SIGNIFICANT CHANGE UP (ref 7–23)
CALCIUM SERPL-MCNC: 8.6 MG/DL — SIGNIFICANT CHANGE UP (ref 8.4–10.5)
CHLORIDE SERPL-SCNC: 102 MMOL/L — SIGNIFICANT CHANGE UP (ref 98–107)
CO2 SERPL-SCNC: 20 MMOL/L — LOW (ref 22–31)
COVID-19 NUCLEOCAPSID GAM AB INTERP: NEGATIVE — SIGNIFICANT CHANGE UP
COVID-19 NUCLEOCAPSID TOTAL GAM ANTIBODY RESULT: 0.89 INDEX — SIGNIFICANT CHANGE UP
COVID-19 SPIKE DOMAIN AB INTERP: POSITIVE
COVID-19 SPIKE DOMAIN ANTIBODY RESULT: >250 U/ML — HIGH
CREAT SERPL-MCNC: 0.57 MG/DL — SIGNIFICANT CHANGE UP (ref 0.5–1.3)
EOSINOPHIL # BLD AUTO: 0.01 K/UL — SIGNIFICANT CHANGE UP (ref 0–0.5)
EOSINOPHIL NFR BLD AUTO: 0.2 % — SIGNIFICANT CHANGE UP (ref 0–6)
GLUCOSE SERPL-MCNC: 137 MG/DL — HIGH (ref 70–99)
HCT VFR BLD CALC: 45.1 % — HIGH (ref 34.5–45)
HGB BLD-MCNC: 13.9 G/DL — SIGNIFICANT CHANGE UP (ref 11.5–15.5)
IANC: 4.28 K/UL — SIGNIFICANT CHANGE UP (ref 1.5–8.5)
IMM GRANULOCYTES NFR BLD AUTO: 1 % — SIGNIFICANT CHANGE UP (ref 0–1.5)
LYMPHOCYTES # BLD AUTO: 1.26 K/UL — SIGNIFICANT CHANGE UP (ref 1–3.3)
LYMPHOCYTES # BLD AUTO: 21 % — SIGNIFICANT CHANGE UP (ref 13–44)
MCHC RBC-ENTMCNC: 27.5 PG — SIGNIFICANT CHANGE UP (ref 27–34)
MCHC RBC-ENTMCNC: 30.8 GM/DL — LOW (ref 32–36)
MCV RBC AUTO: 89.3 FL — SIGNIFICANT CHANGE UP (ref 80–100)
MONOCYTES # BLD AUTO: 0.35 K/UL — SIGNIFICANT CHANGE UP (ref 0–0.9)
MONOCYTES NFR BLD AUTO: 5.8 % — SIGNIFICANT CHANGE UP (ref 2–14)
NEUTROPHILS # BLD AUTO: 4.28 K/UL — SIGNIFICANT CHANGE UP (ref 1.8–7.4)
NEUTROPHILS NFR BLD AUTO: 71.3 % — SIGNIFICANT CHANGE UP (ref 43–77)
NRBC # BLD: 0 /100 WBCS — SIGNIFICANT CHANGE UP
NRBC # FLD: 0 K/UL — SIGNIFICANT CHANGE UP
PLATELET # BLD AUTO: 181 K/UL — SIGNIFICANT CHANGE UP (ref 150–400)
POTASSIUM SERPL-MCNC: 4.1 MMOL/L — SIGNIFICANT CHANGE UP (ref 3.5–5.3)
POTASSIUM SERPL-SCNC: 4.1 MMOL/L — SIGNIFICANT CHANGE UP (ref 3.5–5.3)
RBC # BLD: 5.05 M/UL — SIGNIFICANT CHANGE UP (ref 3.8–5.2)
RBC # FLD: 14.7 % — HIGH (ref 10.3–14.5)
SARS-COV-2 IGG+IGM SERPL QL IA: 0.89 INDEX — SIGNIFICANT CHANGE UP
SARS-COV-2 IGG+IGM SERPL QL IA: >250 U/ML — HIGH
SARS-COV-2 IGG+IGM SERPL QL IA: NEGATIVE — SIGNIFICANT CHANGE UP
SARS-COV-2 IGG+IGM SERPL QL IA: POSITIVE
SODIUM SERPL-SCNC: 137 MMOL/L — SIGNIFICANT CHANGE UP (ref 135–145)
WBC # BLD: 6 K/UL — SIGNIFICANT CHANGE UP (ref 3.8–10.5)
WBC # FLD AUTO: 6 K/UL — SIGNIFICANT CHANGE UP (ref 3.8–10.5)

## 2021-11-17 PROCEDURE — 99238 HOSP IP/OBS DSCHRG MGMT 30/<: CPT

## 2021-11-17 PROCEDURE — 71045 X-RAY EXAM CHEST 1 VIEW: CPT | Mod: 26,76

## 2021-11-17 RX ORDER — OXYCODONE HYDROCHLORIDE 5 MG/1
5 TABLET ORAL EVERY 6 HOURS
Refills: 0 | Status: DISCONTINUED | OUTPATIENT
Start: 2021-11-17 | End: 2021-11-17

## 2021-11-17 RX ORDER — OXYCODONE HYDROCHLORIDE 5 MG/1
1 TABLET ORAL
Qty: 30 | Refills: 0
Start: 2021-11-17 | End: 2021-11-21

## 2021-11-17 RX ORDER — ACETAMINOPHEN 500 MG
2 TABLET ORAL
Qty: 0 | Refills: 0 | DISCHARGE

## 2021-11-17 RX ORDER — ACETAMINOPHEN 500 MG
650 TABLET ORAL EVERY 6 HOURS
Refills: 0 | Status: DISCONTINUED | OUTPATIENT
Start: 2021-11-17 | End: 2021-11-17

## 2021-11-17 RX ORDER — OXYCODONE HYDROCHLORIDE 5 MG/1
10 TABLET ORAL EVERY 6 HOURS
Refills: 0 | Status: DISCONTINUED | OUTPATIENT
Start: 2021-11-17 | End: 2021-11-17

## 2021-11-17 RX ADMIN — GLYCOPYRROLATE AND FORMOTEROL FUMARATE 2 PUFF(S): 9; 4.8 AEROSOL, METERED RESPIRATORY (INHALATION) at 08:29

## 2021-11-17 RX ADMIN — LETROZOLE 2.5 MILLIGRAM(S): 2.5 TABLET, FILM COATED ORAL at 12:08

## 2021-11-17 RX ADMIN — HYDROMORPHONE HYDROCHLORIDE 30 MILLILITER(S): 2 INJECTION INTRAMUSCULAR; INTRAVENOUS; SUBCUTANEOUS at 07:30

## 2021-11-17 RX ADMIN — FAMOTIDINE 20 MILLIGRAM(S): 10 INJECTION INTRAVENOUS at 12:08

## 2021-11-17 RX ADMIN — Medication 400 MILLIGRAM(S): at 06:09

## 2021-11-17 RX ADMIN — Medication 75 MICROGRAM(S): at 06:10

## 2021-11-17 RX ADMIN — BUDESONIDE AND FORMOTEROL FUMARATE DIHYDRATE 2 PUFF(S): 160; 4.5 AEROSOL RESPIRATORY (INHALATION) at 08:29

## 2021-11-17 RX ADMIN — Medication 1000 MILLIGRAM(S): at 06:39

## 2021-11-17 RX ADMIN — HEPARIN SODIUM 5000 UNIT(S): 5000 INJECTION INTRAVENOUS; SUBCUTANEOUS at 06:09

## 2021-11-17 NOTE — DISCHARGE NOTE NURSING/CASE MANAGEMENT/SOCIAL WORK - PATIENT PORTAL LINK FT
You can access the FollowMyHealth Patient Portal offered by [facility] by registering at the following website: [email or web address]. By joining [facility]’s FollowMyHealth portal, you will also be able to view your health information using other applications (apps) compatible with our system.

## 2021-11-17 NOTE — DISCHARGE NOTE PROVIDER - CARE PROVIDER_API CALL
Robert Huerta)  Surgery; Thoracic Surgery  [phone] [place] [address], [place] [address]  Phone: [phone]  Fax: [phone]  Follow Up Time:

## 2021-11-17 NOTE — PHYSICAL THERAPY INITIAL EVALUATION ADULT - GENERAL OBSERVATIONS, REHAB EVAL
patient received ambulating in room in [facility]. patient denies CP, SOB, HA, dizziness

## 2021-11-17 NOTE — DISCHARGE NOTE PROVIDER - HOSPITAL COURSE
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 Pt hx COVID 12/20 tx with monoclonal antibodies - prior hx lumbar fusion with hardware , IGG  deficiency, Leukopenia - NO BP / IV left arm   Patient s/p Redo right Vats, RLL wedge resection x2, RML wedge resection on 11/16/21.  Patient stable for discharge home when chest tube removed.

## 2021-11-17 NOTE — PROGRESS NOTE ADULT - SUBJECTIVE AND OBJECTIVE BOX
Anesthesia Pain Management Service    SUBJECTIVE: Patient states the IV PCA helps her with her pain and her pain got better since the chest tube was removed.  Pain Scale Score	At rest: _2/10__ 	With Activity: ___ 	[X ] Refer to charted pain scores    THERAPY:    [ ] IV PCA Morphine		[ ] 5 mg/mL	[ ] 1 mg/mL  [X ] IV PCA Hydromorphone	[ ] 5 mg/mL	[X ] 1 mg/mL  [ ] IV PCA Fentanyl		[ ] 50 micrograms/mL    Demand dose __0.2_ lockout __6_ (minutes) Continuous Rate _0__ Total: _2.2__   mg used (in past 24 hrs)      MEDICATIONS  (STANDING):  budesonide  80 MICROgram(s)/formoterol 4.5 MICROgram(s) Inhaler 2 Puff(s) Inhalation two times a day  famotidine    Tablet 20 milliGRAM(s) Oral daily  glycopyrrolate 9 MICROgram(s)/formoterol 4.8 MICROgram(s) Inhaler 2 Puff(s) Inhalation two times a day  heparin   Injectable 5000 Unit(s) SubCutaneous every 8 hours  lactated ringers. 1000 milliLiter(s) (30 mL/Hr) IV Continuous <Continuous>  letrozole 2.5 milliGRAM(s) Oral daily  levothyroxine 75 MICROGram(s) Oral daily  montelukast 10 milliGRAM(s) Oral daily    MEDICATIONS  (PRN):  acetaminophen     Tablet .. 650 milliGRAM(s) Oral every 6 hours PRN Mild Pain (1 - 3)  oxyCODONE    IR 5 milliGRAM(s) Oral every 6 hours PRN Moderate Pain (4 - 6)  oxyCODONE    IR 10 milliGRAM(s) Oral every 6 hours PRN Severe Pain (7 - 10)      OBJECTIVE: Patient laying in bed.    Sedation Score:	[ X] Alert	[ ] Drowsy 	[ ] Arousable	[ ] Asleep	[ ] Unresponsive    Side Effects:	[X ] None	[ ] Nausea	[ ] Vomiting	[ ] Pruritus  		[ ] Other:    Vital Signs Last 24 Hrs  T(C): 36.7 (17 Nov 2021 12:16), Max: 37.1 (17 Nov 2021 00:33)  T(F): 98.1 (17 Nov 2021 12:16), Max: 98.7 (17 Nov 2021 00:33)  HR: 72 (17 Nov 2021 12:16) (66 - 101)  BP: 140/78 (17 Nov 2021 12:16) (122/76 - 140/78)  BP(mean): 99 (16 Nov 2021 15:00) (87 - 102)  RR: 18 (17 Nov 2021 12:16) (16 - 27)  SpO2: 97% (17 Nov 2021 12:16) (93% - 99%)    ASSESSMENT/ PLAN    Therapy to  be:	[ ] Continue   [ X] Discontinued   [X ] Change to prn Analgesics    Documentation and Verification of current medications:   [X] Done	[ ] Not done, not elligible    Comments: IV PCA discontinued and PRN Oral/IV opioids and/or Adjuvant non-opioid medication  ordered by primary team.    Progress Note written now but Patient was seen earlier.

## 2021-11-17 NOTE — DISCHARGE NOTE PROVIDER - NSDCFUADDINST_GEN_ALL_CORE_FT
Please walk 4-5 x per day; increase as tolerated. You may climb stairs. Continue to use the incentive spirometer.   You may keep wounds uncovered. Please shower daily with soap and water. The suture will be removed in the office at the follow up appointment.   Please call the office at [phone] if you have fevers, chills, worsening shortness of breath, chest pain, warmth, redness or purulent discharge from the wound.

## 2021-11-17 NOTE — DISCHARGE NOTE PROVIDER - NSDCFUADDAPPT_GEN_ALL_CORE_FT
Follow up with Dr. Huerta in 7-10 days  Chest x-ray 1-2 days prior to appointment with Dr. Huerta  Follow up with primary care provider in one week

## 2021-11-17 NOTE — DISCHARGE NOTE PROVIDER - NSDCMRMEDTOKEN_GEN_ALL_CORE_FT
Arnuity Ellipta 100 mcg inhalation powder: 1 puff(s) inhaled every 24 hours  azithromycin 500 mg oral tablet: 1 tab(s) orally once a day - M-W-Friday   Bevespi Aerosphere 9 mcg-4.8 mcg/inh inhalation aerosol: 2 puff(s) inhaled 2 times a day  budesonide 0.5 mg/2 mL inhalation suspension: 2 milliliter(s) inhaled once a day, As Needed  Dupixent 300 mg/2 mL subcutaneous solution:   famotidine 40 mg oral tablet: 1 tab(s) orally once a day (at bedtime)  gabapentin 100 mg oral capsule: 1 cap(s) orally 3 times a day prn   Gammagard: 120 milliliter(s) by continuous subcutaneous infusion once a week, friday  ipratropium bromide 0.02%: 1 dose(s) by nebulizer once, As Needed  letrozole 2.5 mg oral tablet: 1 tab(s) orally once a day  levothyroxine 75 mcg (0.075 mg) oral tablet: 1 tab(s) orally once a day  metFORMIN 500 mg oral tablet: 1 tab po am and 2 tabs po pm   montelukast 10 mg oral tablet: 1 tab(s) orally once a day  montelukast 10 mg oral tablet:  orally once a day  omeprazole 40 mg oral delayed release capsule: 1 cap(s) orally once a day  Ventolin HFA 90 mcg/inh inhalation aerosol: 2 puff(s) inhaled 4 times a day, As Needed  Vitamin D2 50,000 intl units (1.25 mg) oral capsule: 1 cap(s) orally once a week, friday   Arnuity Ellipta 100 mcg inhalation powder: 1 puff(s) inhaled every 24 hours  azithromycin 500 mg oral tablet: 1 tab(s) orally once a day - M-W-Friday   Bevespi Aerosphere 9 mcg-4.8 mcg/inh inhalation aerosol: 2 puff(s) inhaled 2 times a day  budesonide 0.5 mg/2 mL inhalation suspension: 2 milliliter(s) inhaled once a day, As Needed  Dupixent 300 mg/2 mL subcutaneous solution:   famotidine 40 mg oral tablet: 1 tab(s) orally once a day (at bedtime)  gabapentin 100 mg oral capsule: 1 cap(s) orally 3 times a day prn   Gammagard: 120 milliliter(s) by continuous subcutaneous infusion once a week, friday  ipratropium bromide 0.02%: 1 dose(s) by nebulizer once, As Needed  letrozole 2.5 mg oral tablet: 1 tab(s) orally once a day  levothyroxine 75 mcg (0.075 mg) oral tablet: 1 tab(s) orally once a day  metFORMIN 500 mg oral tablet: 1 tab po am and 2 tabs po pm   montelukast 10 mg oral tablet: 1 tab(s) orally once a day  omeprazole 40 mg oral delayed release capsule: 1 cap(s) orally once a day  oxyCODONE 5 mg oral tablet: 1 tab(s) orally every 4 hours as needed for moderate to severe pain MDD:6  Tylenol 325 mg oral tablet: 2 tab(s) orally every 4 hours, As Needed for mild pain  Ventolin HFA 90 mcg/inh inhalation aerosol: 2 puff(s) inhaled 4 times a day, As Needed  Vitamin D2 50,000 intl units (1.25 mg) oral capsule: 1 cap(s) orally once a week, friday

## 2021-11-19 ENCOUNTER — RX RENEWAL (OUTPATIENT)
Age: 56
End: 2021-11-19

## 2021-11-21 LAB
CULTURE RESULTS: SIGNIFICANT CHANGE UP
CULTURE RESULTS: SIGNIFICANT CHANGE UP
SPECIMEN SOURCE: SIGNIFICANT CHANGE UP
SPECIMEN SOURCE: SIGNIFICANT CHANGE UP

## 2021-11-23 LAB — SURGICAL PATHOLOGY STUDY: SIGNIFICANT CHANGE UP

## 2021-11-26 ENCOUNTER — OUTPATIENT (OUTPATIENT)
Dept: OUTPATIENT SERVICES | Facility: HOSPITAL | Age: 56
LOS: 1 days | End: 2021-11-26
Payer: COMMERCIAL

## 2021-11-26 ENCOUNTER — RESULT REVIEW (OUTPATIENT)
Age: 56
End: 2021-11-26

## 2021-11-26 ENCOUNTER — APPOINTMENT (OUTPATIENT)
Dept: RADIOLOGY | Facility: CLINIC | Age: 56
End: 2021-11-26
Payer: COMMERCIAL

## 2021-11-26 DIAGNOSIS — M71.30 OTHER BURSAL CYST, UNSPECIFIED SITE: Chronic | ICD-10-CM

## 2021-11-26 DIAGNOSIS — Z00.00 ENCOUNTER FOR GENERAL ADULT MEDICAL EXAMINATION WITHOUT ABNORMAL FINDINGS: ICD-10-CM

## 2021-11-26 DIAGNOSIS — Z98.82 BREAST IMPLANT STATUS: Chronic | ICD-10-CM

## 2021-11-26 DIAGNOSIS — Z98.1 ARTHRODESIS STATUS: Chronic | ICD-10-CM

## 2021-11-26 DIAGNOSIS — Z98.89 OTHER SPECIFIED POSTPROCEDURAL STATES: Chronic | ICD-10-CM

## 2021-11-26 DIAGNOSIS — Z90.10 ACQUIRED ABSENCE OF UNSPECIFIED BREAST AND NIPPLE: Chronic | ICD-10-CM

## 2021-11-26 PROCEDURE — 71046 X-RAY EXAM CHEST 2 VIEWS: CPT

## 2021-11-26 PROCEDURE — 71046 X-RAY EXAM CHEST 2 VIEWS: CPT | Mod: 26

## 2021-11-30 ENCOUNTER — RX RENEWAL (OUTPATIENT)
Age: 56
End: 2021-11-30

## 2021-12-01 ENCOUNTER — APPOINTMENT (OUTPATIENT)
Dept: THORACIC SURGERY | Facility: CLINIC | Age: 56
End: 2021-12-01
Payer: COMMERCIAL

## 2021-12-01 PROCEDURE — 99212 OFFICE O/P EST SF 10 MIN: CPT

## 2021-12-02 ENCOUNTER — APPOINTMENT (OUTPATIENT)
Dept: PULMONOLOGY | Facility: CLINIC | Age: 56
End: 2021-12-02
Payer: COMMERCIAL

## 2021-12-02 ENCOUNTER — NON-APPOINTMENT (OUTPATIENT)
Age: 56
End: 2021-12-02

## 2021-12-02 VITALS
WEIGHT: 174 LBS | RESPIRATION RATE: 16 BRPM | DIASTOLIC BLOOD PRESSURE: 78 MMHG | OXYGEN SATURATION: 96 % | BODY MASS INDEX: 29.71 KG/M2 | SYSTOLIC BLOOD PRESSURE: 122 MMHG | HEIGHT: 64 IN | TEMPERATURE: 96.6 F | HEART RATE: 108 BPM

## 2021-12-02 PROCEDURE — 99214 OFFICE O/P EST MOD 30 MIN: CPT | Mod: 25

## 2021-12-02 PROCEDURE — 94010 BREATHING CAPACITY TEST: CPT

## 2021-12-02 PROCEDURE — 95012 NITRIC OXIDE EXP GAS DETER: CPT

## 2021-12-02 NOTE — ASSESSMENT
[FreeTextEntry1] : Ms. Kunz is a 56 year old female with a history of chronic sinus disease, GERD, Hashimoto's thyroiditis, BOOP, IgG deficiency, recent iron deficient anemia, asthma (eosinophilic) coming in to the office for pulmonary reevaluation.  She is s/p mastectomy and s/p hospitalization for "expander" Staph infection. She is now recovered and s/p Taxol, Herceptin and Perjetta RT  for her breast cancer. She is now s/p radiation therapy 7/2018, and presents to the office for a pulmonary follow up s/p COVID-19 pneumonitis with residual parenchymal changes 12/2020 - ?Active BOOP- symptomatic now \par  \par problem 1a: original abnormal chest CT s/p VATS - c/w BOOP - (active) 10/2021 \par -BOOP, diagnosed by VATS in 2013 / 2019; 2021 \par -add Prednisone 20 mg until controlled \par Information sheet given to the patient to be reviewed, this medication is never to be used without consulting the prescribing physician. Proper dietary restraint is necessary specifically salt containing foods, if any reaction may occur should be reported. 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 Risks, benefits were reviewed in detail. The guidelines for abnormalities include follow up CT scans at various intervals which could range from 6 weeks to 1 year intervals. If there is a change for the worse then consideration for a biopsy will be considered if you are a candidate. Second opinion evaluation with a thoracic surgeon or an interventional radiologist could be offered. \par \par Problem 1C: s/p Covid-19 Pneumonitis (12/2020) - resolved- Vaccinated x3\par -s/p a course of Prednisone; 30 mg for 5 days, then 20 mg for 5 days, then 10 mg for 5 days (completed 1/20/2021) \par Information sheet given to the patient to be reviewed, this medication is never to be used without consulting the prescribing physician. Proper dietary restraint is necessary specifically salt containing foods, if any reaction may occur should be reported. \par \par -s/p monoclonal antibody infusions\par \par \par COVID-19 precautionary Immune Support Recommendations:\par -OTC Vitamin C 1000mg BID \par -OTC Quercetin 500mg BID \par -OTC Zinc 50 mg per day \par -OTC Melatonin 5 mg a night \par -OTC Vitamin D 2000mg per day \par -OTC Tonic Water 8oz per day\par -Water 0.5-1 gallon per day\par \par Additional Support Supplements: \par -Liposomal Glutathione 500 mg BID\par -SPM 1 tablet BID \par -Berberine 1000 mg BID  \par \par problem 2: BOOP - confirmed-"active"\par -add Prednisone 20 mg a day until controlled \par -diagnosed by right VATs \par -follow up chest CT - 2022 \par -continue Zithromax 500 mg M/W/F (continue)\par - set up Rheumatologic evaluation - Dr. Ceballos al for additional Steroid sparing medications \par \par problem 3b: eosinophilic  asthma\par -off Nucala ; Consider Fasenra or Dupixent. Fasenra on 9/17/2020 \par -Dupixent since 6/2021\par -The safety and efficacy of Nucala was established in three double-blind, randomized, placebo-controlled trials in patients with severe asthma. Compared to a placebo, patients with severe asthma receiving Nucala had fewer exacerbation requiring hospitalization and/or emergency department visits, and a longer time to first exacerbation.  In addition, patients with severe asthma receiving Nucala experienced greater reductions in their daily maintenance oral corticosteroid dose, while maintaining asthma control compared with patients receiving placebo. Treatment with Nucala did not result in a significant improvement in lung function, as measured by the volume of air exhaled by patients in one second. The most common side effects include: headache, injection site reactions, back pain, weakness, and fatigue; hypersensitivity reactions can occur within hours or days including swelling of the face, mouth, and tongue, fainting, dizziness, hives, breathing problems, and rash; herpes zoster infections have occurred. The drug is a monoclonal antibody that inhibits interleukin -5 which helps regular eosinophils, a type of white blood cell that contributes to asthma. The over-production of eosinophils can cause inflammation in the lungs, increasing the frequency of asthma attacks. Patients must also take other medications, including high dose inhaled corticosteroids and at least one additional asthma drug. \par  \par problem 3C: steroid Dependent Asthma\par - Dupixent since 6/2021\par Dupixent is a prescription medicine used with other asthma medicines for the maintenance treatment of moderate-to-severe asthma in people aged 12 years and older whose asthma is not controlled with their current asthma medicines. Dupixent helps prevent severe asthma attacks (exacerbations) and can improve your breathing. Dupixent may also help reduce the amount of oral corticosteroids you need while preventing severe asthma attacks and improving your breathing. Dupixent is not used to treat sudden breathing problems. Risks and side effect of Dupixent were discussed and reviewed with patient.\par \par problem 4: allergic rhinitis\par -recommended Xlear or nasal saline, Navage.\par -recommended Sinugator \par -Environmental measures for allergies were encouraged including mattress and pillow cover, air purifier, and environmental controls. \par \par problem 5: GERD/LPR\par -continue Pepcid 40 mg QHS \par - continue Omeprazole 20 mg or 40 mg before breakfast\par - Recommended Apple cider vineger tablets. \par -Rule of 2s: avoid eating too much, eating too late, eating too spicy, eating two hours before bed\par -Things to avoid including overeating, spicy foods, tight clothing, eating within three hours of bed, this list is not all inclusive. \par -For treatment of reflux, possible options discussed including diet control, H2 blockers, PPIs, as well as coating motility agents discussed as treatment options. Timing of meals and proximity of last meal to sleep were discussed. If symptoms persist, a formal gastrointestinal evaluation is needed. \par \par problem 6: sensory neuropathic cough (controlled)\par -on Neurontin 100 Qs4\par -off Amitriptyline 10 mg up to TID, QHS \par -s/p Baclofen 10 mg pre-meal and QHS\par -continue Tessalon Perles 200 mg qHS\par -Sensory neuropathic cough is an etiology of cough that is often realized once someone has been ruled out for common disease such as: asthma, COPD, eosinophilic bronchitis, bronchiectasis, post nasal drip, and GERD. It sometimes develops following a URI, herpes zoster outbreak in pharynx or thyroid or cervical spine injury. However, many patients have no identifiable antecedent explanation. \par \par problem 7: r/o diaphragm dysfunction\par -fluoroscopy of the diaphragm was all normal\par \par problem 8: low immunity \par -continue to use Gammagard weekly \par -recommended early intervention  \par -s/p Prevnar 13 vaccine / Pneumovax\par \par problem 9: health maintenance\par -s/p Covid 19 vaccine x 3\par -s/p 2021 flu shot\par -s/p 1st Shingrix vaccine, s/p 2nd shot\par -recommended strep pneumonia vaccines: Prevnar-13 vaccine, followed by Pneumo vaccine 23 on year following\par -recommended early intervention for URIs\par -recommended osteoporosis evaluations\par -recommended early dermatological evaluations\par -recommended after the age of 50 to the age of 70, colonoscopy every 5 years\par \par F/U in 3 months with SPI and NiOx\par She is encouraged to call with any changes, concerns, or questions

## 2021-12-02 NOTE — PHYSICAL EXAM
[No Acute Distress] : no acute distress [Well Nourished] : well nourished [Well Groomed] : well groomed [No Deformities] : no deformities [Well Developed] : well developed [Normal Oropharynx] : normal oropharynx [III] : Mallampati Class: III [Normal Appearance] : normal appearance [No Neck Mass] : no neck mass [Normal Rate/Rhythm] : normal rate/rhythm [Normal S1, S2] : normal s1, s2 [No Murmurs] : no murmurs [No Resp Distress] : no resp distress [Clear to Auscultation Bilaterally] : clear to auscultation bilaterally [No Abnormalities] : no abnormalities [Benign] : benign [Normal Gait] : normal gait [No Clubbing] : no clubbing [No Cyanosis] : no cyanosis [No Edema] : no edema [FROM] : FROM [Normal Color/ Pigmentation] : normal color/ pigmentation [No Focal Deficits] : no focal deficits [Oriented x3] : oriented x3 [Normal Affect] : normal affect [TextBox_68] : I:E 1:3, mild crackles at the right base

## 2021-12-02 NOTE — ADDENDUM
[FreeTextEntry1] : Documented by Sloane Benítez acting as a scribe for Dr. Munir Malin on 12/02/2021 \par \par All medical record entries made by the Scribe were at my, Dr. Munir Malin's, direction and personally dictated by me on 12/02/2021 . I have reviewed the chart and agree that the record accurately reflects my personal performance of the history, physical exam, assessment and plan. I have also personally directed, reviewed, and agree with the discharge instructions.

## 2021-12-02 NOTE — HISTORY OF PRESENT ILLNESS
[FreeTextEntry1] : Ms. Kunz is a 56 year old female with a history of abnormal chest CT, severe persistent asthma, BOOP, cough, eosinophilic asthma/PNA, GERD, lung nodules, PND, and SOB and was presents into the office for a follow up. Her chief complaint is \par - she notes in august she started prednisone and tapered off, and then she had a CT done. \par - she notes she was having bad coughing for a long time \par - she notes she cant stand prednisone, she notes it makes her gain weight and swelling in her hands. \par - last night she had to take prednisone due to her coughing a lot, she could not talk at all due to her coughing. \par - no chest pains / pressure \par - no dizziness \par - no swallowing issues \par - her bowel movements depends\par - good sense of smell / taste \par - she notes she has not seen a rheumatologist. \par - she notes shes on Zithromax \par - she has taken Biaxin in the past but it bothers her stomach \par - currently on Dupixent, her next dose is Saturday. \par - She  denies any visual issues, headaches, nausea, vomiting, fever, chills, sweats, chest pains, chest pressure, diarrhea, constipation, dysphagia, myalgia, dizziness, leg swelling, leg pain, itchy eyes, itchy ears, heartburn, reflux, or sour taste in the mouth.\par

## 2021-12-02 NOTE — PROCEDURE
[FreeTextEntry1] : PFT reveals mild restrictive flows, with an FEV1 of   1.54L, which is  57% of predicted, with normal flow volume loop\par \par FENO was 8; normal value being less than 25\par Fractional exhaled nitric oxide (FENO) is regarded as a simple, noninvasive method for assessing eosinophilic airway inflammation. Produced by a variety of cells within the lung, nitric oxide (NO) concentrations are generally low in healthy individuals. However, high concentrations of NO appear to be involved in nonspecific host defense mechanisms and chronic inflammatory diseases such as asthma. The American Thoracic Society (ATS) therefore has recommended using FENO to aid in the diagnosis and monitoring of eosinophilic airway inflammation and asthma, and for identifying steroid responsive individuals whose chronic respiratory symptoms may be airway inflammation.\par \par \par CT (OCT.26.2021) IMPRESSION: 1. SINCE 2/20/2021, THE B/L NODULES HAVE INCREASED IN SIZE AND NUMBER. \par

## 2021-12-14 ENCOUNTER — RX RENEWAL (OUTPATIENT)
Age: 56
End: 2021-12-14

## 2021-12-15 ENCOUNTER — APPOINTMENT (OUTPATIENT)
Dept: PULMONOLOGY | Facility: CLINIC | Age: 56
End: 2021-12-15
Payer: COMMERCIAL

## 2021-12-15 PROCEDURE — 94727 GAS DIL/WSHOT DETER LNG VOL: CPT

## 2021-12-15 PROCEDURE — 94729 DIFFUSING CAPACITY: CPT

## 2021-12-15 PROCEDURE — 95012 NITRIC OXIDE EXP GAS DETER: CPT

## 2021-12-15 PROCEDURE — 94010 BREATHING CAPACITY TEST: CPT

## 2021-12-20 DIAGNOSIS — Z20.822 CONTACT WITH AND (SUSPECTED) EXPOSURE TO COVID-19: ICD-10-CM

## 2021-12-22 ENCOUNTER — NON-APPOINTMENT (OUTPATIENT)
Age: 56
End: 2021-12-22

## 2021-12-22 LAB — SARS-COV-2 N GENE NPH QL NAA+PROBE: NOT DETECTED

## 2021-12-27 ENCOUNTER — OUTPATIENT (OUTPATIENT)
Dept: OUTPATIENT SERVICES | Facility: HOSPITAL | Age: 56
LOS: 1 days | End: 2021-12-27
Payer: COMMERCIAL

## 2021-12-27 ENCOUNTER — APPOINTMENT (OUTPATIENT)
Dept: CT IMAGING | Facility: CLINIC | Age: 56
End: 2021-12-27
Payer: COMMERCIAL

## 2021-12-27 DIAGNOSIS — Z98.82 BREAST IMPLANT STATUS: Chronic | ICD-10-CM

## 2021-12-27 DIAGNOSIS — Z90.10 ACQUIRED ABSENCE OF UNSPECIFIED BREAST AND NIPPLE: Chronic | ICD-10-CM

## 2021-12-27 DIAGNOSIS — Z00.8 ENCOUNTER FOR OTHER GENERAL EXAMINATION: ICD-10-CM

## 2021-12-27 DIAGNOSIS — R91.8 OTHER NONSPECIFIC ABNORMAL FINDING OF LUNG FIELD: ICD-10-CM

## 2021-12-27 DIAGNOSIS — M71.30 OTHER BURSAL CYST, UNSPECIFIED SITE: Chronic | ICD-10-CM

## 2021-12-27 DIAGNOSIS — Z98.89 OTHER SPECIFIED POSTPROCEDURAL STATES: Chronic | ICD-10-CM

## 2021-12-27 DIAGNOSIS — Z98.1 ARTHRODESIS STATUS: Chronic | ICD-10-CM

## 2021-12-27 PROCEDURE — 71250 CT THORAX DX C-: CPT | Mod: 26

## 2021-12-27 PROCEDURE — 71250 CT THORAX DX C-: CPT

## 2021-12-28 ENCOUNTER — APPOINTMENT (OUTPATIENT)
Dept: PULMONOLOGY | Facility: CLINIC | Age: 56
End: 2021-12-28
Payer: COMMERCIAL

## 2021-12-28 PROCEDURE — 99214 OFFICE O/P EST MOD 30 MIN: CPT | Mod: 95

## 2021-12-28 RX ORDER — CLINDAMYCIN HYDROCHLORIDE 150 MG/1
150 CAPSULE ORAL
Qty: 36 | Refills: 0 | Status: DISCONTINUED | COMMUNITY
Start: 2021-08-02

## 2021-12-28 RX ORDER — BENRALIZUMAB 30 MG/ML
30 INJECTION, SOLUTION SUBCUTANEOUS
Qty: 3 | Refills: 1 | Status: DISCONTINUED | COMMUNITY
Start: 2020-08-18 | End: 2021-12-28

## 2021-12-28 RX ORDER — OXYCODONE 5 MG/1
5 TABLET ORAL
Qty: 30 | Refills: 0 | Status: DISCONTINUED | COMMUNITY
Start: 2021-11-17

## 2021-12-28 RX ORDER — IVERMECTIN 3 MG/1
3 TABLET ORAL
Qty: 12 | Refills: 0 | Status: DISCONTINUED | COMMUNITY
Start: 2020-12-30

## 2021-12-28 RX ORDER — FAMOTIDINE 40 MG/1
40 TABLET, FILM COATED ORAL
Qty: 90 | Refills: 1 | Status: ACTIVE | COMMUNITY
Start: 2019-12-10 | End: 1900-01-01

## 2021-12-28 RX ORDER — DOXYCYCLINE 100 MG/1
100 TABLET, FILM COATED ORAL
Qty: 20 | Refills: 0 | Status: DISCONTINUED | COMMUNITY
Start: 2021-07-29

## 2021-12-28 RX ORDER — MUPIROCIN 20 MG/G
2 OINTMENT TOPICAL
Qty: 22 | Refills: 0 | Status: DISCONTINUED | COMMUNITY
Start: 2021-09-26

## 2021-12-28 NOTE — REASON FOR VISIT
[Follow-Up] : a follow-up visit [FreeTextEntry1] : video call - /p COVID-19 infection 12/2020, abnormal chest CT, severe persistent asthma, BOOP, cough, eosinophilic asthma/PNA, GERD, lung nodules, PND, and SOB

## 2021-12-28 NOTE — PROCEDURE
[FreeTextEntry1] : Full PFT (12/15/2021) revealed normal flows, with a FEV1 of 1.8L, which is 71% of predicted, mildly reduced lung volumes, and a mild-moderately reduced diffusion of 12.2, which is 55% of predicted, with a normal flow volume loop \par \par FENO (12/15/2021) was 12; a normal value being less than 25. Fractional exhaled nitric oxide (FENO) is regarded as a simple, noninvasive method for assessing eosinophilic airway inflammation. Produced by a variety of cells within the lung, nitric oxide (NO) concentrations are generally low in healthy individuals. However, high concentrations of NO appear to be involved in nonspecific host defense mechanisms and chronic inflammatory  diseases such as asthma. The American Thoracic Society (ATS) therefore recommended using FENO to aid in the diagnosis and monitoring of eosinophilic airway inflammation and asthma, and for identifying steroid responsive individuals whose chronic respiratory symptoms may be caused by airway inflammation

## 2021-12-28 NOTE — HISTORY OF PRESENT ILLNESS
[Home] : at home, [unfilled] , at the time of the visit. [Medical Office: ([facility])___] : at the medical office located in  [Verbal consent obtained from patient] : the patient, [unfilled] [FreeTextEntry1] : Ms. Kunz is a 56 year old female with a history of abnormal chest CT, severe persistent asthma, BOOP, cough, eosinophilic asthma/PNA, GERD, lung nodules, PND, and SOB and was presents into the office via video call for a follow up. Her chief complaint is \par -she notes feeling fine in general\par -she wants to get off Prednisone (currently on 20 mg QDay)\par -she notes she is going to see Dr. Benjamin 1/12/2022\par -she notes she is not coughing much on the prednisone but does sometimes when she wakes up\par -she feels under control with the prednisone but wants to come off it\par -she notes she does not have COVID\par -she notes she needs more Flonase\par \par -patient denies any headaches, nausea, vomiting, fever, chills, sweats, chest pain, chest pressure, palpitations, wheezing, fatigue, diarrhea, constipation, dysphagia, myalgias, dizziness, leg swelling, leg pain, itchy eyes, itchy ears, heartburn, reflux or sour taste in the mouth

## 2021-12-28 NOTE — ASSESSMENT
[FreeTextEntry1] : Ms. Kunz is a 56 year old female with a history of chronic sinus disease, GERD, Hashimoto's thyroiditis, BOOP, IgG deficiency, recent iron deficient anemia, asthma (eosinophilic) coming in to the office for pulmonary reevaluation.  She is s/p mastectomy and s/p hospitalization for "expander" Staph infection. She is now recovered and s/p Taxol, Herceptin and Perjetta RT  for her breast cancer. She is now s/p radiation therapy 7/2018, and presents to the office via video call for a pulmonary follow up s/p COVID-19 pneumonitis with residual parenchymal changes 12/2020 - (+)Active BOOP- recontrolled 11/16/ VATS - on prednisone 20 mg QDay / stable\par  \par problem 1a: original abnormal chest CT s/p VATS - c/w BOOP - (active) 10/2021 \par -BOOP, diagnosed by VATS in 2013 / 2019; 2021 \par -on Prednisone 20 mg - can drop to 15 mg QDay until rheum visit\par Information sheet given to the patient to be reviewed, this medication is never to be used without consulting the prescribing physician. Proper dietary restraint is necessary specifically salt containing foods, if any reaction may occur should be reported. 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 Risks, benefits were reviewed in detail. The guidelines for abnormalities include follow up CT scans at various intervals which could range from 6 weeks to 1 year intervals. If there is a change for the worse then consideration for a biopsy will be considered if you are a candidate. Second opinion evaluation with a thoracic surgeon or an interventional radiologist could be offered. \par \par Problem 1C: s/p Covid-19 Pneumonitis (12/2020) - resolved- Vaccinated x3\par -s/p a course of Prednisone; 30 mg for 5 days, then 20 mg for 5 days, then 10 mg for 5 days (completed 1/20/2021) \par Information sheet given to the patient to be reviewed, this medication is never to be used without consulting the prescribing physician. Proper dietary restraint is necessary specifically salt containing foods, if any reaction may occur should be reported. \par \par -s/p monoclonal antibody infusions\par \par \par COVID-19 precautionary Immune Support Recommendations:\par -OTC Vitamin C 1000mg BID \par -OTC Quercetin 500mg BID \par -OTC Zinc 50 mg per day \par -OTC Melatonin 5 mg a night \par -OTC Vitamin D 2000mg per day \par -OTC Tonic Water 8oz per day\par -Water 0.5-1 gallon per day\par \par Additional Support Supplements: \par -Liposomal Glutathione 500 mg BID\par -SPM 1 tablet BID \par -Berberine 1000 mg BID  \par \par problem 2: BOOP - confirmed-"active"\par -continue Prednisone 20 mg a day - reduce to 15 mg QDay\par -diagnosed by right VATs \par -follow up chest CT - 2022 \par -continue Zithromax 500 mg M/W/F (continue)\par - Rheumatologic evaluation - Dr. Benjamin et al for additional Steroid sparing medications 1/2022\par \par problem 3b: eosinophilic  asthma\par -off Nucala ; Consider Fasenra or Dupixent. Fasenra on 9/17/2020 \par -Dupixent since 6/2021\par -The safety and efficacy of Nucala was established in three double-blind, randomized, placebo-controlled trials in patients with severe asthma. Compared to a placebo, patients with severe asthma receiving Nucala had fewer exacerbation requiring hospitalization and/or emergency department visits, and a longer time to first exacerbation.  In addition, patients with severe asthma receiving Nucala experienced greater reductions in their daily maintenance oral corticosteroid dose, while maintaining asthma control compared with patients receiving placebo. Treatment with Nucala did not result in a significant improvement in lung function, as measured by the volume of air exhaled by patients in one second. The most common side effects include: headache, injection site reactions, back pain, weakness, and fatigue; hypersensitivity reactions can occur within hours or days including swelling of the face, mouth, and tongue, fainting, dizziness, hives, breathing problems, and rash; herpes zoster infections have occurred. The drug is a monoclonal antibody that inhibits interleukin -5 which helps regular eosinophils, a type of white blood cell that contributes to asthma. The over-production of eosinophils can cause inflammation in the lungs, increasing the frequency of asthma attacks. Patients must also take other medications, including high dose inhaled corticosteroids and at least one additional asthma drug. \par  \par problem 3C: steroid Dependent Asthma\par - Dupixent since 6/2021\par Dupixent is a prescription medicine used with other asthma medicines for the maintenance treatment of moderate-to-severe asthma in people aged 12 years and older whose asthma is not controlled with their current asthma medicines. Dupixent helps prevent severe asthma attacks (exacerbations) and can improve your breathing. Dupixent may also help reduce the amount of oral corticosteroids you need while preventing severe asthma attacks and improving your breathing. Dupixent is not used to treat sudden breathing problems. Risks and side effect of Dupixent were discussed and reviewed with patient.\par \par problem 4: allergic rhinitis\par -recommended Xlear or nasal saline, Navage.\par -recommended Sinugator \par -Environmental measures for allergies were encouraged including mattress and pillow cover, air purifier, and environmental controls. \par \par problem 5: GERD/LPR\par -continue Pepcid 40 mg QHS \par - continue Omeprazole 20 mg or 40 mg before breakfast\par - Recommended Apple cider vineger tablets. \par -Rule of 2s: avoid eating too much, eating too late, eating too spicy, eating two hours before bed\par -Things to avoid including overeating, spicy foods, tight clothing, eating within three hours of bed, this list is not all inclusive. \par -For treatment of reflux, possible options discussed including diet control, H2 blockers, PPIs, as well as coating motility agents discussed as treatment options. Timing of meals and proximity of last meal to sleep were discussed. If symptoms persist, a formal gastrointestinal evaluation is needed. \par \par problem 6: sensory neuropathic cough (controlled)\par -on Neurontin 100 Qs4\par -off Amitriptyline 10 mg up to TID, QHS \par -s/p Baclofen 10 mg pre-meal and QHS\par -continue Tessalon Perles 200 mg qHS\par -Sensory neuropathic cough is an etiology of cough that is often realized once someone has been ruled out for common disease such as: asthma, COPD, eosinophilic bronchitis, bronchiectasis, post nasal drip, and GERD. It sometimes develops following a URI, herpes zoster outbreak in pharynx or thyroid or cervical spine injury. However, many patients have no identifiable antecedent explanation. \par \par problem 7: r/o diaphragm dysfunction\par -fluoroscopy of the diaphragm was all normal\par \par problem 8: low immunity \par -continue to use Gammagard weekly \par -recommended early intervention  \par -s/p Prevnar 13 vaccine / Pneumovax\par \par problem 9: health maintenance\par -s/p Covid 19 vaccine x 3\par -s/p 2021 flu shot\par -s/p 1st Shingrix vaccine, s/p 2nd shot\par -recommended strep pneumonia vaccines: Prevnar-13 vaccine, followed by Pneumo vaccine 23 on year following\par -recommended early intervention for URIs\par -recommended osteoporosis evaluations\par -recommended early dermatological evaluations\par -recommended after the age of 50 to the age of 70, colonoscopy every 5 years\par \par F/U in 3 months with SPI and NiOx\par She is encouraged to call with any changes, concerns, or questions

## 2021-12-28 NOTE — ADDENDUM
[FreeTextEntry1] : Documented by Dorian Hightower acting as a scribe for Dr. Munir Malin on 12/28/2021\par \par All medical record entries made by the scribe were at my, Dr. Munir Malin's, direction and personally dictated by me on 12/28/2021. I have reviewed the chart and agree that the record accurately reflects my personal performance of the history, physical exam, assessment, and plan. I have also personally directed, reviewed, and agree with the discharge instructions.

## 2022-01-10 ENCOUNTER — APPOINTMENT (OUTPATIENT)
Dept: PULMONOLOGY | Facility: CLINIC | Age: 57
End: 2022-01-10

## 2022-01-12 ENCOUNTER — LABORATORY RESULT (OUTPATIENT)
Age: 57
End: 2022-01-12

## 2022-01-12 ENCOUNTER — APPOINTMENT (OUTPATIENT)
Dept: RHEUMATOLOGY | Facility: CLINIC | Age: 57
End: 2022-01-12
Payer: COMMERCIAL

## 2022-01-12 ENCOUNTER — TRANSCRIPTION ENCOUNTER (OUTPATIENT)
Age: 57
End: 2022-01-12

## 2022-01-12 ENCOUNTER — NON-APPOINTMENT (OUTPATIENT)
Age: 57
End: 2022-01-12

## 2022-01-12 VITALS
OXYGEN SATURATION: 95 % | SYSTOLIC BLOOD PRESSURE: 127 MMHG | DIASTOLIC BLOOD PRESSURE: 82 MMHG | WEIGHT: 174 LBS | HEIGHT: 64 IN | HEART RATE: 72 BPM | BODY MASS INDEX: 29.71 KG/M2

## 2022-01-12 DIAGNOSIS — Z79.899 OTHER LONG TERM (CURRENT) DRUG THERAPY: ICD-10-CM

## 2022-01-12 PROCEDURE — 36415 COLL VENOUS BLD VENIPUNCTURE: CPT

## 2022-01-12 PROCEDURE — 99205 OFFICE O/P NEW HI 60 MIN: CPT | Mod: 25

## 2022-01-14 LAB
ALBUMIN SERPL ELPH-MCNC: 4.7 G/DL
ALP BLD-CCNC: 104 U/L
ALT SERPL-CCNC: 14 U/L
ANION GAP SERPL CALC-SCNC: 14 MMOL/L
AST SERPL-CCNC: 18 U/L
BASOPHILS # BLD AUTO: 0.05 K/UL
BASOPHILS NFR BLD AUTO: 1 %
BILIRUB SERPL-MCNC: 0.5 MG/DL
BUN SERPL-MCNC: 16 MG/DL
C3 SERPL-MCNC: 168 MG/DL
C4 SERPL-MCNC: 36 MG/DL
CALCIUM SERPL-MCNC: 10.2 MG/DL
CENTROMERE IGG SER-ACNC: <0.2 CD:130001892
CHLORIDE SERPL-SCNC: 102 MMOL/L
CHROMATIN AB SERPL-ACNC: <0.2 AL
CO2 SERPL-SCNC: 24 MMOL/L
CREAT SERPL-MCNC: 0.88 MG/DL
CRP SERPL-MCNC: 7 MG/L
ENA RNP AB SER IA-ACNC: 0.3 AL
ENA SCL70 IGG SER IA-ACNC: <0.2 AL
ENA SM AB SER IA-ACNC: <0.2 AL
ENA SS-A AB SER IA-ACNC: 0.3 AL
ENA SS-B AB SER IA-ACNC: <0.2 AL
EOSINOPHIL # BLD AUTO: 0.12 K/UL
EOSINOPHIL NFR BLD AUTO: 2.4 %
ERYTHROCYTE [SEDIMENTATION RATE] IN BLOOD BY WESTERGREN METHOD: 17 MM/HR
HCT VFR BLD CALC: 48 %
HGB BLD-MCNC: 15.3 G/DL
IMM GRANULOCYTES NFR BLD AUTO: 0.4 %
LYMPHOCYTES # BLD AUTO: 1.55 K/UL
LYMPHOCYTES NFR BLD AUTO: 31.5 %
MAN DIFF?: NORMAL
MCHC RBC-ENTMCNC: 27.3 PG
MCHC RBC-ENTMCNC: 31.9 GM/DL
MCV RBC AUTO: 85.6 FL
MONOCYTES # BLD AUTO: 0.33 K/UL
MONOCYTES NFR BLD AUTO: 6.7 %
NEUTROPHILS # BLD AUTO: 2.85 K/UL
NEUTROPHILS NFR BLD AUTO: 58 %
PLATELET # BLD AUTO: 189 K/UL
POTASSIUM SERPL-SCNC: 4.2 MMOL/L
PROT SERPL-MCNC: 7.2 G/DL
RBC # BLD: 5.61 M/UL
RBC # FLD: 15 %
RHEUMATOID FACT SER QL: <10 IU/ML
SODIUM SERPL-SCNC: 140 MMOL/L
THYROGLOB AB SERPL-ACNC: <20 IU/ML
THYROPEROXIDASE AB SERPL IA-ACNC: 27.5 IU/ML
WBC # FLD AUTO: 4.92 K/UL

## 2022-01-17 LAB
ANA SER IF-ACNC: NEGATIVE
DSDNA AB SER-ACNC: <12 IU/ML
RNA POLYMERASE III IGG: 6 UNITS

## 2022-01-18 ENCOUNTER — APPOINTMENT (OUTPATIENT)
Dept: THORACIC SURGERY | Facility: CLINIC | Age: 57
End: 2022-01-18

## 2022-01-18 NOTE — CONSULT LETTER
[Dear  ___] : Dear  [unfilled], [Courtesy Letter:] : I had the pleasure of seeing your patient, [unfilled], in my office today. [Please see my note below.] : Please see my note below. [Sincerely,] : Sincerely, [FreeTextEntry2] : Dr. Malin\par Pulmonary

## 2022-01-18 NOTE — ASSESSMENT
[FreeTextEntry1] : 55 yo PMHX Breast Ca, Hashimotos, Asthma, and BOOP here for evaluation \par \par BOOP \par s/p multiple biopsies - requires steroid and here for steroid sparing agent perhaps.  s/p right upper lobe VATS 2013 > BOOP and cellular interstitial pneumonia with lymphoid hyperplasia.   s/p left lower lobe webge VATS 2019 > BOOP with lymphoid hyperplasia.  s/p right right lower lobe wedge resections VATS and RML 2021 with BOOP .   s/p COVID december 202 s/p MAB.  CT 2021 decreased size and density of multiple largely bronco centric nodules involving all lobes - nw mostly GGO.  currently no other /s s of associated underlying aiCTD\par -- check inflammatory markers \par -- check sub-serologies for associated aiCTD - low suspicion given hx/pe\par -- continue steroids for now per Dr. Malin - will d/w Dr Malin steroid sparing agent\par \par #RP, GERD\par though no other s/s of aiCTD\par -- will do a CTD workup as above though low suspicion \par \par #medication monitoring, long term use of chronic steroids \par d/w patient steroid sparing agents - however with h/o breast cancer. if we introduce a steroid sparing agent for her BOOP, will also actively involve the oncologist.  \par \par \par More than 50% of the encounter was spent counseling the patient on differential, workup, disease course and treatment/management.  Education was provided to the patient during this encounter.  All questions and concerns were addressed and answered.   The patient verbalized understanding and agreed to the plan. \par \par Patient has been instructed to call for an appointment if new symptoms develop.\par Patient has been instructed to make a followup appointment in 1 months.\par \par
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
## 2022-01-28 ENCOUNTER — RX RENEWAL (OUTPATIENT)
Age: 57
End: 2022-01-28

## 2022-01-31 ENCOUNTER — APPOINTMENT (OUTPATIENT)
Dept: MRI IMAGING | Facility: CLINIC | Age: 57
End: 2022-01-31
Payer: COMMERCIAL

## 2022-01-31 ENCOUNTER — OUTPATIENT (OUTPATIENT)
Dept: OUTPATIENT SERVICES | Facility: HOSPITAL | Age: 57
LOS: 1 days | End: 2022-01-31
Payer: COMMERCIAL

## 2022-01-31 DIAGNOSIS — Z00.8 ENCOUNTER FOR OTHER GENERAL EXAMINATION: ICD-10-CM

## 2022-01-31 DIAGNOSIS — Z98.82 BREAST IMPLANT STATUS: Chronic | ICD-10-CM

## 2022-01-31 DIAGNOSIS — Z98.89 OTHER SPECIFIED POSTPROCEDURAL STATES: Chronic | ICD-10-CM

## 2022-01-31 DIAGNOSIS — M71.30 OTHER BURSAL CYST, UNSPECIFIED SITE: Chronic | ICD-10-CM

## 2022-01-31 DIAGNOSIS — Z98.1 ARTHRODESIS STATUS: Chronic | ICD-10-CM

## 2022-01-31 DIAGNOSIS — Z90.10 ACQUIRED ABSENCE OF UNSPECIFIED BREAST AND NIPPLE: Chronic | ICD-10-CM

## 2022-01-31 LAB
EJ AB SER QL: NEGATIVE
ENA JO1 AB SER IA-ACNC: <20 UNITS
ENA PM/SCL AB SER-ACNC: <20 UNITS
ENA SM+RNP AB SER IA-ACNC: <20 UNITS
ENA SS-A IGG SER QL: <20 UNITS
FIBRILLARIN AB SER QL: NEGATIVE
KU AB SER QL: NEGATIVE
MDA-5 (P140)(CADM-140): <20 UNITS
MI2 AB SER QL: NEGATIVE
NXP-2 (P140): <20 UNITS
OJ AB SER QL: NEGATIVE
PL12 AB SER QL: NEGATIVE
PL7 AB SER QL: NEGATIVE
SRP AB SERPL QL: NEGATIVE
TIF GAMMA (P155/140): <20 UNITS
U2 SNRNP AB SER QL: NEGATIVE

## 2022-01-31 PROCEDURE — A9585: CPT

## 2022-01-31 PROCEDURE — 74183 MRI ABD W/O CNTR FLWD CNTR: CPT | Mod: 26

## 2022-01-31 PROCEDURE — 74183 MRI ABD W/O CNTR FLWD CNTR: CPT

## 2022-02-09 ENCOUNTER — APPOINTMENT (OUTPATIENT)
Dept: RHEUMATOLOGY | Facility: CLINIC | Age: 57
End: 2022-02-09
Payer: COMMERCIAL

## 2022-02-09 PROCEDURE — 99214 OFFICE O/P EST MOD 30 MIN: CPT | Mod: 95

## 2022-02-09 NOTE — ASSESSMENT
[FreeTextEntry1] : 55 yo PMHX Breast Ca, Hashimoto's, Asthma, and BOOP here for evaluation \par \par send letter and labs to:\par [facility] \par attn gertrude mathews\par attn opal sanchez\par FAX - [phone]\par \par #BOOP \par s/p multiple biopsies - requires steroid and here for steroid sparing agent perhaps.  s/p right upper lobe VATS 2013 > BOOP and cellular interstitial pneumonia with lymphoid hyperplasia.   s/p left lower lobe wedge VATS 2019 > BOOP with lymphoid hyperplasia.  s/p right right lower lobe wedge resections VATS and RML 2021 with BOOP .   s/p COVID December 202 s/p MAB.  CT 2021 decreased size and density of multiple largely bronco centric nodules involving all lobes - nw mostly GGO.  currently no other /s s of associated underlying aiCTD.  serologic workup was negative, therefore no other aiCTD\par -- now down to prednisone 5 mg daily + dupixent\par -- continue steroids for now per Dr. Malin - will d/w Dr Malin steroid sparing agent - ? MMF or AZA - will have to d/w oncology as well\par -- continue with the dupixent for now and has noticed a bit of difference June 30 2021 - may be that this will be enough\par \par #RP, GERD\par though no other s/s of aiCTD\par -- will do a CTD workup as above though low suspicion \par \par #medication monitoring, long term use of chronic steroids \par d/w patient steroid sparing agents - however with h/o breast cancer. if we introduce a steroid sparing agent for her BOOP, will also actively involve the oncologist.  She is transitioning care to a new oncologist and will d/w him in March\par \par \par More than 50% of the encounter was spent counseling the patient on differential, workup, disease course and treatment/management.  Education was provided to the patient during this encounter.  All questions and concerns were addressed and answered.   The patient verbalized understanding and agreed to the plan. \par \par Patient has been instructed to call for an appointment if new symptoms develop.\par Patient has been instructed to make a followup appointment in 1 months.\par \par

## 2022-02-09 NOTE — HISTORY OF PRESENT ILLNESS
[Home] : at home, [unfilled] , at the time of the visit. [Other Location: e.g. Home (Enter Location, City,State)___] : at [unfilled] [Verbal consent obtained from patient] : the patient, [unfilled] [Cough] : cough [FreeTextEntry1] : INTERVAL HX\par -has been fine \par -needs to discuss steroid sparing options\par -needs to review b/w [Anorexia] : no anorexia [Weight Loss] : no weight loss [Malaise] : no malaise [Fever] : no fever [Chills] : no chills [Fatigue] : no fatigue [Malar Facial Rash] : no malar facial rash [Skin Lesions] : no lesions [Skin Nodules] : no skin nodules [Oral Ulcers] : no oral ulcers [Dry Mouth] : no dry mouth [Dysphagia] : no dysphagia [Shortness of Breath] : no shortness of breath [Chest Pain] : no chest pain [Arthralgias] : no arthralgias [Decreased ROM] : no decreased range of motion [Morning Stiffness] : no morning stiffness [Difficulty Walking] : no difficulty walking [Eye Pain] : no eye pain [Dry Eyes] : no dry eyes

## 2022-02-09 NOTE — CONSULT LETTER
[Dear  ___] : Dear  [unfilled], [Courtesy Letter:] : I had the pleasure of seeing your patient, [unfilled], in my office today. [Please see my note below.] : Please see my note below. [Sincerely,] : Sincerely, [DrVelma  ___] : Dr. TEJEDA [DrVelma ___] : Dr. TEJEDA [___] : [unfilled] [FreeTextEntry2] : Dr. Malin\par Pulmonary

## 2022-02-09 NOTE — REASON FOR VISIT
M-Plasty Complex Repair Preamble Text (Leave Blank If You Do Not Want): Extensive wide undermining was performed. [Follow-Up: _____] : a [unfilled] follow-up visit [Consultation] : a consultation visit [FreeTextEntry1] : Dr. Malin

## 2022-03-01 ENCOUNTER — RX RENEWAL (OUTPATIENT)
Age: 57
End: 2022-03-01

## 2022-03-14 ENCOUNTER — RX RENEWAL (OUTPATIENT)
Age: 57
End: 2022-03-14

## 2022-04-07 ENCOUNTER — APPOINTMENT (OUTPATIENT)
Dept: RHEUMATOLOGY | Facility: CLINIC | Age: 57
End: 2022-04-07

## 2022-04-18 ENCOUNTER — APPOINTMENT (OUTPATIENT)
Dept: PULMONOLOGY | Facility: CLINIC | Age: 57
End: 2022-04-18
Payer: COMMERCIAL

## 2022-04-18 PROCEDURE — 99214 OFFICE O/P EST MOD 30 MIN: CPT | Mod: 95

## 2022-04-18 NOTE — ADDENDUM
[FreeTextEntry1] : Documented by Boom Skinner acting as a scribe for Dr. Munir Malin on 04/18/2022 \par \par All medical record entries made by the Scribe were at my, Dr. Munir Malin's, direction and personally dictated by me on 04/18/2022 . I have reviewed the chart and agree that the record accurately reflects my personal performance of the history, physical exam, assessment and plan. I have also personally directed, reviewed, and agree with the discharge instructions

## 2022-04-18 NOTE — HISTORY OF PRESENT ILLNESS
[Home] : at home, [unfilled] , at the time of the visit. [Medical Office: ([facility])___] : at the medical office located in  [Verbal consent obtained from patient] : the patient, [unfilled] [FreeTextEntry1] : Ms. Kunz is a 56 year old female with a history of abnormal chest CT, severe persistent asthma, BOOP, cough, eosinophilic asthma/PNA, GERD, lung nodules, PND, and SOB and was presents into the office via video call for a follow up. Her chief complaint is \par \par -she notes generally feeling good with Dupixent, down to 3mg of prednisone and cough had decreased\par -she notes she came back to [place] on the 12th from [place] and experienced rhinitis and increased sneezing on the plane \par -she notes initial symptoms of Covid 19 was sore throat, nasal congestion, and mild cough \par -she notes covid positive for 7 days and currently feeling fine \par -she notes only residual symptoms is persistent cough \par -she notes use of nebulizer and anamaria pot\par \par -denies any visual issues, headaches, nausea, vomiting, fever, chills, sweats, chest pain, chest pressure, diarrhea, constipation, dysphagia, dizziness, leg swelling, leg pain, itchy eyes, itchy ears, heartburn, reflux, or sour taste in the mouth.

## 2022-04-18 NOTE — ASSESSMENT
[FreeTextEntry1] : Ms. Kunz is a 56 year old female with a history of chronic sinus disease, GERD, Hashimoto's thyroiditis, BOOP, IgG deficiency, recent iron deficient anemia, asthma (eosinophilic) coming in to the office for pulmonary reevaluation.  She is s/p mastectomy and s/p hospitalization for "expander" Staph infection. She is now recovered and s/p Taxol, Herceptin and Perjetta RT  for her breast cancer. She is now s/p radiation therapy 7/2018, and presents to the office via video call for a pulmonary follow up s/p COVID-19 pneumonitis with residual parenchymal changes 12/2020 - (+)Active BOOP- recontrolled 11/16/ VATS - on prednisone 3 mg QDay / stabl- improved but Covid 19 \par  \par problem 1a: original abnormal chest CT s/p VATS - c/w BOOP - (active) 10/2021 \par -BOOP, diagnosed by VATS in 2013 / 2019; 2021 \par -on Prednisone 20 mg - can drop to 15 mg QDay until rheum visit\par Information sheet given to the patient to be reviewed, this medication is never to be used without consulting the prescribing physician. Proper dietary restraint is necessary specifically salt containing foods, if any reaction may occur should be reported. 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 Risks, benefits were reviewed in detail. The guidelines for abnormalities include follow up CT scans at various intervals which could range from 6 weeks to 1 year intervals. If there is a change for the worse then consideration for a biopsy will be considered if you are a candidate. Second opinion evaluation with a thoracic surgeon or an interventional radiologist could be offered. \par \par Problem 1C: s/p Covid-19 Pneumonitis (12/2020) - resolved- Vaccinated x3\par -s/p a course of Prednisone; 30 mg for 5 days, then 20 mg for 5 days, then 10 mg for 5 days (completed 1/20/2021) \par Information sheet given to the patient to be reviewed, this medication is never to be used without consulting the prescribing physician. Proper dietary restraint is necessary specifically salt containing foods, if any reaction may occur should be reported. \par \par -s/p monoclonal antibody infusions\par \par \par COVID-19 precautionary Immune Support Recommendations:\par -OTC Vitamin C 1000mg BID \par -OTC Quercetin 500mg BID \par -OTC Zinc 50 mg per day \par -OTC Melatonin 5 mg a night \par -OTC Vitamin D 2000mg per day \par -OTC Tonic Water 8oz per day\par -Water 0.5-1 gallon per day\par \par Additional Support Supplements: \par -Liposomal Glutathione 500 mg BID\par -SPM 1 tablet BID \par -Berberine 1000 mg BID  \par \par Problem 1D Covid 19 4/11/2022- continue quarantine until day 10 \par -day 8- No MAB\par -No Paxlovid \par \par problem 2: BOOP - confirmed-"active"\par -continue Prednisone 20 mg a day - reduce to 15 mg QDay\par -diagnosed by right VATs \par -follow up chest CT - 2022 \par -continue Zithromax 500 mg M/W/F (continue)\par - Rheumatologic evaluation - Dr. Otero for additional Steroid sparing medications 1/2022\par \par problem 3b: eosinophilic  asthma\par -off Nucala ; Consider Fasenra or Dupixent. Fasenra on 9/17/2020 \par -Dupixent since 6/2021\par -The safety and efficacy of Nucala was established in three double-blind, randomized, placebo-controlled trials in patients with severe asthma. Compared to a placebo, patients with severe asthma receiving Nucala had fewer exacerbation requiring hospitalization and/or emergency department visits, and a longer time to first exacerbation.  In addition, patients with severe asthma receiving Nucala experienced greater reductions in their daily maintenance oral corticosteroid dose, while maintaining asthma control compared with patients receiving placebo. Treatment with Nucala did not result in a significant improvement in lung function, as measured by the volume of air exhaled by patients in one second. The most common side effects include: headache, injection site reactions, back pain, weakness, and fatigue; hypersensitivity reactions can occur within hours or days including swelling of the face, mouth, and tongue, fainting, dizziness, hives, breathing problems, and rash; herpes zoster infections have occurred. The drug is a monoclonal antibody that inhibits interleukin -5 which helps regular eosinophils, a type of white blood cell that contributes to asthma. The over-production of eosinophils can cause inflammation in the lungs, increasing the frequency of asthma attacks. Patients must also take other medications, including high dose inhaled corticosteroids and at least one additional asthma drug. \par  \par problem 3C: steroid Dependent Asthma\par - Dupixent since 6/2021\par Dupixent is a prescription medicine used with other asthma medicines for the maintenance treatment of moderate-to-severe asthma in people aged 12 years and older whose asthma is not controlled with their current asthma medicines. Dupixent helps prevent severe asthma attacks (exacerbations) and can improve your breathing. Dupixent may also help reduce the amount of oral corticosteroids you need while preventing severe asthma attacks and improving your breathing. Dupixent is not used to treat sudden breathing problems. Risks and side effect of Dupixent were discussed and reviewed with patient.\par \par problem 4: allergic rhinitis\par -recommended Xlear or nasal saline, Navage.\par -recommended Sinugator \par -Environmental measures for allergies were encouraged including mattress and pillow cover, air purifier, and environmental controls. \par \par problem 5: GERD/LPR\par -continue Pepcid 40 mg QHS \par - continue Omeprazole 20 mg or 40 mg before breakfast\par - Recommended Apple cider vineger tablets. \par -Rule of 2s: avoid eating too much, eating too late, eating too spicy, eating two hours before bed\par -Things to avoid including overeating, spicy foods, tight clothing, eating within three hours of bed, this list is not all inclusive. \par -For treatment of reflux, possible options discussed including diet control, H2 blockers, PPIs, as well as coating motility agents discussed as treatment options. Timing of meals and proximity of last meal to sleep were discussed. If symptoms persist, a formal gastrointestinal evaluation is needed. \par \par problem 6: sensory neuropathic cough (controlled)\par -on Neurontin 100 Qs4\par -off Amitriptyline 10 mg up to TID, QHS \par -s/p Baclofen 10 mg pre-meal and QHS\par -continue Tessalon Perles 200 mg qHS\par -Sensory neuropathic cough is an etiology of cough that is often realized once someone has been ruled out for common disease such as: asthma, COPD, eosinophilic bronchitis, bronchiectasis, post nasal drip, and GERD. It sometimes develops following a URI, herpes zoster outbreak in pharynx or thyroid or cervical spine injury. However, many patients have no identifiable antecedent explanation. \par \par problem 7: r/o diaphragm dysfunction\par -fluoroscopy of the diaphragm was all normal\par \par problem 8: low immunity \par -continue to use Gammagard weekly \par -recommended early intervention  \par -s/p Prevnar 13 vaccine / Pneumovax\par \par problem 9: health maintenance\par -s/p Covid 19 vaccine x 3\par -s/p 2021 flu shot\par -s/p 1st Shingrix vaccine, s/p 2nd shot\par -recommended strep pneumonia vaccines: Prevnar-13 vaccine, followed by Pneumo vaccine 23 on year following\par -recommended early intervention for URIs\par -recommended osteoporosis evaluations\par -recommended early dermatological evaluations\par -recommended after the age of 50 to the age of 70, colonoscopy every 5 years\par \par F/U in 3 months with SPI and NiOx\par She is encouraged to call with any changes, concerns, or questions

## 2022-05-03 ENCOUNTER — OUTPATIENT (OUTPATIENT)
Dept: OUTPATIENT SERVICES | Facility: HOSPITAL | Age: 57
LOS: 1 days | End: 2022-05-03
Payer: COMMERCIAL

## 2022-05-03 ENCOUNTER — APPOINTMENT (OUTPATIENT)
Dept: ULTRASOUND IMAGING | Facility: CLINIC | Age: 57
End: 2022-05-03
Payer: COMMERCIAL

## 2022-05-03 DIAGNOSIS — Z90.10 ACQUIRED ABSENCE OF UNSPECIFIED BREAST AND NIPPLE: Chronic | ICD-10-CM

## 2022-05-03 DIAGNOSIS — Z98.89 OTHER SPECIFIED POSTPROCEDURAL STATES: Chronic | ICD-10-CM

## 2022-05-03 DIAGNOSIS — Z98.82 BREAST IMPLANT STATUS: Chronic | ICD-10-CM

## 2022-05-03 DIAGNOSIS — Z98.1 ARTHRODESIS STATUS: Chronic | ICD-10-CM

## 2022-05-03 DIAGNOSIS — M71.30 OTHER BURSAL CYST, UNSPECIFIED SITE: Chronic | ICD-10-CM

## 2022-05-03 DIAGNOSIS — Z00.00 ENCOUNTER FOR GENERAL ADULT MEDICAL EXAMINATION WITHOUT ABNORMAL FINDINGS: ICD-10-CM

## 2022-05-03 PROCEDURE — 76770 US EXAM ABDO BACK WALL COMP: CPT

## 2022-05-03 PROCEDURE — 76770 US EXAM ABDO BACK WALL COMP: CPT | Mod: 26

## 2022-05-18 RX ORDER — IPRATROPIUM BROMIDE 0.5 MG/2.5ML
0.02 SOLUTION RESPIRATORY (INHALATION)
Qty: 120 | Refills: 2 | Status: ACTIVE | COMMUNITY
Start: 2019-02-11 | End: 1900-01-01

## 2022-05-18 RX ORDER — BUDESONIDE 0.5 MG/2ML
0.5 INHALANT ORAL
Qty: 180 | Refills: 0 | Status: ACTIVE | COMMUNITY
Start: 2019-05-10 | End: 1900-01-01

## 2022-06-07 ENCOUNTER — APPOINTMENT (OUTPATIENT)
Dept: PULMONOLOGY | Facility: CLINIC | Age: 57
End: 2022-06-07
Payer: COMMERCIAL

## 2022-06-07 ENCOUNTER — NON-APPOINTMENT (OUTPATIENT)
Age: 57
End: 2022-06-07

## 2022-06-07 VITALS
TEMPERATURE: 97.2 F | BODY MASS INDEX: 30.39 KG/M2 | RESPIRATION RATE: 17 BRPM | DIASTOLIC BLOOD PRESSURE: 80 MMHG | OXYGEN SATURATION: 97 % | HEIGHT: 64 IN | WEIGHT: 178 LBS | SYSTOLIC BLOOD PRESSURE: 120 MMHG | HEART RATE: 75 BPM

## 2022-06-07 PROCEDURE — 99214 OFFICE O/P EST MOD 30 MIN: CPT | Mod: 25

## 2022-06-07 PROCEDURE — 95012 NITRIC OXIDE EXP GAS DETER: CPT

## 2022-06-07 PROCEDURE — 94010 BREATHING CAPACITY TEST: CPT

## 2022-06-07 RX ORDER — PREDNISONE 1 MG/1
1 TABLET ORAL
Qty: 100 | Refills: 2 | Status: ACTIVE | COMMUNITY
Start: 2022-06-07 | End: 1900-01-01

## 2022-06-07 RX ORDER — NITROFURANTOIN (MONOHYDRATE/MACROCRYSTALS) 25; 75 MG/1; MG/1
100 CAPSULE ORAL
Qty: 20 | Refills: 0 | Status: DISCONTINUED | COMMUNITY
Start: 2022-04-30

## 2022-06-07 RX ORDER — LEVOTHYROXINE SODIUM 0.09 MG/1
88 TABLET ORAL
Qty: 90 | Refills: 0 | Status: ACTIVE | COMMUNITY
Start: 2022-05-09

## 2022-06-07 RX ORDER — COVID-19 MOLECULAR TEST ASSAY
KIT MISCELLANEOUS
Qty: 8 | Refills: 0 | Status: DISCONTINUED | COMMUNITY
Start: 2022-01-22

## 2022-06-07 RX ORDER — LEVOTHYROXINE SODIUM 0.09 MG/1
88 TABLET ORAL
Qty: 90 | Refills: 0 | Status: DISCONTINUED | COMMUNITY
Start: 2022-05-09

## 2022-06-07 NOTE — HISTORY OF PRESENT ILLNESS
[FreeTextEntry1] : Ms. Kunz is a 57 year old female with a history of abnormal chest CT, severe persistent asthma, BOOP, cough, eosinophilic asthma/PNA, GERD, lung nodules, PND, and SOB and was presents into the office for a pulmonary  follow up evaluation. Her chief complaint is \par -She has been on Dupixent for 1 year \par -she notes fatigue \par -she notes variable sleep due to stress\par -her sense of smell and taste are regular \par -she is s/p bloodwork with Dr. Concepcion \par -she is now awaiting cytology study and cystoscopy pending cytology results \par s/p covid 19 12/2020, 4/2022 \par \par - patient denies any headaches, nausea, vomiting, fever, chills, sweats, chest pain, chest pressure, palpitations, coughing, wheezing, fatigue, diarrhea, constipation, dysphagia, myalgias, dizziness, leg swelling, leg pain, itchy eyes, itchy ears, heartburn, reflux or sour taste in the mouth

## 2022-06-07 NOTE — ASSESSMENT
[FreeTextEntry1] : Ms. Kunz is a 56 year old female with a history of chronic sinus disease, GERD, Hashimoto's thyroiditis, BOOP, IgG deficiency, recent iron deficient anemia, asthma (eosinophilic) coming in to the office for pulmonary reevaluation.  She is s/p mastectomy and s/p hospitalization for "expander" Staph infection. She is now recovered and s/p Taxol, Herceptin and Perjetta RT  for her breast cancer. She is now s/p radiation therapy 7/2018, and presents to the office for a pulmonary follow up s/p COVID-19 pneumonitis with residual parenchymal changes 12/2020 - (+)Active BOOP- recontrolled 11/16/ VATS - on prednisone 3 mg QDay / covid 19 4/2022 (resolveD) - hematuria work up in progress (Rustam) \par  \par problem 1a: original abnormal chest CT s/p VATS - c/w BOOP - (active) 10/2021 \par -BOOP, diagnosed by VATS in 2013 / 2019; 2021 \par -on Prednisone 20 mg - can drop to 15 mg QDay until rheum visit\par Information sheet given to the patient to be reviewed, this medication is never to be used without consulting the prescribing physician. Proper dietary restraint is necessary specifically salt containing foods, if any reaction may occur should be reported. 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 Risks, benefits were reviewed in detail. The guidelines for abnormalities include follow up CT scans at various intervals which could range from 6 weeks to 1 year intervals. If there is a change for the worse then consideration for a biopsy will be considered if you are a candidate. Second opinion evaluation with a thoracic surgeon or an interventional radiologist could be offered. \par \par Problem 1C: s/p Covid-19 Pneumonitis (12/2020) - resolved- Vaccinated x3; 4/2022\par -s/p a course of Prednisone; 30 mg for 5 days, then 20 mg for 5 days, then 10 mg for 5 days (completed 1/20/2021) \par Information sheet given to the patient to be reviewed, this medication is never to be used without consulting the prescribing physician. Proper dietary restraint is necessary specifically salt containing foods, if any reaction may occur should be reported. \par \par -s/p monoclonal antibody infusions\par \par \par COVID-19 precautionary Immune Support Recommendations:\par -OTC Vitamin C 1000mg BID \par -OTC Quercetin 500mg BID \par -OTC Zinc 50 mg per day \par -OTC Melatonin 5 mg a night \par -OTC Vitamin D 2000mg per day \par -OTC Tonic Water 8oz per day\par -Water 0.5-1 gallon per day\par \par Additional Support Supplements: \par -Liposomal Glutathione 500 mg BID\par -SPM 1 tablet BID \par -Berberine 1000 mg BID  \par \par Problem 1D Covid 19 4/11/2022- continue quarantine until day 10 (resolved) \par -day 8- No MAB\par -No Paxlovid \par \par problem 2: BOOP - confirmed-"active"\par -continue Prednisone 20 mg a day - reduce to 15 mg QDay- now 3mg QD \par -diagnosed by right VATs \par -follow up chest CT - 6/2022\par -continue Zithromax 500 mg M/W/F (continue)\par - Rheumatologic evaluation - Dr. Otero for additional Steroid sparing medications 1/2022\par \par problem 3b: eosinophilic  asthma\par -off Nucala ; Consider Fasenra or Dupixent. Fasenra on 9/17/2020 \par -Dupixent since 6/2021\par -The safety and efficacy of Nucala was established in three double-blind, randomized, placebo-controlled trials in patients with severe asthma. Compared to a placebo, patients with severe asthma receiving Nucala had fewer exacerbation requiring hospitalization and/or emergency department visits, and a longer time to first exacerbation.  In addition, patients with severe asthma receiving Nucala experienced greater reductions in their daily maintenance oral corticosteroid dose, while maintaining asthma control compared with patients receiving placebo. Treatment with Nucala did not result in a significant improvement in lung function, as measured by the volume of air exhaled by patients in one second. The most common side effects include: headache, injection site reactions, back pain, weakness, and fatigue; hypersensitivity reactions can occur within hours or days including swelling of the face, mouth, and tongue, fainting, dizziness, hives, breathing problems, and rash; herpes zoster infections have occurred. The drug is a monoclonal antibody that inhibits interleukin -5 which helps regular eosinophils, a type of white blood cell that contributes to asthma. The over-production of eosinophils can cause inflammation in the lungs, increasing the frequency of asthma attacks. Patients must also take other medications, including high dose inhaled corticosteroids and at least one additional asthma drug. \par  \par problem 3C: steroid Dependent Asthma\par - Dupixent since 6/2021\par Dupixent is a prescription medicine used with other asthma medicines for the maintenance treatment of moderate-to-severe asthma in people aged 12 years and older whose asthma is not controlled with their current asthma medicines. Dupixent helps prevent severe asthma attacks (exacerbations) and can improve your breathing. Dupixent may also help reduce the amount of oral corticosteroids you need while preventing severe asthma attacks and improving your breathing. Dupixent is not used to treat sudden breathing problems. Risks and side effect of Dupixent were discussed and reviewed with patient.\par \par problem 4: allergic rhinitis\par -recommended Xlear or nasal saline, Navage.\par -recommended Sinugator \par -Environmental measures for allergies were encouraged including mattress and pillow cover, air purifier, and environmental controls. \par \par problem 5: GERD/LPR\par -continue Pepcid 40 mg QHS \par - continue Omeprazole 20 mg or 40 mg before breakfast\par - Recommended Apple cider vineger tablets. \par -Rule of 2s: avoid eating too much, eating too late, eating too spicy, eating two hours before bed\par -Things to avoid including overeating, spicy foods, tight clothing, eating within three hours of bed, this list is not all inclusive. \par -For treatment of reflux, possible options discussed including diet control, H2 blockers, PPIs, as well as coating motility agents discussed as treatment options. Timing of meals and proximity of last meal to sleep were discussed. If symptoms persist, a formal gastrointestinal evaluation is needed. \par \par problem 6: sensory neuropathic cough (controlled)\par -on Neurontin 100 QSH\par -off Amitriptyline 10 mg up to TID, QHS \par -s/p Baclofen 10 mg pre-meal and QHS\par -continue Tessalon Perles 200 mg qHS\par -Sensory neuropathic cough is an etiology of cough that is often realized once someone has been ruled out for common disease such as: asthma, COPD, eosinophilic bronchitis, bronchiectasis, post nasal drip, and GERD. It sometimes develops following a URI, herpes zoster outbreak in pharynx or thyroid or cervical spine injury. However, many patients have no identifiable antecedent explanation. \par \par problem 7: r/o diaphragm dysfunction\par -fluoroscopy of the diaphragm was all normal\par \par problem 8: low immunity \par -continue to use Gammagard weekly \par -recommended early intervention  \par -s/p Prevnar 13 vaccine / Pneumovax\par \par problem 9: health maintenance\par -s/p Covid 19 vaccine x 3\par -s/p 2021 flu shot\par -s/p 1st Shingrix vaccine, s/p 2nd shot\par -recommended strep pneumonia vaccines: Prevnar-13 vaccine, followed by Pneumo vaccine 23 on year following\par -recommended early intervention for URIs\par -recommended osteoporosis evaluations\par -recommended early dermatological evaluations\par -recommended after the age of 50 to the age of 70, colonoscopy every 5 years\par \par F/U in 3 months with SPI and NiOx\par She is encouraged to call with any changes, concerns, or questions

## 2022-06-07 NOTE — ADDENDUM
[FreeTextEntry1] : Documented by Mirza Keane acting as a scribe for Dr. Munir Malin on (06/07/2022).\par \par All medical record entries made by the Scribe were at my, Dr. Munir Malin's, direction and personally dictated by me on (06/07/2022). I have reviewed the chart and agree that the record accurately reflects my personal performance of the history, physical exam, assessment and plan. I have also personally directed, reviewed, and agree with the discharge instructions.\par

## 2022-06-07 NOTE — PROCEDURE
[FreeTextEntry1] : PFT revealed mild restrictive dysfunction, with a FEV1 of 1.98L, which is 74% of predicted, with a normal flow volume loop\par \par Feno was 18; a normal value being less than 25. Fractional exhaled nitric oxide (FENO) is regarded as a simple, noninvasive method for assessing eosinophilic airway inflammation. Produced by a variety of cells within the lung, nitric oxide (NO) concentrations are generally low in healthy individuals. However, high concentrations of NO appear to be involved in nonspecific host defense mechanisms and chronic inflammatory  diseases such as asthma. The American Thor[employer] Society (ATS) therefore recommended using FENO to aid in the diagnosis and monitoring of eosinophilic airway inflammation and asthma, and for identifying steroid responsive individuals whose chronic respiratory symptoms may be caused by airway inflammation \par \par \par \par -Images and procedures reviewed in detail and discussed with patient.

## 2022-06-08 NOTE — ASU PATIENT PROFILE, ADULT - NSCAFFEINETYPE_GEN_ALL_CORE_SD
Spoke with patient via phone for follow up anticoagulation visit.  - lab draw.  Last INR on 5/10 was 2.3.  Dose maintained.   Today's INR is 2.7 and is within goal range.    Current warfarin total weekly dose of 37 mg verified.  Informed the INR result is within therapeutic range and instructed to maintain current dose per protocol. Discussed dose and return date of 7/6 for next INR. See Anticoagulation flowsheet.    Aldo is in the office today supervising the treatment.    Instructed to contact the clinic with any unusual bleeding or bruising, any changes in medications, diet, health status, lifestyle, or any other changes, questions or concerns. Verbalized understanding of all discussed.      tea

## 2022-06-14 ENCOUNTER — APPOINTMENT (OUTPATIENT)
Dept: CT IMAGING | Facility: CLINIC | Age: 57
End: 2022-06-14

## 2022-06-22 ENCOUNTER — RX RENEWAL (OUTPATIENT)
Age: 57
End: 2022-06-22

## 2022-07-12 ENCOUNTER — OUTPATIENT (OUTPATIENT)
Dept: OUTPATIENT SERVICES | Facility: HOSPITAL | Age: 57
LOS: 1 days | End: 2022-07-12
Payer: COMMERCIAL

## 2022-07-12 ENCOUNTER — APPOINTMENT (OUTPATIENT)
Dept: CT IMAGING | Facility: CLINIC | Age: 57
End: 2022-07-12

## 2022-07-12 DIAGNOSIS — Z98.89 OTHER SPECIFIED POSTPROCEDURAL STATES: Chronic | ICD-10-CM

## 2022-07-12 DIAGNOSIS — Z98.82 BREAST IMPLANT STATUS: Chronic | ICD-10-CM

## 2022-07-12 DIAGNOSIS — Z90.10 ACQUIRED ABSENCE OF UNSPECIFIED BREAST AND NIPPLE: Chronic | ICD-10-CM

## 2022-07-12 DIAGNOSIS — Z00.8 ENCOUNTER FOR OTHER GENERAL EXAMINATION: ICD-10-CM

## 2022-07-12 DIAGNOSIS — R91.8 OTHER NONSPECIFIC ABNORMAL FINDING OF LUNG FIELD: ICD-10-CM

## 2022-07-12 DIAGNOSIS — Z98.1 ARTHRODESIS STATUS: Chronic | ICD-10-CM

## 2022-07-12 DIAGNOSIS — M71.30 OTHER BURSAL CYST, UNSPECIFIED SITE: Chronic | ICD-10-CM

## 2022-07-12 PROCEDURE — 71250 CT THORAX DX C-: CPT | Mod: 26

## 2022-07-12 PROCEDURE — 71250 CT THORAX DX C-: CPT

## 2022-07-18 ENCOUNTER — APPOINTMENT (OUTPATIENT)
Dept: THORACIC SURGERY | Facility: CLINIC | Age: 57
End: 2022-07-18

## 2022-07-19 ENCOUNTER — APPOINTMENT (OUTPATIENT)
Dept: PULMONOLOGY | Facility: CLINIC | Age: 57
End: 2022-07-19

## 2022-07-19 DIAGNOSIS — Z71.89 OTHER SPECIFIED COUNSELING: ICD-10-CM

## 2022-07-19 PROCEDURE — 99214 OFFICE O/P EST MOD 30 MIN: CPT | Mod: 95

## 2022-07-19 RX ORDER — AZATHIOPRINE 50 1/1
50 TABLET ORAL DAILY
Qty: 90 | Refills: 1 | Status: ACTIVE | COMMUNITY
Start: 2022-07-19 | End: 1900-01-01

## 2022-07-19 NOTE — REASON FOR VISIT
[Follow-Up] : a follow-up visit [FreeTextEntry1] : via video call - s/p COVID-19 infection 12/2020, abnormal chest CT, severe persistent asthma, BOOP, cough, eosinophilic asthma/PNA, GERD, lung nodules, PND, and SOB

## 2022-07-19 NOTE — PROCEDURE
[FreeTextEntry1] : CT (JUL.14.2022) IMPRESSION: \par IMPRESSION:\par Bilateral lung nodules increased in size and number compared to the prior study.\par \par

## 2022-07-19 NOTE — ASSESSMENT
[FreeTextEntry1] : Ms. Kunz is a 56 year old female with a history of chronic sinus disease, GERD, Hashimoto's thyroiditis, BOOP, IgG deficiency, recent iron deficient anemia, asthma (eosinophilic) coming in to the office via video call for pulmonary reevaluation.  She is s/p mastectomy and s/p hospitalization for "expander" Staph infection. She is now recovered and s/p Taxol, Herceptin and Perjetta RT  for her breast cancer. She is now s/p radiation therapy 7/2018, and presents to the office for a pulmonary follow up s/p COVID-19 pneumonitis with residual parenchymal changes 12/2020 - (+)Active BOOP- recontrolled 11/16/ VATS - on prednisone 3 mg QDay / covid 19 4/2022 (resolveD) - now w/ progressive CT c/w worsening BOOP/ - in midst of steroid taper \par  \par problem 1a: original abnormal chest CT s/p VATS - c/w BOOP - (active) 10/2021 \par -BOOP, diagnosed by VATS in 2013 / 2019; 2021 \par -on Prednisone at 1mg/day of prednisone \par - Information sheet given to the patient to be reviewed, this medication is never to be used without consulting the prescribing physician. Proper dietary restraint is necessary specifically salt containing foods, if any reaction may occur should be reported. 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 \par -s/p CT (last: 2/2021,6/2021, 10/2021 )- due now 6/2022\par \par problem 1B: (+) Abnormal CT (nodules) - enlarged RLL- ?EDWAR - steroid sparing regimen \par - Inflammatory disease c/w BOOP - next CT now (6/2022) c/w /BOOP (NC with Zithromax)\par - Restart Zithromax\par - Add Imuran 50mg qday \par - f/p with LFTs \par - leave Cytoxan in reserve \par -s/p Sputum Screening for AFB x 3 \par - get f/p CT 10/2022 \par -  Imuran coupled with low dose prednisone and restart Zithromax macrolide abx \par -CAT scans are the only radiological modality to identify abnormalities w/in the lungs with regards to nodules/masses/lymph nodes. Risks, benefits were reviewed in detail. The guidelines for abnormalities include follow up CT scans at various intervals which could range from 6 weeks to 1 year intervals. If there is a change for the worse then consideration for a biopsy will be considered if you are a candidate. Second opinion evaluation with a thoracic surgeon or an interventional radiologist could be offered. \par \par Problem 1C:  s/p Covid-19 Pneumonitis (12/2020) - resolved- Vaccinated x3; 4/2022\par -s/p a course of Prednisone; 30 mg for 5 days, then 20 mg for 5 days, then 10 mg for 5 days (completed 1/20/2021) \par Information sheet given to the patient to be reviewed, this medication is never to be used without consulting the prescribing physician. Proper dietary restraint is necessary specifically salt containing foods, if any reaction may occur should be reported. \par -s/p monoclonal antibody infusions\par COVID-19 precautionary Immune Support Recommendations:\par -OTC Vitamin C 1000mg BID \par -OTC Quercetin 500mg BID \par -OTC Zinc 50 mg per day \par -OTC Melatonin 5 mg a night \par -OTC Vitamin D 2000mg per day \par -OTC Tonic Water 8oz per day\par -Water 0.5-1 gallon per day\par Additional Support Supplements: \par -Liposomal Glutathione 500 mg BID\par -SPM 1 tablet BID \par -Berberine 1000 mg BID  \par \par Problem 1D: Covid 19 4/11/2022- continue quarantine until day 10 (resolved) \par -day 8- No MAB\par -No Paxlovid \par \par problem 2: BOOP - confirmed-"active"\par -continue Prednisone 20 mg a day - reduce to 15 mg QDay- now 3mg QD \par -diagnosed by right VATs \par -follow up chest CT - 6/2022\par -continue Zithromax 500 mg M/W/F (continue)\par - Rheumatologic evaluation - Dr. Ceballos al for additional Steroid sparing medications 1/2022\par \par problem 3b: eosinophilic  asthma\par -off Nucala ; Consider Fasenra or Dupixent. Fasenra on 9/17/2020 \par -Dupixent since 6/2021\par -The safety and efficacy of Nucala was established in three double-blind, randomized, placebo-controlled trials in patients with severe asthma. Compared to a placebo, patients with severe asthma receiving Nucala had fewer exacerbation requiring hospitalization and/or emergency department visits, and a longer time to first exacerbation.  In addition, patients with severe asthma receiving Nucala experienced greater reductions in their daily maintenance oral corticosteroid dose, while maintaining asthma control compared with patients receiving placebo. Treatment with Nucala did not result in a significant improvement in lung function, as measured by the volume of air exhaled by patients in one second. The most common side effects include: headache, injection site reactions, back pain, weakness, and fatigue; hypersensitivity reactions can occur within hours or days including swelling of the face, mouth, and tongue, fainting, dizziness, hives, breathing problems, and rash; herpes zoster infections have occurred. The drug is a monoclonal antibody that inhibits interleukin -5 which helps regular eosinophils, a type of white blood cell that contributes to asthma. The over-production of eosinophils can cause inflammation in the lungs, increasing the frequency of asthma attacks. Patients must also take other medications, including high dose inhaled corticosteroids and at least one additional asthma drug. \par  \par problem 3C: steroid Dependent Asthma\par - Dupixent since 6/2021\par Dupixent is a prescription medicine used with other asthma medicines for the maintenance treatment of moderate-to-severe asthma in people aged 12 years and older whose asthma is not controlled with their current asthma medicines. Dupixent helps prevent severe asthma attacks (exacerbations) and can improve your breathing. Dupixent may also help reduce the amount of oral corticosteroids you need while preventing severe asthma attacks and improving your breathing. Dupixent is not used to treat sudden breathing problems. Risks and side effect of Dupixent were discussed and reviewed with patient.\par \par problem 4: allergic rhinitis\par -recommended Xlear or nasal saline, Navage.\par -recommended Sinugator \par -Environmental measures for allergies were encouraged including mattress and pillow cover, air purifier, and environmental controls. \par \par problem 5: GERD/LPR\par -continue Pepcid 40 mg QHS \par - continue Omeprazole 20 mg or 40 mg before breakfast\par - Recommended Apple cider vineger tablets. \par -Rule of 2s: avoid eating too much, eating too late, eating too spicy, eating two hours before bed\par -Things to avoid including overeating, spicy foods, tight clothing, eating within three hours of bed, this list is not all inclusive. \par -For treatment of reflux, possible options discussed including diet control, H2 blockers, PPIs, as well as coating motility agents discussed as treatment options. Timing of meals and proximity of last meal to sleep were discussed. If symptoms persist, a formal gastrointestinal evaluation is needed. \par \par problem 6: sensory neuropathic cough (controlled)\par -on Neurontin 100 QSH\par -off Amitriptyline 10 mg up to TID, QHS \par -s/p Baclofen 10 mg pre-meal and QHS\par -continue Tessalon Perles 200 mg qHS\par -Sensory neuropathic cough is an etiology of cough that is often realized once someone has been ruled out for common disease such as: asthma, COPD, eosinophilic bronchitis, bronchiectasis, post nasal drip, and GERD. It sometimes develops following a URI, herpes zoster outbreak in pharynx or thyroid or cervical spine injury. However, many patients have no identifiable antecedent explanation. \par \par problem 7: r/o diaphragm dysfunction\par -fluoroscopy of the diaphragm was all normal\par \par problem 8: low immunity \par -continue to use Gammagard weekly \par -recommended early intervention  \par -s/p Prevnar 13 vaccine / Pneumovax\par \par problem 9: health maintenance\par -s/p Covid 19 vaccine x 3\par -s/p 2021 flu shot\par -s/p 1st Shingrix vaccine, s/p 2nd shot\par -recommended strep pneumonia vaccines: Prevnar-13 vaccine, followed by Pneumo vaccine 23 on year following\par -recommended early intervention for URIs\par -recommended osteoporosis evaluations\par -recommended early dermatological evaluations\par -recommended after the age of 50 to the age of 70, colonoscopy every 5 years\par \par F/U in 3 months with SPI and NiOx\par She is encouraged to call with any changes, concerns, or questions \par \par

## 2022-07-19 NOTE — ADDENDUM
[FreeTextEntry1] : Documented by Sloane Benítez acting as a scribe for Dr. Munir Malin on 07/19/2022 \par \par All medical record entries made by the Scribe were at my, Dr. Munir Malin's, direction and personally dictated by me on 07/19/2022 . I have reviewed the chart and agree that the record accurately reflects my personal performance of the history, physical exam, assessment and plan. I have also personally directed, reviewed, and agree with the discharge instructions.

## 2022-07-19 NOTE — HISTORY OF PRESENT ILLNESS
[Home] : at home, [unfilled] , at the time of the visit. [Medical Office: ([facility])___] : at the medical office located in  [Verbal consent obtained from patient] : the patient, [unfilled] [FreeTextEntry1] : Ms. Kunz is a 57 year old female with a history of abnormal chest CT, severe persistent asthma, BOOP, cough, eosinophilic asthma/PNA, GERD, lung nodules, PND, and SOB and was presents into the office via video call  for a pulmonary  follow up evaluation. Her chief complaint is \par - she has been feeling fine in general \par - she notes she is down to 1mg of prednisone since the last 3 days and she has not been coughing or wheezing \par - no sour taste in the mouth \par - no difficulty swallowing\par - no wheezing\par - no ankle / leg swelling\par - she is wondering if she is still going to continue Dupixent; she has been on it since June 30th 2021 \par - weight has been stable; she walks around her neighborhood and [place]. \par - she notes she gets a medical massage and acupuncture \par - she notes she has not taken her zithromax for some time. She feels she has been fine without it. \par -She denies any visual issues, headaches, nausea, vomiting, fever, chills, sweats, chest pains, chest pressure, diarrhea, constipation, dysphagia, myalgia, dizziness, leg swelling, leg pain, itchy eyes, itchy ears, heartburn, reflux, or sour taste in the mouth.\par

## 2022-07-20 ENCOUNTER — APPOINTMENT (OUTPATIENT)
Dept: THORACIC SURGERY | Facility: CLINIC | Age: 57
End: 2022-07-20

## 2022-07-20 DIAGNOSIS — R91.8 OTHER NONSPECIFIC ABNORMAL FINDING OF LUNG FIELD: ICD-10-CM

## 2022-07-20 PROCEDURE — 99443: CPT

## 2022-07-21 NOTE — HISTORY OF PRESENT ILLNESS
[FreeTextEntry1] : \par 57 year old female. with hx of Endometrial hyperplasia, GERD, Leukopenia and lung nodules, left breast cancer in 2017 s/p b/l mastectomy, chemo, RT. \par \par She is s/p Right VATS, right upper lobe wedge resection x3 on 6/28/2013. 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 \par \par She is s/p Left VATS , Left lower lobe wedge resection on 8/30/2019. Pathology reveals localized organizing pneumonia with lymphoid hyperplasia. \par \par Of note, patient had COVID-19 Pneumonitis in 12/2020 s/p Monoclonal antibody infusions. \par \par Patient is s/p redo Right VATS, lysis of adhesions, RLL wedge resection x 2, RML x 1 on 11/16/2021. 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 \par \par Patient was referred to Dr. Mello (Rheum). \par \par CT chest on 07/12/2022: \par - Bilateral lung nodules, some which are groundglass/part solid, measuring up to 1 cm increase in size and number compared to the prior study. Mild bronchiectasis. Ill-defined bilateral linear opacities.\par \par Patient is here today for a follow up. Patient is doing well. Patient denies shortness of breath, cough, chest pain, fever, chills.\par

## 2022-07-21 NOTE — CONSULT LETTER
[FreeTextEntry3] : Robert Huerta MD\par Attending Surgeon\par Division of Thoracic Surgery\par , [facility] [employer] at [facility]\par

## 2022-07-21 NOTE — REVIEW OF SYSTEMS
Spoke to pt and she would like to go back on Atorvastatin. Please send new med.    [Negative] : Sleep Disorder

## 2022-07-21 NOTE — ASSESSMENT
[FreeTextEntry1] : 57 year old female. with hx of Endometrial hyperplasia, GERD, Leukopenia and lung nodules, left breast cancer in 2017 s/p b/l mastectomy, chemo, RT. \par \par She is s/p Right VATS, right upper lobe wedge resection x3 on 6/28/2013. 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 \par \par She is s/p Left VATS , Left lower lobe wedge resection on 8/30/2019. Pathology reveals localized organizing pneumonia with lymphoid hyperplasia. \par \par Of note, patient had COVID-19 Pneumonitis in 12/2020 s/p Monoclonal antibody infusions. \par \par Patient is s/p redo Right VATS, lysis of adhesions, RLL wedge resection x 2, RML x 1 on 11/16/2021. 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 CT chest reviewed and explained to patient, I recommended patient to f/u with Dr. Malin and RTC as needed. \par \par I personally performed the services described in the documentation, reviewed the documentation recorded by the scribe in my presence and it accurately and completely records my words and actions.\par \par I, Lynne Santillan NP, am scribing for and the presence of MORENO Reilly, the following sections HISTORY OF PRESENT ILLNESS, PAST MEDICAL/FAMILY/SOCIAL HISTORY; REVIEW OF SYSTEMS; VITAL SIGNS; PHYSICAL EXAM; DISPOSITION.

## 2022-07-24 LAB
ALBUMIN SERPL ELPH-MCNC: 4.4 G/DL
ALP BLD-CCNC: 108 U/L
ALT SERPL-CCNC: 12 U/L
AST SERPL-CCNC: 16 U/L
BASOPHILS # BLD AUTO: 0.04 K/UL
BASOPHILS NFR BLD AUTO: 0.8 %
BILIRUB DIRECT SERPL-MCNC: 0.1 MG/DL
BILIRUB INDIRECT SERPL-MCNC: 0.2 MG/DL
BILIRUB SERPL-MCNC: 0.3 MG/DL
EOSINOPHIL # BLD AUTO: 0.07 K/UL
EOSINOPHIL NFR BLD AUTO: 1.4 %
HCT VFR BLD CALC: 44.9 %
HGB BLD-MCNC: 14.1 G/DL
IMM GRANULOCYTES NFR BLD AUTO: 0.4 %
LYMPHOCYTES # BLD AUTO: 1.69 K/UL
LYMPHOCYTES NFR BLD AUTO: 32.7 %
MAN DIFF?: NORMAL
MCHC RBC-ENTMCNC: 28.3 PG
MCHC RBC-ENTMCNC: 31.4 GM/DL
MCV RBC AUTO: 90 FL
MONOCYTES # BLD AUTO: 0.27 K/UL
MONOCYTES NFR BLD AUTO: 5.2 %
NEUTROPHILS # BLD AUTO: 3.08 K/UL
NEUTROPHILS NFR BLD AUTO: 59.5 %
PLATELET # BLD AUTO: 203 K/UL
PROT SERPL-MCNC: 6.6 G/DL
RBC # BLD: 4.99 M/UL
RBC # FLD: 15.3 %
WBC # FLD AUTO: 5.17 K/UL

## 2022-07-25 ENCOUNTER — NON-APPOINTMENT (OUTPATIENT)
Age: 57
End: 2022-07-25

## 2022-07-26 ENCOUNTER — RX RENEWAL (OUTPATIENT)
Age: 57
End: 2022-07-26

## 2022-07-26 RX ORDER — DUPILUMAB 300 MG/2ML
300 INJECTION, SOLUTION SUBCUTANEOUS
Qty: 1 | Refills: 10 | Status: ACTIVE | COMMUNITY
Start: 2021-08-06 | End: 1900-01-01

## 2022-08-18 ENCOUNTER — RX RENEWAL (OUTPATIENT)
Age: 57
End: 2022-08-18

## 2022-08-20 ENCOUNTER — NON-APPOINTMENT (OUTPATIENT)
Age: 57
End: 2022-08-20

## 2022-08-25 ENCOUNTER — APPOINTMENT (OUTPATIENT)
Dept: OBGYN | Facility: CLINIC | Age: 57
End: 2022-08-25

## 2022-08-25 VITALS
OXYGEN SATURATION: 96 % | DIASTOLIC BLOOD PRESSURE: 84 MMHG | WEIGHT: 180 LBS | SYSTOLIC BLOOD PRESSURE: 120 MMHG | HEART RATE: 73 BPM | BODY MASS INDEX: 30.73 KG/M2 | RESPIRATION RATE: 16 BRPM | TEMPERATURE: 97.8 F | HEIGHT: 64 IN

## 2022-08-25 DIAGNOSIS — Z15.02 GENETIC SUSCEPTIBILITY TO MALIGNANT NEOPLASM OF OVARY: ICD-10-CM

## 2022-08-25 PROCEDURE — 99396 PREV VISIT EST AGE 40-64: CPT

## 2022-08-25 NOTE — PHYSICAL EXAM
[Chaperone Present] : A chaperone was present in the examining room during all aspects of the physical examination [FreeTextEntry1] : MERCEDES Cooper [Appropriately responsive] : appropriately responsive [Alert] : alert [No Acute Distress] : no acute distress [No Lymphadenopathy] : no lymphadenopathy [Non-tender] : non-tender [Non-distended] : non-distended [No HSM] : No HSM [No Lesions] : no lesions [No Mass] : no mass [Oriented x3] : oriented x3 [Right Breast Absent] : a total mastectomy [Breast Reconstruction Right] : breast reconstruction [Left Breast Absent] : a total mastectomy [Breast Reconstruction Left] : breast reconstruction [Labia Majora] : normal [Labia Minora] : normal [No Bleeding] : There was no active vaginal bleeding [Soft] : soft [Normal] : normal [Normal Position] : in a normal position [Tenderness] : nontender [Enlarged ___ wks] : not enlarged [Mass ___ cm] : no uterine mass was palpated [Uterine Adnexae] : normal

## 2022-08-25 NOTE — HISTORY OF PRESENT ILLNESS
[N] : Patient is not sexually active [LMPDate] : age 51 [[facility] : 3 [[facility] : 3 [FreeTextEntry1] :   X 3

## 2022-08-28 LAB — HPV HIGH+LOW RISK DNA PNL CVX: NOT DETECTED

## 2022-08-29 ENCOUNTER — NON-APPOINTMENT (OUTPATIENT)
Age: 57
End: 2022-08-29

## 2022-09-02 LAB — CYTOLOGY CVX/VAG DOC THIN PREP: ABNORMAL

## 2022-09-23 ENCOUNTER — APPOINTMENT (OUTPATIENT)
Dept: PULMONOLOGY | Facility: CLINIC | Age: 57
End: 2022-09-23

## 2022-09-23 VITALS
DIASTOLIC BLOOD PRESSURE: 68 MMHG | SYSTOLIC BLOOD PRESSURE: 112 MMHG | TEMPERATURE: 97.4 F | HEART RATE: 72 BPM | OXYGEN SATURATION: 97 % | RESPIRATION RATE: 16 BRPM | BODY MASS INDEX: 29.02 KG/M2 | HEIGHT: 64 IN | WEIGHT: 170 LBS

## 2022-09-23 PROCEDURE — 94618 PULMONARY STRESS TESTING: CPT

## 2022-09-23 PROCEDURE — 94727 GAS DIL/WSHOT DETER LNG VOL: CPT

## 2022-09-23 PROCEDURE — 94010 BREATHING CAPACITY TEST: CPT

## 2022-09-23 PROCEDURE — 99214 OFFICE O/P EST MOD 30 MIN: CPT | Mod: 25

## 2022-09-23 PROCEDURE — 95012 NITRIC OXIDE EXP GAS DETER: CPT

## 2022-09-23 PROCEDURE — 94729 DIFFUSING CAPACITY: CPT

## 2022-09-23 RX ORDER — LEVALBUTEROL HYDROCHLORIDE 0.63 MG/3ML
0.63 SOLUTION RESPIRATORY (INHALATION)
Qty: 360 | Refills: 3 | Status: ACTIVE | COMMUNITY
Start: 2019-06-13 | End: 1900-01-01

## 2022-09-23 NOTE — PROCEDURE
[FreeTextEntry1] : Full PFT- spi reveals mild-moderate obstruction; FEV1 was 1.78L which is 70% of predicted; mild lung volumes; low normal diffusion at 16.4, which is 73% of predicted; normal flow volume loop \par \par FENO was 11; a normal value being less than 25\par Fractional exhaled nitric oxide (FENO) is regarded as a simple, noninvasive method for assessing eosinophilic airway inflammation. Produced by a variety of cells within the lung, nitric oxide (NO) concentrations are generally low in healthy individuals. However, high concentrations of NO appear to be involved in nonspecific host defense mechanisms and chronic inflammatory diseases such as asthma. The American Thoracic Society (ATS) therefore has recommended using FENO to aid in the diagnosis and monitoring of eosinophilic airway inflammation and asthma, and for identifying steroid responsive individuals whose chronic respiratory symptoms may be caused by airway inflammation. \par \par Patient completed a 6-minute walk study in which the Lowest Pulse Ox was  91%; there was very slight  evidence of dyspnea or fatigue. The patient walked 586.8 meters

## 2022-09-23 NOTE — HISTORY OF PRESENT ILLNESS
No pain at this time, up for d/c. Vss. Skin w/d, nad. [FreeTextEntry1] : Ms. Kunz is a 57 year old female with a history of abnormal chest CT, severe persistent asthma, BOOP, cough, eosinophilic asthma/PNA, GERD, lung nodules, PND, and SOB and was presents into the office for a pulmonary  follow up evaluation. Her chief complaint is \par - preop for throat surgery\par - right vocal cord polyp that is growing\par - she notes her voice has been off \par - she notes being on vocal rest \par - she denies difficulty swallowing \par - she denies SOB \par - she notes coughing has been controlled with Rx\par - she notes walking\par - she notes weight is stable \par \par \par - She denies any headaches, nausea, vomiting, fever, chills, sweats, chest pain, chest pressure, palpitations, SOB, coughing, wheezing, fatigue, diarrhea, constipation, dysphagia, myalgias, dizziness, leg swelling, leg pain, itchy eyes, itchy ears, heartburn, reflux, or sour taste in the mouth

## 2022-09-23 NOTE — PHYSICAL EXAM
[No Acute Distress] : no acute distress [No Deformities] : no deformities [Normal Oropharynx] : normal oropharynx [Normal Appearance] : normal appearance [No Neck Mass] : no neck mass [Normal Rate/Rhythm] : normal rate/rhythm [Normal S1, S2] : normal s1, s2 [No Murmurs] : no murmurs [No Resp Distress] : no resp distress [Clear to Auscultation Bilaterally] : clear to auscultation bilaterally [No Abnormalities] : no abnormalities [Benign] : benign [Normal Gait] : normal gait [No Clubbing] : no clubbing [No Cyanosis] : no cyanosis [No Edema] : no edema [FROM] : FROM [Normal Color/ Pigmentation] : normal color/ pigmentation [No Focal Deficits] : no focal deficits [Oriented x3] : oriented x3 [Normal Affect] : normal affect [III] : Mallampati Class: III [TextBox_[id number]] : I:E 1:3, mild inspiratory crackles at the left base

## 2022-09-23 NOTE — ASSESSMENT
[FreeTextEntry1] : Ms. Kunz is a 57 year old female with a history of chronic sinus disease, GERD, Hashimoto's thyroiditis, BOOP, IgG deficiency, recent iron deficient anemia, asthma (eosinophilic) coming in to the office for pulmonary reevaluation.  She is s/p mastectomy and s/p hospitalization for "expander" Staph infection. She is now recovered and s/p Taxol, Herceptin and Perjetta RT  for her breast cancer. She is now s/p radiation therapy 7/2018, and presents to the office for a pulmonary follow up s/p COVID-19 pneumonitis with residual parenchymal changes 12/2020 - (+)Active BOOP- recontrolled 11/16/ VATS - s/p prednisone 3 mg QDay / covid 19 4/2022 (resolveD) - now w/ progressive CT c/w worsening BOOP/ -on prednisone 5mg, awaiting VC polyp surgery 9/28/2022\par \par          ***************************** Pulmonary Pre-Op Clearance for throat surgery ***************************** \par -at this point in time there are no absolute pulmonary contraindications to go forward with the planned procedure \par -at the time of surgery s/he should have optimal pain control, incentive spirometry, early ambulation, DVT and GI prophylaxis. \par  \par problem 1a: original abnormal chest CT s/p VATS - c/w BOOP - (active) 10/2021 \par -BOOP, diagnosed by VATS in 2013 / 2019; 2021 \par -on Prednisone at 1mg/day of prednisone \par - Information sheet given to the patient to be reviewed, this medication is never to be used without consulting the prescribing physician. Proper dietary restraint is necessary specifically salt containing foods, if any reaction may occur should be reported. 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 \par -s/p CT (last: 2/2021,6/2021, 10/2021 )- 7/2022- next 10/2022\par \par problem 1B: (+) Abnormal CT (nodules) - enlarged RLL- ?EDWAR - steroid sparing regimen \par - Inflammatory disease c/w BOOP - next CT now (6/2022) c/w /BOOP (NC with Zithromax)\par - Add Zithromax 500 mg (M,W,F) 10.2022, pred 5/mg\par - Add Imuran 50mg qday \par - f/p with LFTs \par - leave Cytoxan in reserve \par -s/p Sputum Screening for AFB x 3 \par - get f/p CT 10/2022 \par -  Imuran coupled with low dose prednisone and restart Zithromax macrolide abx 7/2022 (Imuran on hold) \par -CAT scans are the only radiological modality to identify abnormalities w/in the lungs with regards to nodules/masses/lymph nodes. Risks, benefits were reviewed in detail. The guidelines for abnormalities include follow up CT scans at various intervals which could range from 6 weeks to 1 year intervals. If there is a change for the worse then consideration for a biopsy will be considered if you are a candidate. Second opinion evaluation with a thoracic surgeon or an interventional radiologist could be offered. \par \par Problem 1C:  s/p Covid-19 Pneumonitis (12/2020) - resolved- Vaccinated x3; 4/2022\par -s/p a course of Prednisone; 30 mg for 5 days, then 20 mg for 5 days, then 10 mg for 5 days (completed 1/20/2021) \par Information sheet given to the patient to be reviewed, this medication is never to be used without consulting the prescribing physician. Proper dietary restraint is necessary specifically salt containing foods, if any reaction may occur should be reported. \par -s/p monoclonal antibody infusions\par COVID-19 precautionary Immune Support Recommendations:\par -OTC Vitamin C 1000mg BID \par -OTC Quercetin 500mg BID \par -OTC Zinc 50 mg per day \par -OTC Melatonin 5 mg a night \par -OTC Vitamin D 2000mg per day \par -OTC Tonic Water 8oz per day\par -Water 0.5-1 gallon per day\par Additional Support Supplements: \par -Liposomal Glutathione 500 mg BID\par -SPM 1 tablet BID \par -Berberine 1000 mg BID  \par \par Problem 1D: Covid 19 4/11/2022- continue quarantine until day 10 (resolved) \par -day 8- No MAB\par -No Paxlovid \par \par problem 2: BOOP - confirmed-"active"\par -continue Prednisone 20 mg a day - reduce to 15 mg QDay- now 5mg QD \par -diagnosed by right VATs \par -s/p up chest CT - 7/2022- next 10/2022\par -continue Zithromax 500 mg M/W/F (continue)\par - Rheumatologic evaluation - Dr. Ceballos al for additional Steroid sparing medications 1/2022\par \par problem 3b: eosinophilic  asthma\par -off Nucala ; Consider Fasenra or Dupixent. Fasenra on 9/17/2020 \par -Dupixent since 6/2021\par -The safety and efficacy of Nucala was established in three double-blind, randomized, placebo-controlled trials in patients with severe asthma. Compared to a placebo, patients with severe asthma receiving Nucala had fewer exacerbation requiring hospitalization and/or emergency department visits, and a longer time to first exacerbation.  In addition, patients with severe asthma receiving Nucala experienced greater reductions in their daily maintenance oral corticosteroid dose, while maintaining asthma control compared with patients receiving placebo. Treatment with Nucala did not result in a significant improvement in lung function, as measured by the volume of air exhaled by patients in one second. The most common side effects include: headache, injection site reactions, back pain, weakness, and fatigue; hypersensitivity reactions can occur within hours or days including swelling of the face, mouth, and tongue, fainting, dizziness, hives, breathing problems, and rash; herpes zoster infections have occurred. The drug is a monoclonal antibody that inhibits interleukin -5 which helps regular eosinophils, a type of white blood cell that contributes to asthma. The over-production of eosinophils can cause inflammation in the lungs, increasing the frequency of asthma attacks. Patients must also take other medications, including high dose inhaled corticosteroids and at least one additional asthma drug. \par  \par problem 3C: steroid Dependent Asthma\par - Dupixent since 6/2021\par Dupixent is a prescription medicine used with other asthma medicines for the maintenance treatment of moderate-to-severe asthma in people aged 12 years and older whose asthma is not controlled with their current asthma medicines. Dupixent helps prevent severe asthma attacks (exacerbations) and can improve your breathing. Dupixent may also help reduce the amount of oral corticosteroids you need while preventing severe asthma attacks and improving your breathing. Dupixent is not used to treat sudden breathing problems. Risks and side effect of Dupixent were discussed and reviewed with patient.\par \par problem 4: allergic rhinitis\par -recommended Xlear or nasal saline, Navage.\par -recommended Sinugator \par -Environmental measures for allergies were encouraged including mattress and pillow cover, air purifier, and environmental controls. \par \par problem 5: GERD/LPR\par -continue Pepcid 40 mg QHS \par - continue Omeprazole 20 mg or 40 mg before breakfast\par - Recommended Apple cider vineger tablets. \par -Rule of 2s: avoid eating too much, eating too late, eating too spicy, eating two hours before bed\par -Things to avoid including overeating, spicy foods, tight clothing, eating within three hours of bed, this list is not all inclusive. \par -For treatment of reflux, possible options discussed including diet control, H2 blockers, PPIs, as well as coating motility agents discussed as treatment options. Timing of meals and proximity of last meal to sleep were discussed. If symptoms persist, a formal gastrointestinal evaluation is needed. \par \par Problem 5A: Pulmonary Pre-Op Clearance for vocal cord surgery \par -at this point in time there are no absolute pulmonary contraindications to go forward with the planned procedure \par -at the time of surgery s/he should have optimal pain control, incentive spirometry, early ambulation, DVT and GI prophylaxis. \par \par problem 6: sensory neuropathic cough (controlled)\par -on Neurontin 100 QSH\par -off Amitriptyline 10 mg up to TID, QHS \par -s/p Baclofen 10 mg pre-meal and QHS\par -continue Tessalon Perles 200 mg qHS\par -Sensory neuropathic cough is an etiology of cough that is often realized once someone has been ruled out for common disease such as: asthma, COPD, eosinophilic bronchitis, bronchiectasis, post nasal drip, and GERD. It sometimes develops following a URI, herpes zoster outbreak in pharynx or thyroid or cervical spine injury. However, many patients have no identifiable antecedent explanation. \par \par problem 7: r/o diaphragm dysfunction\par -fluoroscopy of the diaphragm was all normal\par \par problem 8: low immunity \par -continue to use Gammagard weekly \par -recommended early intervention  \par -s/p Prevnar 13 vaccine / Pneumovax\par \par problem 9: health maintenance\par -s/p Covid 19 vaccine x 3\par -s/p 2021 flu shot\par -s/p 1st Shingrix vaccine, s/p 2nd shot\par -recommended strep pneumonia vaccines: Prevnar-13 vaccine, followed by Pneumo vaccine 23 on year following\par -recommended early intervention for URIs\par -recommended osteoporosis evaluations\par -recommended early dermatological evaluations\par -recommended after the age of 50 to the age of 70, colonoscopy every 5 years\par \par F/U in 3 months with SPI and NiOx\par She is encouraged to call with any changes, concerns, or questions \par \par

## 2022-09-23 NOTE — ADDENDUM
[FreeTextEntry1] : Documented by Isaiha Landers acting as a scribe for Dr. Munir Malin on (09/23/2022).\par \par All medical record entries made by the Scribe were at my, Dr. Munir Malin's, direction and personally dictated by me on (09/23/2022). I have reviewed the chart and agree that the record accurately reflects my personal performance of the history, physical exam, assessment and plan. I have personally directed, reviewed, and agree with the discharge instructions.

## 2022-09-28 ENCOUNTER — RESULT REVIEW (OUTPATIENT)
Age: 57
End: 2022-09-28

## 2022-09-30 ENCOUNTER — TRANSCRIPTION ENCOUNTER (OUTPATIENT)
Age: 57
End: 2022-09-30

## 2022-10-13 ENCOUNTER — APPOINTMENT (OUTPATIENT)
Dept: PULMONOLOGY | Facility: CLINIC | Age: 57
End: 2022-10-13

## 2022-10-24 ENCOUNTER — RX RENEWAL (OUTPATIENT)
Age: 57
End: 2022-10-24

## 2022-10-29 ENCOUNTER — OUTPATIENT (OUTPATIENT)
Dept: OUTPATIENT SERVICES | Facility: HOSPITAL | Age: 57
LOS: 1 days | End: 2022-10-29
Payer: COMMERCIAL

## 2022-10-29 ENCOUNTER — APPOINTMENT (OUTPATIENT)
Dept: ULTRASOUND IMAGING | Facility: CLINIC | Age: 57
End: 2022-10-29

## 2022-10-29 ENCOUNTER — APPOINTMENT (OUTPATIENT)
Dept: CT IMAGING | Facility: CLINIC | Age: 57
End: 2022-10-29

## 2022-10-29 DIAGNOSIS — Z98.1 ARTHRODESIS STATUS: Chronic | ICD-10-CM

## 2022-10-29 DIAGNOSIS — Z98.89 OTHER SPECIFIED POSTPROCEDURAL STATES: Chronic | ICD-10-CM

## 2022-10-29 DIAGNOSIS — Z98.82 BREAST IMPLANT STATUS: Chronic | ICD-10-CM

## 2022-10-29 DIAGNOSIS — Z90.10 ACQUIRED ABSENCE OF UNSPECIFIED BREAST AND NIPPLE: Chronic | ICD-10-CM

## 2022-10-29 DIAGNOSIS — R93.89 ABNORMAL FINDINGS ON DIAGNOSTIC IMAGING OF OTHER SPECIFIED BODY STRUCTURES: ICD-10-CM

## 2022-10-29 DIAGNOSIS — Z00.8 ENCOUNTER FOR OTHER GENERAL EXAMINATION: ICD-10-CM

## 2022-10-29 DIAGNOSIS — M71.30 OTHER BURSAL CYST, UNSPECIFIED SITE: Chronic | ICD-10-CM

## 2022-10-29 PROCEDURE — 76830 TRANSVAGINAL US NON-OB: CPT | Mod: 26

## 2022-10-29 PROCEDURE — 76830 TRANSVAGINAL US NON-OB: CPT

## 2022-10-29 PROCEDURE — 71250 CT THORAX DX C-: CPT | Mod: 26

## 2022-10-29 PROCEDURE — 71250 CT THORAX DX C-: CPT

## 2022-10-29 PROCEDURE — 76856 US EXAM PELVIC COMPLETE: CPT | Mod: 26

## 2022-10-29 PROCEDURE — 76856 US EXAM PELVIC COMPLETE: CPT

## 2022-11-04 ENCOUNTER — NON-APPOINTMENT (OUTPATIENT)
Age: 57
End: 2022-11-04

## 2022-11-07 ENCOUNTER — NON-APPOINTMENT (OUTPATIENT)
Age: 57
End: 2022-11-07

## 2022-12-19 ENCOUNTER — APPOINTMENT (OUTPATIENT)
Dept: PULMONOLOGY | Facility: CLINIC | Age: 57
End: 2022-12-19

## 2022-12-19 PROCEDURE — 99214 OFFICE O/P EST MOD 30 MIN: CPT | Mod: 95

## 2022-12-19 NOTE — REASON FOR VISIT
[Follow-Up] : a follow-up visit [FreeTextEntry1] : video call- s/p COVID-19 infection 12/2020, abnormal chest CT, severe persistent asthma, BOOP, cough, eosinophilic asthma/PNA, GERD, lung nodules, PND, and SOB

## 2022-12-19 NOTE — ADDENDUM
[FreeTextEntry1] : Documented by Bev Todd acting as a scribe for Dr. Munir Malin on 12/19/2022 .\par \par All medical record entries made by the Scribe were at my, Dr. Munir Malin's, direction and personally dictated by me on. I have reviewed the chart and agree that the record accurately reflects my personal performance of the history, physical exam, assessment and plan. I have also personally directed, reviewed, and agree with the discharge instructions.

## 2022-12-19 NOTE — ASSESSMENT
[FreeTextEntry1] : Ms. Kunz is a 57 year old female with a history of chronic sinus disease, GERD, Hashimoto's thyroiditis, BOOP, IgG deficiency, recent iron deficient anemia, asthma (eosinophilic) coming in to the office for pulmonary reevaluation.  She is s/p mastectomy and s/p hospitalization for "expander" Staph infection. She is now recovered and s/p Taxol, Herceptin and Perjetta RT  for her breast cancer. 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 \par - Information sheet given to the patient to be reviewed, this medication is never to be used without consulting the prescribing physician. Proper dietary restraint is necessary specifically salt containing foods, if any reaction may occur should be reported. 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 \par -s/p CT (last: 2/2021,6/2021, 10/2021 )- 7/2022,10/2022 -next 1/2023\par \par problem 1B: (+) Abnormal CT (nodules) - enlarged RLL- ?EDWAR - steroid sparing regimen \par - Inflammatory disease c/w BOOP  c/w /BOOP \par - Add Zithromax 500 mg (M,W,F) 10.2022, pred 5/mg\par - refused Imuran 50mg qday \par -2nd opinion jose cruz \par - f/p with LFTs \par - leave Cytoxan in reserve \par -s/p Sputum Screening for AFB x 3 \par - get f/p CT 1/2023- next \par -  Imuran coupled with low dose prednisone and restarted Zithromax macrolide abx 7/2022 (Imuran on hold) \par -CAT scans are the only radiological modality to identify abnormalities w/in the lungs with regards to nodules/masses/lymph nodes. Risks, benefits were reviewed in detail. The guidelines for abnormalities include follow up CT scans at various intervals which could range from 6 weeks to 1 year intervals. If there is a change for the worse then consideration for a biopsy will be considered if you are a candidate. Second opinion evaluation with a thoracic surgeon or an interventional radiologist could be offered. \par \par Problem 1C:  s/p Covid-19 Pneumonitis (12/2020) - resolved- Vaccinated x3; 4/2022\par -s/p a course of Prednisone; 30 mg for 5 days, then 20 mg for 5 days, then 10 mg for 5 days (completed 1/20/2021) \par Information sheet given to the patient to be reviewed, this medication is never to be used without consulting the prescribing physician. Proper dietary restraint is necessary specifically salt containing foods, if any reaction may occur should be reported. \par -s/p monoclonal antibody infusions\par COVID-19 precautionary Immune Support Recommendations:\par -OTC Vitamin C 1000mg BID \par -OTC Quercetin 500mg BID \par -OTC Zinc 50 mg per day \par -OTC Melatonin 5 mg a night \par -OTC Vitamin D 2000mg per day \par -OTC Tonic Water 8oz per day\par -Water 0.5-1 gallon per day\par Additional Support Supplements: \par -Liposomal Glutathione 500 mg BID\par -SPM 1 tablet BID \par -Berberine 1000 mg BID  \par \par Problem 1D: Covid 19 4/11/2022- continue quarantine until day 10 (resolved) \par -day 8- No MAB\par -No Paxlovid \par \par problem 2: BOOP - confirmed-"active"\par -continue Prednisone 20 mg a day - reduce to 15 mg QDay- now 7mg QD \par -diagnosed by right VATs \par -s/p up chest CT  10/2022. next 1/2023\par -continue Zithromax 500 mg M/W/F (continue)\par - Rheumatologic evaluation - Dr. Ceballos al for additional Steroid sparing medications 1/2022\par \par problem 3b: eosinophilic  asthma\par -off Nucala ; Consider Fasenra or Dupixent. Fasenra on 9/17/2020 \par -Dupixent since 6/2021\par -The safety and efficacy of Nucala was established in three double-blind, randomized, placebo-controlled trials in patients with severe asthma. Compared to a placebo, patients with severe asthma receiving Nucala had fewer exacerbation requiring hospitalization and/or emergency department visits, and a longer time to first exacerbation.  In addition, patients with severe asthma receiving Nucala experienced greater reductions in their daily maintenance oral corticosteroid dose, while maintaining asthma control compared with patients receiving placebo. Treatment with Nucala did not result in a significant improvement in lung function, as measured by the volume of air exhaled by patients in one second. The most common side effects include: headache, injection site reactions, back pain, weakness, and fatigue; hypersensitivity reactions can occur within hours or days including swelling of the face, mouth, and tongue, fainting, dizziness, hives, breathing problems, and rash; herpes zoster infections have occurred. The drug is a monoclonal antibody that inhibits interleukin -5 which helps regular eosinophils, a type of white blood cell that contributes to asthma. The over-production of eosinophils can cause inflammation in the lungs, increasing the frequency of asthma attacks. Patients must also take other medications, including high dose inhaled corticosteroids and at least one additional asthma drug. \par  \par problem 3C: steroid Dependent Asthma\par - Dupixent since 6/2021\par Dupixent is a prescription medicine used with other asthma medicines for the maintenance treatment of moderate-to-severe asthma in people aged 12 years and older whose asthma is not controlled with their current asthma medicines. Dupixent helps prevent severe asthma attacks (exacerbations) and can improve your breathing. Dupixent may also help reduce the amount of oral corticosteroids you need while preventing severe asthma attacks and improving your breathing. Dupixent is not used to treat sudden breathing problems. Risks and side effect of Dupixent were discussed and reviewed with patient.\par \par problem 4: allergic rhinitis\par -recommended Xlear or nasal saline, Navage.\par -recommended Sinugator \par -Environmental measures for allergies were encouraged including mattress and pillow cover, air purifier, and environmental controls. \par \par problem 5: GERD/LPR\par -continue Pepcid 40 mg QHS \par - continue Omeprazole 20 mg or 40 mg before breakfast\par - Recommended Apple cider vineger tablets. \par -Rule of 2s: avoid eating too much, eating too late, eating too spicy, eating two hours before bed\par -Things to avoid including overeating, spicy foods, tight clothing, eating within three hours of bed, this list is not all inclusive. \par -For treatment of reflux, possible options discussed including diet control, H2 blockers, PPIs, as well as coating motility agents discussed as treatment options. Timing of meals and proximity of last meal to sleep were discussed. If symptoms persist, a formal gastrointestinal evaluation is needed. \par \par Problem 5A: Pulmonary Pre-Op Clearance for vocal cord surgery \par -at this point in time there are no absolute pulmonary contraindications to go forward with the planned procedure \par -at the time of surgery s/he should have optimal pain control, incentive spirometry, early ambulation, DVT and GI prophylaxis. \par \par problem 6: sensory neuropathic cough (controlled)\par -on Neurontin 100 QSH\par -off Amitriptyline 10 mg up to TID, QHS \par -s/p Baclofen 10 mg pre-meal and QHS\par -continue Tessalon Perles 200 mg qHS\par -Sensory neuropathic cough is an etiology of cough that is often realized once someone has been ruled out for common disease such as: asthma, COPD, eosinophilic bronchitis, bronchiectasis, post nasal drip, and GERD. It sometimes develops following a URI, herpes zoster outbreak in pharynx or thyroid or cervical spine injury. However, many patients have no identifiable antecedent explanation. \par \par problem 7: r/o diaphragm dysfunction\par -fluoroscopy of the diaphragm was all normal\par \par problem 8: low immunity \par -continue to use Gammagard weekly \par -recommended early intervention  \par -s/p Prevnar 13 vaccine / Pneumovax\par \par problem 9: health maintenance\par -s/p Covid 19 vaccine x 3\par -s/p 2022 flu shot\par -s/p Shingrix vaccine\par -recommended strep pneumonia vaccines: Prevnar-13 vaccine, followed by Pneumo vaccine 23 on year following\par -recommended early intervention for URIs\par -recommended osteoporosis evaluations\par -recommended early dermatological evaluations\par -recommended after the age of 50 to the age of 70, colonoscopy every 5 years\par \par F/U in 3 months with SPI and NiOx\par She is encouraged to call with any changes, concerns, or questions \par \par

## 2022-12-19 NOTE — PHYSICAL EXAM
[No Acute Distress] : no acute distress [No Deformities] : no deformities [Normal Oropharynx] : normal oropharynx [III] : Mallampati Class: III [Normal Appearance] : normal appearance [No Neck Mass] : no neck mass [Normal Rate/Rhythm] : normal rate/rhythm [Normal S1, S2] : normal s1, s2 [No Murmurs] : no murmurs [No Resp Distress] : no resp distress [Clear to Auscultation Bilaterally] : clear to auscultation bilaterally [No Abnormalities] : no abnormalities [Benign] : benign [Normal Gait] : normal gait [No Clubbing] : no clubbing [No Cyanosis] : no cyanosis [No Edema] : no edema [FROM] : FROM [Normal Color/ Pigmentation] : normal color/ pigmentation [No Focal Deficits] : no focal deficits [Oriented x3] : oriented x3 [Normal Affect] : normal affect [TextBox_68] : I:E 1:3, mild inspiratory crackles at the left base

## 2022-12-19 NOTE — HISTORY OF PRESENT ILLNESS
[Home] : at home, [unfilled] , at the time of the visit. [Medical Office: ([facility])___] : at the medical office located in  [Verbal consent obtained from patient] : the patient, [unfilled] [FreeTextEntry1] : Ms. Kunz is a 57 year old female with a history of abnormal chest CT, severe persistent asthma, BOOP, cough, eosinophilic asthma/PNA, GERD, lung nodules, PND, and SOB and was presents into the office via video call for a pulmonary  follow up evaluation. 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

## 2022-12-24 ENCOUNTER — APPOINTMENT (OUTPATIENT)
Dept: ULTRASOUND IMAGING | Facility: CLINIC | Age: 57
End: 2022-12-24
Payer: COMMERCIAL

## 2022-12-24 ENCOUNTER — OUTPATIENT (OUTPATIENT)
Dept: OUTPATIENT SERVICES | Facility: HOSPITAL | Age: 57
LOS: 1 days | End: 2022-12-24
Payer: COMMERCIAL

## 2022-12-24 DIAGNOSIS — Z00.8 ENCOUNTER FOR OTHER GENERAL EXAMINATION: ICD-10-CM

## 2022-12-24 DIAGNOSIS — Z98.89 OTHER SPECIFIED POSTPROCEDURAL STATES: Chronic | ICD-10-CM

## 2022-12-24 DIAGNOSIS — Z98.82 BREAST IMPLANT STATUS: Chronic | ICD-10-CM

## 2022-12-24 DIAGNOSIS — M71.30 OTHER BURSAL CYST, UNSPECIFIED SITE: Chronic | ICD-10-CM

## 2022-12-24 DIAGNOSIS — Z90.10 ACQUIRED ABSENCE OF UNSPECIFIED BREAST AND NIPPLE: Chronic | ICD-10-CM

## 2022-12-24 DIAGNOSIS — Z98.1 ARTHRODESIS STATUS: Chronic | ICD-10-CM

## 2022-12-24 PROCEDURE — 76536 US EXAM OF HEAD AND NECK: CPT | Mod: 26

## 2022-12-24 PROCEDURE — 76536 US EXAM OF HEAD AND NECK: CPT

## 2022-12-26 ENCOUNTER — APPOINTMENT (OUTPATIENT)
Dept: CT IMAGING | Facility: CLINIC | Age: 57
End: 2022-12-26
Payer: COMMERCIAL

## 2022-12-26 ENCOUNTER — OUTPATIENT (OUTPATIENT)
Dept: OUTPATIENT SERVICES | Facility: HOSPITAL | Age: 57
LOS: 1 days | End: 2022-12-26
Payer: COMMERCIAL

## 2022-12-26 DIAGNOSIS — Z98.82 BREAST IMPLANT STATUS: Chronic | ICD-10-CM

## 2022-12-26 DIAGNOSIS — Z98.1 ARTHRODESIS STATUS: Chronic | ICD-10-CM

## 2022-12-26 DIAGNOSIS — Z00.8 ENCOUNTER FOR OTHER GENERAL EXAMINATION: ICD-10-CM

## 2022-12-26 DIAGNOSIS — Z98.89 OTHER SPECIFIED POSTPROCEDURAL STATES: Chronic | ICD-10-CM

## 2022-12-26 DIAGNOSIS — Z90.10 ACQUIRED ABSENCE OF UNSPECIFIED BREAST AND NIPPLE: Chronic | ICD-10-CM

## 2022-12-26 DIAGNOSIS — M71.30 OTHER BURSAL CYST, UNSPECIFIED SITE: Chronic | ICD-10-CM

## 2022-12-26 PROCEDURE — 71250 CT THORAX DX C-: CPT

## 2022-12-26 PROCEDURE — 71250 CT THORAX DX C-: CPT | Mod: 26

## 2022-12-27 NOTE — H&P PST ADULT - CORNEAL ABRASION RISK
Patient discharged to home in stable condition  All discharge instructions explained to patient prior to leaving the medical/surgical floor with patient verbally stating understanding of same  Patient then transported to the hospital exit via wheelchair and was transported there by the RN 
Pt in chair
Pt refused a bath pt did rinse mouth pt refused to sit in recliner RN aware
denies

## 2022-12-30 ENCOUNTER — APPOINTMENT (OUTPATIENT)
Dept: CT IMAGING | Facility: CLINIC | Age: 57
End: 2022-12-30

## 2023-01-20 NOTE — ASU PREOP CHECKLIST - NOTHING BY MOUTH SINCE
Recovery, then floor if stable 29-Aug-2019 22:00 Aklief counseling:  Patient advised to apply a pea-sized amount only at bedtime and wait 30 minutes after washing their face before applying.  If too drying, patient may add a non-comedogenic moisturizer.  The most commonly reported side effects including irritation, redness, scaling, dryness, stinging, burning, itching, and increased risk of sunburn.  The patient verbalized understanding of the proper use and possible adverse effects of retinoids.  All of the patient's questions and concerns were addressed.

## 2023-01-24 ENCOUNTER — APPOINTMENT (OUTPATIENT)
Dept: PULMONOLOGY | Facility: CLINIC | Age: 58
End: 2023-01-24
Payer: COMMERCIAL

## 2023-01-24 VITALS
RESPIRATION RATE: 16 BRPM | OXYGEN SATURATION: 98 % | DIASTOLIC BLOOD PRESSURE: 70 MMHG | WEIGHT: 156 LBS | HEIGHT: 64 IN | SYSTOLIC BLOOD PRESSURE: 110 MMHG | HEART RATE: 100 BPM | BODY MASS INDEX: 26.63 KG/M2

## 2023-01-24 DIAGNOSIS — Z01.811 ENCOUNTER FOR PREPROCEDURAL RESPIRATORY EXAMINATION: ICD-10-CM

## 2023-01-24 PROCEDURE — 94727 GAS DIL/WSHOT DETER LNG VOL: CPT

## 2023-01-24 PROCEDURE — 99214 OFFICE O/P EST MOD 30 MIN: CPT | Mod: 25

## 2023-01-24 PROCEDURE — 94729 DIFFUSING CAPACITY: CPT

## 2023-01-24 PROCEDURE — 94010 BREATHING CAPACITY TEST: CPT

## 2023-01-24 PROCEDURE — 95012 NITRIC OXIDE EXP GAS DETER: CPT

## 2023-01-24 PROCEDURE — ZZZZZ: CPT

## 2023-01-25 PROBLEM — Z01.811 PREOP PULMONARY/RESPIRATORY EXAM: Status: ACTIVE | Noted: 2022-09-23

## 2023-01-25 NOTE — PROCEDURE
[FreeTextEntry1] : PFT'S performed in office show mild obstructive lung defect.  Lung volumes and diffusion capacity are within normal limits.\par FEV: 85\par FEV1/FVC Ratio: 73\par EPY83-13%: 63\par DLCO: 128\par \par FENO: 15 ppb

## 2023-01-25 NOTE — HISTORY OF PRESENT ILLNESS
[TextBox_4] : Ms. Kunz is a 57 year old female with a history of abnormal chest CT, severe persistent asthma, BOOP, cough, eosinophilic asthma/PNA, GERD, lung nodules, PND, and SOB who presents to the office for preop pulmonary clearance.\par \par Her chief complaint is preop clearance.\par \par Patient states she is scheduled for right vocal cord surgery with Dr. Melissa at [facility] January 31, 2023.  She states she is here for preop pulmonary clearance.\par Patient states she is compliant with the Bevespi and Arunity inhalers and uses albuterol rescue inhaler as needed.  She states since she started on Dupixent biologic injection her asthma has been well controlled.\par \par Patient denies fever, chills, shortness of breath on exertion or rest, cough, wheezing, nasal congestion, runny nose, heartburn/reflux.\par \par Surgeon: Charles Melissa fax #[phone]

## 2023-01-25 NOTE — ASSESSMENT
[FreeTextEntry1] : Ms. Kunz is a 57 year old female with a history of abnormal chest CT, severe persistent asthma, BOOP, cough, eosinophilic asthma/PNA, GERD, lung nodules, PND, and SOB who presents to the office for preop pulmonary clearance.\par \par Her chief complaint is preop clearance.\par \par 1.  Preop clearance\par -Patient is asymptomatic for new or worsening pulmonary compliant. VS & PFT are within normal limits.\par -Patient can move forward with planned procedure at this time.\par \par 2.  Asthma\par -Continue albuterol inhaler 2 puffs every 6 hours as needed\par -Continue Arunity1 puff daily: Rinse and gargle after use\par -Continue Bevespi 2 puffs twice a day\par -Continue Dupixent injections\par \par 3.  Abnormal CT/BOOP\par -Continue Zithromax 500 mg (M,W,F)\par -Continue prednisone 7 mg\par \par 4.  GERD\par -Continue Pepcid 40 mg at bedtime\par -Continue omeprazole 40 mg before breakfast\par \par Patient to follow up with Dr. Malin as scheduled.\par Patient to call with further questions and concerns.\par Patient verbalizes understanding of care plan and is agreeable.\par \par

## 2023-03-10 ENCOUNTER — APPOINTMENT (OUTPATIENT)
Dept: PULMONOLOGY | Facility: CLINIC | Age: 58
End: 2023-03-10
Payer: COMMERCIAL

## 2023-03-10 VITALS
OXYGEN SATURATION: 98 % | HEART RATE: 101 BPM | SYSTOLIC BLOOD PRESSURE: 110 MMHG | BODY MASS INDEX: 26.29 KG/M2 | TEMPERATURE: 97.3 F | DIASTOLIC BLOOD PRESSURE: 80 MMHG | RESPIRATION RATE: 17 BRPM | WEIGHT: 154 LBS | HEIGHT: 64 IN

## 2023-03-10 DIAGNOSIS — R09.82 POSTNASAL DRIP: ICD-10-CM

## 2023-03-10 DIAGNOSIS — U07.1 COVID-19: ICD-10-CM

## 2023-03-10 PROCEDURE — 94010 BREATHING CAPACITY TEST: CPT

## 2023-03-10 PROCEDURE — 94727 GAS DIL/WSHOT DETER LNG VOL: CPT

## 2023-03-10 PROCEDURE — 94729 DIFFUSING CAPACITY: CPT

## 2023-03-10 PROCEDURE — ZZZZZ: CPT

## 2023-03-10 PROCEDURE — 99214 OFFICE O/P EST MOD 30 MIN: CPT | Mod: 25

## 2023-03-10 PROCEDURE — 95012 NITRIC OXIDE EXP GAS DETER: CPT

## 2023-03-10 NOTE — HISTORY OF PRESENT ILLNESS
[FreeTextEntry1] : Ms. Kunz is a 57 year old female with a history of abnormal chest CT, severe persistent asthma, BOOP, cough, eosinophilic asthma/PNA, GERD, lung nodules, PND, and SOB and was presents into the office for a pulmonary  follow up evaluation. Her chief complaint is \par \par -she notes feeling generally well \par -she notes losing weight (15lbs)\par -s/p throat surgery\par -she denies dysphonia \par -she notes exercising (walking)\par -she notes bowels are regular\par -she notes tolerating Dupixent well\par \par -she denies any headaches, nausea, emesis, fever, chills, sweats, chest pain, chest pressure, coughing, wheezing, palpitations, constipation, diarrhea, vertigo, dysphagia, heartburn, reflux, itchy eyes, itchy ears, leg swelling, leg pain, arthralgias, myalgias, or sour taste in the mouth.

## 2023-03-10 NOTE — PROCEDURE
[FreeTextEntry1] : Chest CT (12.26.2022) revealed Multiple nodular consolidative and ground-glass opacities bilaterally are either resolved or nearly resolved compared to 10/29/2022.\par A couple of nodular opacities in the right upper lobe and left lower lobe are similar to slightly more prominent compared to 10/29/2022; recommend CT chest follow-up in 3 months to assess stability.\par \par Full PFT reveals normal flows; FEV1 was 2.08L which is 82% of predicted; normal lung volumes; mild diffusion at 13.5, which is 64% of predicted; normal flow volume loop. \par \par FENO was 16; a normal value being less than 25\par Fractional exhaled nitric oxide (FENO) is regarded as a simple, noninvasive method for assessing eosinophilic airway inflammation. Produced by a variety of cells within the lung, nitric oxide (NO) concentrations are generally low in healthy individuals. However, high concentrations of NO appear to be involved in nonspecific host defense mechanisms and chronic inflammatory diseases such as asthma. The American Thor[employer] Society (ATS) therefore has recommended using FENO to aid in the diagnosis and monitoring of eosinophilic airway inflammation and asthma, and for identifying steroid responsive individuals whose chronic respiratory symptoms may be caused by airway inflammation.

## 2023-03-10 NOTE — ASSESSMENT
[FreeTextEntry1] : Ms. Kunz is a 57 year old female with a history of chronic sinus disease, GERD, Hashimoto's thyroiditis, BOOP, IgG deficiency, recent iron deficient anemia, asthma (eosinophilic) coming in to the office for pulmonary reevaluation.  She is s/p mastectomy and s/p hospitalization for "expander" Staph infection. recovered and s/p Taxol, Herceptin and Perjetta RT  for her breast cancer. s/p radiation therapy 7/2018, and presents to the office for a pulmonary follow up s/p COVID-19 pneumonitis with residual parenchymal changes 12/2020 - (+)Active BOOP- recontrolled 11/16/ VATS - s/p prednisone 3 mg QDay / covid 19 4/2022 (resolveD) - now w/ progressive CT c/w worsening BOOP/ -on prednisone 5mg, s/p VC polyp surgery 9/28/2022 - spindle cell CA - stable\par  \par problem 1a: original abnormal chest CT s/p VATS - c/w BOOP - (active) 10/2021 \par -BOOP, diagnosed by VATS in 2013 / 2019; 2021 \par -on Prednisone at 7mg/day of prednisone \par -refused 2nd opinion (Michael Hernandez) Imuran etc. \par - Information sheet given to the patient to be reviewed, this medication is never to be used without consulting the prescribing physician. Proper dietary restraint is necessary specifically salt containing foods, if any reaction may occur should be reported. 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 \par -s/p CT (last: 2/2021,6/2021, 10/2021 )- 7/2022,10/2022 -12/2022- next 6/2023\par \par problem 1B: (+) Abnormal CT (nodules) - enlarged RLL- ?EDWAR - steroid sparing regimen \par - Inflammatory disease c/w BOOP  c/w /BOOP \par - Add Zithromax 500 mg (M,W,F) 10.2022, pred 7/mg qD\par - refused Imuran 50mg qday \par -2nd opinion jose cruz \par - f/p with LFTs \par - leave Cytoxan in reserve \par -s/p Sputum Screening for AFB x 3 \par - get f/p CT 6/2023\par -  Imuran coupled with low dose prednisone and restarted Zithromax macrolide abx 7/2022 (Imuran on hold) \par -CAT scans are the only radiological modality to identify abnormalities w/in the lungs with regards to nodules/masses/lymph nodes. Risks, benefits were reviewed in detail. The guidelines for abnormalities include follow up CT scans at various intervals which could range from 6 weeks to 1 year intervals. If there is a change for the worse then consideration for a biopsy will be considered if you are a candidate. Second opinion evaluation with a thoracic surgeon or an interventional radiologist could be offered. \par \par Problem 1C:  s/p Covid-19 Pneumonitis (12/2020) - resolved- Vaccinated x3; 4/2022\par -s/p a course of Prednisone; 30 mg for 5 days, then 20 mg for 5 days, then 10 mg for 5 days (completed 1/20/2021) \par Information sheet given to the patient to be reviewed, this medication is never to be used without consulting the prescribing physician. Proper dietary restraint is necessary specifically salt containing foods, if any reaction may occur should be reported. \par -s/p monoclonal antibody infusions\par COVID-19 precautionary Immune Support Recommendations:\par -OTC Vitamin C 1000mg BID \par -OTC Quercetin 500mg BID \par -OTC Zinc 50 mg per day \par -OTC Melatonin 5 mg a night \par -OTC Vitamin D 2000mg per day \par -OTC Tonic Water 8oz per day\par -Water 0.5-1 gallon per day\par Additional Support Supplements: \par -Liposomal Glutathione 500 mg BID\par -SPM 1 tablet BID \par -Berberine 1000 mg BID  \par \par Problem 1D: Covid 19 4/11/2022- continue quarantine until day 10 (resolved) \par -day 8- No MAB\par -No Paxlovid \par \par problem 2: BOOP - confirmed-"active"\par -continue Prednisone 20 mg a day - reduce to 15 mg QDay- now 7mg QD \par -diagnosed by right VATs \par -s/p up chest CT  10/2022. 12/2022, next 6/2023\par -continue Zithromax 500 mg M/W/F (continue)\par - Rheumatologic evaluation - Dr. Ceballos al for additional Steroid sparing medications 1/2022\par \par problem 3b: eosinophilic  asthma\par -off Nucala ; Consider Fasenra or Dupixent. Fasenra on 9/17/2020 \par -Dupixent since 6/2021\par -The safety and efficacy of Nucala was established in three double-blind, randomized, placebo-controlled trials in patients with severe asthma. Compared to a placebo, patients with severe asthma receiving Nucala had fewer exacerbation requiring hospitalization and/or emergency department visits, and a longer time to first exacerbation.  In addition, patients with severe asthma receiving Nucala experienced greater reductions in their daily maintenance oral corticosteroid dose, while maintaining asthma control compared with patients receiving placebo. Treatment with Nucala did not result in a significant improvement in lung function, as measured by the volume of air exhaled by patients in one second. The most common side effects include: headache, injection site reactions, back pain, weakness, and fatigue; hypersensitivity reactions can occur within hours or days including swelling of the face, mouth, and tongue, fainting, dizziness, hives, breathing problems, and rash; herpes zoster infections have occurred. The drug is a monoclonal antibody that inhibits interleukin -5 which helps regular eosinophils, a type of white blood cell that contributes to asthma. The over-production of eosinophils can cause inflammation in the lungs, increasing the frequency of asthma attacks. Patients must also take other medications, including high dose inhaled corticosteroids and at least one additional asthma drug. \par  \par problem 3C: steroid Dependent Asthma\par - Dupixent since 6/2021\par Dupixent is a prescription medicine used with other asthma medicines for the maintenance treatment of moderate-to-severe asthma in people aged 12 years and older whose asthma is not controlled with their current asthma medicines. Dupixent helps prevent severe asthma attacks (exacerbations) and can improve your breathing. Dupixent may also help reduce the amount of oral corticosteroids you need while preventing severe asthma attacks and improving your breathing. Dupixent is not used to treat sudden breathing problems. Risks and side effect of Dupixent were discussed and reviewed with patient.\par \par problem 4: allergic rhinitis\par -recommended Xlear or nasal saline, Navage.\par -recommended Sinugator \par -Environmental measures for allergies were encouraged including mattress and pillow cover, air purifier, and environmental controls. \par \par problem 5: GERD/LPR\par -continue Pepcid 40 mg QHS \par - continue Omeprazole 20 mg or 40 mg before breakfast\par - Recommended Apple cider vineger tablets. \par -Rule of 2s: avoid eating too much, eating too late, eating too spicy, eating two hours before bed\par -Things to avoid including overeating, spicy foods, tight clothing, eating within three hours of bed, this list is not all inclusive. \par -For treatment of reflux, possible options discussed including diet control, H2 blockers, PPIs, as well as coating motility agents discussed as treatment options. Timing of meals and proximity of last meal to sleep were discussed. If symptoms persist, a formal gastrointestinal evaluation is needed. \par \par Problem 5A: Pulmonary Pre-Op Clearance for vocal cord surgery \par -at this point in time there are no absolute pulmonary contraindications to go forward with the planned procedure \par -at the time of surgery s/he should have optimal pain control, incentive spirometry, early ambulation, DVT and GI prophylaxis. \par \par problem 6: sensory neuropathic cough (controlled)\par -on Neurontin 100 QSH\par -off Amitriptyline 10 mg up to TID, QHS \par -s/p Baclofen 10 mg pre-meal and QHS\par -continue Tessalon Perles 200 mg qHS\par -Sensory neuropathic cough is an etiology of cough that is often realized once someone has been ruled out for common disease such as: asthma, COPD, eosinophilic bronchitis, bronchiectasis, post nasal drip, and GERD. It sometimes develops following a URI, herpes zoster outbreak in pharynx or thyroid or cervical spine injury. However, many patients have no identifiable antecedent explanation. \par \par problem 7: r/o diaphragm dysfunction\par -fluoroscopy of the diaphragm was all normal\par \par problem 8: low immunity \par -continue to use Gammagard weekly \par -recommended early intervention  \par -s/p Prevnar 13 vaccine / Pneumovax\par \par problem 9: health maintenance\par -s/p Covid 19 vaccine x 3\par -s/p 2022 flu shot\par -s/p Shingrix vaccine\par -recommended strep pneumonia vaccines: Prevnar-13 vaccine, followed by Pneumo vaccine 23 on year following\par -recommended early intervention for URIs\par -recommended osteoporosis evaluations\par -recommended early dermatological evaluations\par -recommended after the age of 50 to the age of 70, colonoscopy every 5 years\par \par F/U in 3 months with SPI and NiOx\par She is encouraged to call with any changes, concerns, or questions \par \par

## 2023-03-10 NOTE — ADDENDUM
[FreeTextEntry1] : Documented by SHONDA Jones acting as a scribe for Dr. Munir Malin on 03/10/2023 .\par \par All medical record entries made by the Scribe were at my, Dr. Munir Malin's, direction and personally dictated by me on 03/10/2023. I have reviewed the chart and agree that the record accurately reflects my personal performance of the history, physical exam, assessment and plan. I have also personally directed, reviewed, and agree with the discharge instructions.

## 2023-03-22 ENCOUNTER — RX RENEWAL (OUTPATIENT)
Age: 58
End: 2023-03-22

## 2023-03-28 ENCOUNTER — RX RENEWAL (OUTPATIENT)
Age: 58
End: 2023-03-28

## 2023-03-29 ENCOUNTER — TRANSCRIPTION ENCOUNTER (OUTPATIENT)
Age: 58
End: 2023-03-29

## 2023-03-30 ENCOUNTER — TRANSCRIPTION ENCOUNTER (OUTPATIENT)
Age: 58
End: 2023-03-30

## 2023-03-31 ENCOUNTER — RX RENEWAL (OUTPATIENT)
Age: 58
End: 2023-03-31

## 2023-04-05 ENCOUNTER — APPOINTMENT (OUTPATIENT)
Dept: PULMONOLOGY | Facility: CLINIC | Age: 58
End: 2023-04-05

## 2023-05-13 ENCOUNTER — RX RENEWAL (OUTPATIENT)
Age: 58
End: 2023-05-13

## 2023-05-28 ENCOUNTER — RX RENEWAL (OUTPATIENT)
Age: 58
End: 2023-05-28

## 2023-06-12 ENCOUNTER — RX RENEWAL (OUTPATIENT)
Age: 58
End: 2023-06-12

## 2023-07-08 ENCOUNTER — APPOINTMENT (OUTPATIENT)
Dept: CT IMAGING | Facility: CLINIC | Age: 58
End: 2023-07-08
Payer: COMMERCIAL

## 2023-07-08 ENCOUNTER — OUTPATIENT (OUTPATIENT)
Dept: OUTPATIENT SERVICES | Facility: HOSPITAL | Age: 58
LOS: 1 days | End: 2023-07-08
Payer: COMMERCIAL

## 2023-07-08 DIAGNOSIS — Z90.10 ACQUIRED ABSENCE OF UNSPECIFIED BREAST AND NIPPLE: Chronic | ICD-10-CM

## 2023-07-08 DIAGNOSIS — Z98.89 OTHER SPECIFIED POSTPROCEDURAL STATES: Chronic | ICD-10-CM

## 2023-07-08 DIAGNOSIS — M71.30 OTHER BURSAL CYST, UNSPECIFIED SITE: Chronic | ICD-10-CM

## 2023-07-08 DIAGNOSIS — Z98.82 BREAST IMPLANT STATUS: Chronic | ICD-10-CM

## 2023-07-08 DIAGNOSIS — R93.89 ABNORMAL FINDINGS ON DIAGNOSTIC IMAGING OF OTHER SPECIFIED BODY STRUCTURES: ICD-10-CM

## 2023-07-08 DIAGNOSIS — Z98.1 ARTHRODESIS STATUS: Chronic | ICD-10-CM

## 2023-07-08 DIAGNOSIS — Z00.8 ENCOUNTER FOR OTHER GENERAL EXAMINATION: ICD-10-CM

## 2023-07-08 PROCEDURE — 71250 CT THORAX DX C-: CPT | Mod: 26

## 2023-07-08 PROCEDURE — 71250 CT THORAX DX C-: CPT

## 2023-07-14 ENCOUNTER — NON-APPOINTMENT (OUTPATIENT)
Age: 58
End: 2023-07-14

## 2023-07-14 ENCOUNTER — APPOINTMENT (OUTPATIENT)
Dept: PULMONOLOGY | Facility: CLINIC | Age: 58
End: 2023-07-14
Payer: COMMERCIAL

## 2023-07-14 VITALS
SYSTOLIC BLOOD PRESSURE: 114 MMHG | BODY MASS INDEX: 26.29 KG/M2 | TEMPERATURE: 97.6 F | OXYGEN SATURATION: 97 % | DIASTOLIC BLOOD PRESSURE: 70 MMHG | HEIGHT: 64 IN | RESPIRATION RATE: 16 BRPM | WEIGHT: 154 LBS | HEART RATE: 89 BPM

## 2023-07-14 DIAGNOSIS — J84.89 OTHER SPECIFIED INTERSTITIAL PULMONARY DISEASES: ICD-10-CM

## 2023-07-14 PROCEDURE — 95012 NITRIC OXIDE EXP GAS DETER: CPT

## 2023-07-14 PROCEDURE — 94727 GAS DIL/WSHOT DETER LNG VOL: CPT

## 2023-07-14 PROCEDURE — 94729 DIFFUSING CAPACITY: CPT

## 2023-07-14 PROCEDURE — ZZZZZ: CPT

## 2023-07-14 PROCEDURE — 99214 OFFICE O/P EST MOD 30 MIN: CPT | Mod: 25

## 2023-07-14 PROCEDURE — 94010 BREATHING CAPACITY TEST: CPT

## 2023-07-14 NOTE — HISTORY OF PRESENT ILLNESS
[FreeTextEntry1] : Ms. Kunz is a 58 year old female with a history of abnormal chest CT, severe persistent asthma, BOOP, cough, eosinophilic asthma/PNA, GERD, lung nodules, PND, and SOB who presents into the office for a follow-up pulmonary evaluation. Her chief complaint is \par -she notes feeling generally well\par -she denies having trouble swallowing \par -she notes good quality of sleep \par -she notes sleeping for 7-8 hours\par -she notes bowels are regular\par -she notes exercising (walking)\par -she notes dysphonia \par -she denies taking any new medications, vitamins, or supplements \par -she notes sinuses are quiet \par - she notes prior URI which did not drop into her chest \par - she notes on dupixent and doing well\par -\par \par \par -she denies any headaches, nausea, emesis, fever, chills, sweats, chest pain, chest pressure, coughing, wheezing, palpitations, diarrhea, constipation, dysphagia, vertigo, arthralgias, myalgias, leg swelling, itchy eyes, itchy ears, heartburn, reflux, or sour taste in the mouth.\par

## 2023-07-14 NOTE — ADDENDUM
[FreeTextEntry1] : Documented by Iman Dobbins acting as a scribe for Dr. Munir Malin on 07/13/2023. All medical record entries made by the Scribe were at my, Dr. Munir Malin's, direction and personally dictated by me on 07/13/2023. I have reviewed the chart and agree that the record accurately reflects my personal performance of the history, physical exam, assessment and plan. I have also personally directed, reviewed, and agree with the discharge instructions.\par \par

## 2023-07-14 NOTE — ASSESSMENT
[FreeTextEntry1] : Ms. Kunz is a 58 year old female with a history of chronic sinus disease, GERD, Hashimoto's thyroiditis, BOOP, IgG deficiency, recent iron deficient anemia, asthma (eosinophilic) coming in to the office for follow-up pulmonary evaluation.  She is s/p mastectomy and s/p hospitalization for "expander" Staph infection. recovered and s/p Taxol, Herceptin and Perjetta RT  for her breast cancer. s/p radiation therapy 7/2018, and presents to the office for a pulmonary follow up s/p COVID-19 pneumonitis with residual parenchymal changes 12/2020 - (+)Active BOOP- recontrolled 11/16/ VATS - s/p prednisone 3 mg QDay / covid 19 4/2022 (resolveD) - s/p progressive CT c/w worsening BOOP/ -on prednisone 7mg, s/p VC polyp surgery 9/28/2022 - spindle cell CA - stable, on Dupixent \par  \par problem 1a: original abnormal chest CT s/p VATS - c/w BOOP - (active) 10/2021 \par -BOOP, diagnosed by VATS in 2013 / 2019; 2021 \par -on Prednisone at 7mg/day of prednisone \par -refused 2nd opinion (Michael Hernandez) Imuran etc. \par - Information sheet given to the patient to be reviewed, this medication is never to be used without consulting the prescribing physician. Proper dietary restraint is necessary specifically salt containing foods, if any reaction may occur should be reported. 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 \par -s/p CT (last: 2/2021,6/2021, 10/2021 )- 7/2022,10/2022 -12/2022- next 7/2023 (pending result)\par \par problem 1B: (+) Abnormal CT (nodules) - enlarged RLL- ?EDWAR - steroid sparing regimen \par - Inflammatory disease c/w BOOP  c/w /BOOP \par - Add Zithromax 500 mg (M,W,F) 10.2022, pred 7/mg qD\par - refused Imuran 50mg qday \par -2nd opinion jose cruz \par - f/p with LFTs \par - leave Cytoxan in reserve \par -s/p Sputum Screening for AFB x 3 \par - f/p CT 7/2023 - pending result \par -  Imuran coupled with low dose prednisone and restarted Zithromax macrolide abx 7/2022 (Imuran on hold) \par -CAT scans are the only radiological modality to identify abnormalities w/in the lungs with regards to nodules/masses/lymph nodes. Risks, benefits were reviewed in detail. The guidelines for abnormalities include follow up CT scans at various intervals which could range from 6 weeks to 1 year intervals. If there is a change for the worse then consideration for a biopsy will be considered if you are a candidate. Second opinion evaluation with a thoracic surgeon or an interventional radiologist could be offered. \par \par Problem 1C:  s/p Covid-19 Pneumonitis (12/2020) - resolved- Vaccinated x3; 4/2022\par -s/p a course of Prednisone; 30 mg for 5 days, then 20 mg for 5 days, then 10 mg for 5 days (completed 1/20/2021) \par Information sheet given to the patient to be reviewed, this medication is never to be used without consulting the prescribing physician. Proper dietary restraint is necessary specifically salt containing foods, if any reaction may occur should be reported. \par -s/p monoclonal antibody infusions\par COVID-19 precautionary Immune Support Recommendations:\par -OTC Vitamin C 1000mg BID \par -OTC Quercetin 500mg BID \par -OTC Zinc 50 mg per day \par -OTC Melatonin 5 mg a night \par -OTC Vitamin D 2000mg per day \par -OTC Tonic Water 8oz per day\par -Water 0.5-1 gallon per day\par Additional Support Supplements: \par -Liposomal Glutathione 500 mg BID\par -SPM 1 tablet BID \par -Berberine 1000 mg BID  \par \par Problem 1D: Covid 19 4/11/2022- continue quarantine until day 10 (resolved) \par -day 8- No MAB\par -No Paxlovid \par \par problem 2: BOOP - confirmed-"active"\par -continue Prednisone 20 mg a day - reduce to 15 mg QDay- now 7mg QD \par -diagnosed by right VATs \par -s/p up chest CT  10/2022. 12/2022, next 6/2023\par -continue Zithromax 500 mg M/W/F (continue)\par - Rheumatologic evaluation - Dr. Ceballos al for additional Steroid sparing medications 1/2022\par \par problem 3b: eosinophilic  asthma\par -off Nucala ; Consider Fasenra or Dupixent. Fasenra on 9/17/2020 \par -Dupixent since 6/2021\par -The safety and efficacy of Nucala was established in three double-blind, randomized, placebo-controlled trials in patients with severe asthma. Compared to a placebo, patients with severe asthma receiving Nucala had fewer exacerbation requiring hospitalization and/or emergency department visits, and a longer time to first exacerbation.  In addition, patients with severe asthma receiving Nucala experienced greater reductions in their daily maintenance oral corticosteroid dose, while maintaining asthma control compared with patients receiving placebo. Treatment with Nucala did not result in a significant improvement in lung function, as measured by the volume of air exhaled by patients in one second. The most common side effects include: headache, injection site reactions, back pain, weakness, and fatigue; hypersensitivity reactions can occur within hours or days including swelling of the face, mouth, and tongue, fainting, dizziness, hives, breathing problems, and rash; herpes zoster infections have occurred. The drug is a monoclonal antibody that inhibits interleukin -5 which helps regular eosinophils, a type of white blood cell that contributes to asthma. The over-production of eosinophils can cause inflammation in the lungs, increasing the frequency of asthma attacks. Patients must also take other medications, including high dose inhaled corticosteroids and at least one additional asthma drug. \par  \par problem 3C: steroid Dependent Asthma\par - Dupixent since 6/2021\par Dupixent is a prescription medicine used with other asthma medicines for the maintenance treatment of moderate-to-severe asthma in people aged 12 years and older whose asthma is not controlled with their current asthma medicines. Dupixent helps prevent severe asthma attacks (exacerbations) and can improve your breathing. Dupixent may also help reduce the amount of oral corticosteroids you need while preventing severe asthma attacks and improving your breathing. Dupixent is not used to treat sudden breathing problems. Risks and side effect of Dupixent were discussed and reviewed with patient.\par \par problem 4: allergic rhinitis\par -recommended Xlear or nasal saline, Navage.\par -recommended Sinugator \par -Environmental measures for allergies were encouraged including mattress and pillow cover, air purifier, and environmental controls. \par \par problem 5: GERD/LPR\par -continue Pepcid 40 mg QHS \par - continue Omeprazole 20 mg or 40 mg before breakfast\par - Recommended Apple cider vineger tablets. \par -Rule of 2s: avoid eating too much, eating too late, eating too spicy, eating two hours before bed\par -Things to avoid including overeating, spicy foods, tight clothing, eating within three hours of bed, this list is not all inclusive. \par -For treatment of reflux, possible options discussed including diet control, H2 blockers, PPIs, as well as coating motility agents discussed as treatment options. Timing of meals and proximity of last meal to sleep were discussed. If symptoms persist, a formal gastrointestinal evaluation is needed. \par \par Problem 5A: Pulmonary Pre-Op Clearance for vocal cord surgery (on hold)\par -at this point in time there are no absolute pulmonary contraindications to go forward with the planned procedure \par -at the time of surgery s/he should have optimal pain control, incentive spirometry, early ambulation, DVT and GI prophylaxis. \par \par problem 6: sensory neuropathic cough (controlled)\par -on Neurontin 100 QSH\par -off Amitriptyline 10 mg up to TID, QHS \par -s/p Baclofen 10 mg pre-meal and QHS\par -continue Tessalon Perles 200 mg qHS\par -Sensory neuropathic cough is an etiology of cough that is often realized once someone has been ruled out for common disease such as: asthma, COPD, eosinophilic bronchitis, bronchiectasis, post nasal drip, and GERD. It sometimes develops following a URI, herpes zoster outbreak in pharynx or thyroid or cervical spine injury. However, many patients have no identifiable antecedent explanation. \par \par problem 7: r/o diaphragm dysfunction\par -fluoroscopy of the diaphragm was all normal\par \par problem 8: low immunity \par -continue to use Gammagard weekly \par -recommended early intervention  \par -s/p Prevnar 13 vaccine / Pneumovax\par \par problem 9: health maintenance\par -s/p Covid 19 vaccine x 3\par -s/p 2022 flu shot\par -s/p Shingrix vaccine\par -recommended strep pneumonia vaccines: Prevnar-13 vaccine, followed by Pneumo vaccine 23 on year following\par -recommended early intervention for URIs\par -recommended osteoporosis evaluations\par -recommended early dermatological evaluations\par -recommended after the age of 50 to the age of 70, colonoscopy every 5 years\par \par F/U in 3 months with SPI and NiOx\par She is encouraged to call with any changes, concerns, or questions \par \par

## 2023-07-14 NOTE — PROCEDURE
[FreeTextEntry1] : Chest CT (7/8/2023) Revealed:\par Since 12/26/2022 new small 5mm nodules in the right lung. Recommended surveillance chest CT in 3-6 months. \par \par Blood CBC (7/3/2023): WBC 4.5 , HGB 14.1 , HCT 44.9, , EOS WNL , CMP / CREATININE  WNL, TG WNL\par \par Full PFT reveals normal flows; FEV1 was 2.26  L which is 90 % of predicted; normal lung volumes; normal diffusion at  15.3 , which is 73% of predicted; normal flow volume loop.\par PFTs were performed to evaluate for SOB and asthma \par \par FENO was 18  ; a normal value being less than 25\par Fractional exhaled nitric oxide (FENO) is regarded as a simple, noninvasive method for assessing eosinophilic airway inflammation. Produced by a variety of cells within the lung, nitric oxide (NO) concentrations are generally low in healthy individuals. However, high concentrations of NO appear to be involved in nonspecific host defense mechanisms and chronic inflammatory diseases such as asthma. The American Thoracic Society (ATS) therefore has recommended using FENO to aid in the diagnosis and monitoring of eosinophilic airway inflammation and asthma, and for identifying steroid responsive individuals whose chronic respiratory symptoms may be caused by airway inflammation.

## 2023-07-18 ENCOUNTER — NON-APPOINTMENT (OUTPATIENT)
Age: 58
End: 2023-07-18

## 2023-07-18 ENCOUNTER — RX RENEWAL (OUTPATIENT)
Age: 58
End: 2023-07-18

## 2023-08-18 ENCOUNTER — RX RENEWAL (OUTPATIENT)
Age: 58
End: 2023-08-18

## 2023-08-18 RX ORDER — ALBUTEROL SULFATE 90 UG/1
108 (90 BASE) INHALANT RESPIRATORY (INHALATION)
Qty: 3 | Refills: 1 | Status: ACTIVE | COMMUNITY
Start: 2021-08-16 | End: 1900-01-01

## 2023-08-30 ENCOUNTER — NON-APPOINTMENT (OUTPATIENT)
Age: 58
End: 2023-08-30

## 2023-08-31 NOTE — DISCHARGE NOTE NURSING/CASE MANAGEMENT/SOCIAL WORK - CAREGIVER NAME
Hi, this is [facility]. I'm one of the nurses with [facility] home health. I'm calling in regards to a mutual patient, Zoila White. Her YOB: 1929. I was just calling to see if someone could call her in regards to her blood work. She had a BMP drawn on 829 the day after her doctor's appointment and I was just concerned because her BUN has gone from 16 to 28 and then her CO2 is 36. You can give me a call back at [phone] or if you just want to call the patient's home, the number is [phone]. The mobile number is [phone]. Thank you.  (msg verified)    MD Audrey Bolanos MA  Labs reviewed--no concerns--Cr/GFR stable---BUN reflects sl dehydr--CO2 stable       [facility] advised as per Dr. Tod Irwin -- she states she will then plan on discharging her. rigo almonte

## 2023-09-17 ENCOUNTER — RX RENEWAL (OUTPATIENT)
Age: 58
End: 2023-09-17

## 2023-09-21 ENCOUNTER — APPOINTMENT (OUTPATIENT)
Dept: OBGYN | Facility: CLINIC | Age: 58
End: 2023-09-21
Payer: COMMERCIAL

## 2023-09-21 VITALS
OXYGEN SATURATION: 97 % | HEIGHT: 64 IN | DIASTOLIC BLOOD PRESSURE: 72 MMHG | HEART RATE: 60 BPM | TEMPERATURE: 97.9 F | WEIGHT: 153 LBS | SYSTOLIC BLOOD PRESSURE: 112 MMHG | BODY MASS INDEX: 26.12 KG/M2

## 2023-09-21 DIAGNOSIS — N89.8 OTHER SPECIFIED NONINFLAMMATORY DISORDERS OF VAGINA: ICD-10-CM

## 2023-09-21 DIAGNOSIS — Z01.419 ENCOUNTER FOR GYNECOLOGICAL EXAMINATION (GENERAL) (ROUTINE) W/OUT ABNORMAL FINDINGS: ICD-10-CM

## 2023-09-21 DIAGNOSIS — R35.0 FREQUENCY OF MICTURITION: ICD-10-CM

## 2023-09-21 DIAGNOSIS — Z15.02 GENETIC SUSCEPTIBILITY TO MALIGNANT NEOPLASM OF OVARY: ICD-10-CM

## 2023-09-21 LAB
BILIRUB UR QL STRIP: NEGATIVE
CLARITY UR: CLEAR
COLLECTION METHOD: NORMAL
GLUCOSE UR-MCNC: NEGATIVE
HCG UR QL: 0.2 EU/DL
HGB UR QL STRIP.AUTO: NORMAL
KETONES UR-MCNC: NEGATIVE
LEUKOCYTE ESTERASE UR QL STRIP: NEGATIVE
NITRITE UR QL STRIP: NEGATIVE
PH UR STRIP: 5.5
PROT UR STRIP-MCNC: NEGATIVE
SP GR UR STRIP: 1.01

## 2023-09-21 PROCEDURE — 99396 PREV VISIT EST AGE 40-64: CPT

## 2023-09-21 PROCEDURE — 99213 OFFICE O/P EST LOW 20 MIN: CPT | Mod: 25

## 2023-09-21 RX ORDER — DENOSUMAB 60 MG/ML
60 INJECTION SUBCUTANEOUS
Refills: 0 | Status: ACTIVE | COMMUNITY

## 2023-09-21 RX ORDER — FLUCONAZOLE 150 MG/1
150 TABLET ORAL
Qty: 3 | Refills: 3 | Status: ACTIVE | COMMUNITY
Start: 2023-09-21 | End: 1900-01-01

## 2023-09-30 ENCOUNTER — RX RENEWAL (OUTPATIENT)
Age: 58
End: 2023-09-30

## 2023-10-04 LAB
BACTERIA UR CULT: NORMAL
CYTOLOGY CVX/VAG DOC THIN PREP: ABNORMAL
HPV HIGH+LOW RISK DNA PNL CVX: NOT DETECTED

## 2023-10-17 ENCOUNTER — APPOINTMENT (OUTPATIENT)
Dept: PULMONOLOGY | Facility: CLINIC | Age: 58
End: 2023-10-17
Payer: COMMERCIAL

## 2023-10-17 VITALS
WEIGHT: 151 LBS | HEIGHT: 64 IN | BODY MASS INDEX: 25.78 KG/M2 | OXYGEN SATURATION: 97 % | HEART RATE: 80 BPM | TEMPERATURE: 96.8 F | SYSTOLIC BLOOD PRESSURE: 112 MMHG | RESPIRATION RATE: 16 BRPM | DIASTOLIC BLOOD PRESSURE: 72 MMHG

## 2023-10-17 PROCEDURE — 95012 NITRIC OXIDE EXP GAS DETER: CPT

## 2023-10-17 PROCEDURE — 94010 BREATHING CAPACITY TEST: CPT

## 2023-10-17 PROCEDURE — 99214 OFFICE O/P EST MOD 30 MIN: CPT | Mod: 25

## 2023-10-17 RX ORDER — MOMETASONE FUROATE 220 UG/1
220 INHALANT RESPIRATORY (INHALATION)
Qty: 3 | Refills: 1 | Status: DISCONTINUED | COMMUNITY
Start: 2019-05-10 | End: 2023-10-17

## 2023-10-17 RX ORDER — PREDNISONE 10 MG/1
10 TABLET ORAL
Qty: 60 | Refills: 0 | Status: DISCONTINUED | COMMUNITY
Start: 2020-02-27 | End: 2023-10-17

## 2023-10-17 RX ORDER — HYDROCODONE BITARTRATE AND HOMATROPINE METHYLBROMIDE 5; 1.5 MG/5ML; MG/5ML
5-1.5 SYRUP ORAL
Qty: 1 | Refills: 0 | Status: DISCONTINUED | COMMUNITY
Start: 2019-12-10 | End: 2023-10-17

## 2023-10-17 RX ORDER — PREDNISONE 10 MG/1
10 TABLET ORAL
Qty: 50 | Refills: 0 | Status: DISCONTINUED | COMMUNITY
Start: 2021-01-13 | End: 2023-10-17

## 2023-10-17 RX ORDER — BACLOFEN 10 MG/1
10 TABLET ORAL 3 TIMES DAILY
Qty: 90 | Refills: 5 | Status: DISCONTINUED | COMMUNITY
Start: 2020-04-02 | End: 2023-10-17

## 2023-10-17 RX ORDER — PROMETHAZINE HYDROCHLORIDE AND DEXTROMETHORPHAN HYDROBROMIDE ORAL SOLUTION 15; 6.25 MG/5ML; MG/5ML
6.25-15 SOLUTION ORAL
Qty: 480 | Refills: 1 | Status: DISCONTINUED | COMMUNITY
Start: 2019-11-27 | End: 2023-10-17

## 2023-10-17 RX ORDER — GLYCOPYRROLATE AND FORMOTEROL FUMARATE 9; 4.8 UG/1; UG/1
9-4.8 AEROSOL, METERED RESPIRATORY (INHALATION)
Qty: 3 | Refills: 1 | Status: ACTIVE | COMMUNITY
Start: 2019-05-10 | End: 1900-01-01

## 2023-10-17 RX ORDER — IPRATROPIUM BROMIDE 17 UG/1
17 AEROSOL, METERED RESPIRATORY (INHALATION)
Qty: 13 | Refills: 0 | Status: DISCONTINUED | COMMUNITY
Start: 2018-02-02 | End: 2023-10-17

## 2023-10-17 RX ORDER — FLUTICASONE FUROATE 200 UG/1
200 POWDER RESPIRATORY (INHALATION)
Qty: 3 | Refills: 1 | Status: ACTIVE | COMMUNITY
Start: 2023-03-30 | End: 1900-01-01

## 2023-10-17 RX ORDER — GABAPENTIN 100 MG/1
100 CAPSULE ORAL 3 TIMES DAILY
Qty: 90 | Refills: 5 | Status: DISCONTINUED | COMMUNITY
Start: 2021-09-20 | End: 2023-10-17

## 2023-10-17 RX ORDER — FLUTICASONE FUROATE AND VILANTEROL TRIFENATATE 200; 25 UG/1; UG/1
200-25 POWDER RESPIRATORY (INHALATION) DAILY
Qty: 3 | Refills: 1 | Status: DISCONTINUED | COMMUNITY
Start: 2017-12-06 | End: 2023-10-17

## 2023-10-17 RX ORDER — PREDNISONE 10 MG/1
10 TABLET ORAL
Qty: 90 | Refills: 1 | Status: DISCONTINUED | COMMUNITY
Start: 2020-04-02 | End: 2023-10-17

## 2023-10-17 RX ORDER — GUAIFENESIN AND CODEINE PHOSPHATE 10; 100 MG/5ML; MG/5ML
100-10 SOLUTION ORAL
Qty: 590 | Refills: 0 | Status: DISCONTINUED | COMMUNITY
Start: 2018-02-02 | End: 2023-10-17

## 2023-10-17 RX ORDER — PROMETHAZINE HYDROCHLORIDE AND DEXTROMETHORPHAN HYDROBROMIDE ORAL SOLUTION 15; 6.25 MG/5ML; MG/5ML
6.25-15 SOLUTION ORAL
Qty: 473 | Refills: 1 | Status: DISCONTINUED | COMMUNITY
Start: 2022-08-26 | End: 2023-10-17

## 2023-10-17 RX ORDER — PREDNISONE 5 MG/1
5 TABLET ORAL
Qty: 90 | Refills: 1 | Status: DISCONTINUED | COMMUNITY
Start: 2022-07-25 | End: 2023-10-17

## 2023-10-17 RX ORDER — PREDNISONE 5 MG/1
5 TABLET ORAL
Qty: 100 | Refills: 1 | Status: DISCONTINUED | COMMUNITY
Start: 2021-02-09 | End: 2023-10-17

## 2023-10-17 RX ORDER — BENZONATATE 200 MG/1
200 CAPSULE ORAL 3 TIMES DAILY
Qty: 60 | Refills: 2 | Status: DISCONTINUED | COMMUNITY
Start: 2019-03-26 | End: 2023-10-17

## 2023-10-17 RX ORDER — HYDROCODONE BITARTRATE AND HOMATROPINE METHYLBROMIDE 5; 1.5 MG/5ML; MG/5ML
5-1.5 SOLUTION ORAL 4 TIMES DAILY
Qty: 240 | Refills: 0 | Status: DISCONTINUED | COMMUNITY
Start: 2020-03-02 | End: 2023-10-17

## 2023-10-17 RX ORDER — MOMETASONE FUROATE 200 UG/1
200 AEROSOL RESPIRATORY (INHALATION)
Qty: 3 | Refills: 1 | Status: DISCONTINUED | COMMUNITY
Start: 2019-04-11 | End: 2023-10-17

## 2023-10-17 RX ORDER — FLUTICASONE PROPIONATE 50 UG/1
50 SPRAY, METERED NASAL TWICE DAILY
Qty: 48 | Refills: 1 | Status: DISCONTINUED | COMMUNITY
Start: 2019-05-03 | End: 2023-10-17

## 2023-10-17 RX ORDER — HYDROCODONE BITARTRATE AND HOMATROPINE METHYLBROMIDE 5; 1.5 MG/5ML; MG/5ML
5-1.5 SOLUTION ORAL
Qty: 1500 | Refills: 0 | Status: DISCONTINUED | COMMUNITY
Start: 2019-11-27 | End: 2023-10-17

## 2023-10-17 RX ORDER — FLUTICASONE FUROATE 200 UG/1
200 POWDER RESPIRATORY (INHALATION)
Qty: 90 | Refills: 1 | Status: DISCONTINUED | COMMUNITY
Start: 2021-06-01 | End: 2023-10-17

## 2023-10-17 RX ORDER — ALBUTEROL SULFATE 90 UG/1
108 (90 BASE) POWDER, METERED RESPIRATORY (INHALATION)
Qty: 1 | Refills: 0 | Status: DISCONTINUED | COMMUNITY
Start: 2019-03-13 | End: 2023-10-17

## 2023-11-16 ENCOUNTER — RX RENEWAL (OUTPATIENT)
Age: 58
End: 2023-11-16

## 2023-11-16 RX ORDER — PREDNISONE 1 MG/1
1 TABLET ORAL
Qty: 100 | Refills: 1 | Status: ACTIVE | COMMUNITY
Start: 2021-03-01 | End: 1900-01-01

## 2023-11-16 RX ORDER — PREDNISONE 5 MG/1
5 TABLET ORAL
Qty: 100 | Refills: 1 | Status: ACTIVE | COMMUNITY
Start: 2020-06-08 | End: 1900-01-01

## 2023-11-28 NOTE — ASU PATIENT PROFILE, ADULT - DATE OF LAST VACCINATION
Bed: 17  Expected date:   Expected time:   Means of arrival:   Comments:  89 Male  Unwitnessed fall  [facility] in [place]  Dropped off laundry at 1300 and between [id number] tripped over the laundry basket  and hit his head.   lac to posterior scalp- bleeding controlled   134/72  70  24 RR (normal) 97%RA BGL- 217    17-Dec-2021

## 2023-12-19 ENCOUNTER — RX RENEWAL (OUTPATIENT)
Age: 58
End: 2023-12-19

## 2024-01-04 ENCOUNTER — OUTPATIENT (OUTPATIENT)
Dept: OUTPATIENT SERVICES | Facility: HOSPITAL | Age: 59
LOS: 1 days | End: 2024-01-04
Payer: COMMERCIAL

## 2024-01-04 ENCOUNTER — APPOINTMENT (OUTPATIENT)
Dept: CT IMAGING | Facility: CLINIC | Age: 59
End: 2024-01-04
Payer: COMMERCIAL

## 2024-01-04 DIAGNOSIS — Z90.10 ACQUIRED ABSENCE OF UNSPECIFIED BREAST AND NIPPLE: Chronic | ICD-10-CM

## 2024-01-04 DIAGNOSIS — Z98.82 BREAST IMPLANT STATUS: Chronic | ICD-10-CM

## 2024-01-04 DIAGNOSIS — Z98.1 ARTHRODESIS STATUS: Chronic | ICD-10-CM

## 2024-01-04 DIAGNOSIS — R93.89 ABNORMAL FINDINGS ON DIAGNOSTIC IMAGING OF OTHER SPECIFIED BODY STRUCTURES: ICD-10-CM

## 2024-01-04 DIAGNOSIS — Z98.89 OTHER SPECIFIED POSTPROCEDURAL STATES: Chronic | ICD-10-CM

## 2024-01-04 DIAGNOSIS — M71.30 OTHER BURSAL CYST, UNSPECIFIED SITE: Chronic | ICD-10-CM

## 2024-01-04 PROCEDURE — 71250 CT THORAX DX C-: CPT

## 2024-01-04 PROCEDURE — 71250 CT THORAX DX C-: CPT | Mod: 26

## 2024-01-16 ENCOUNTER — APPOINTMENT (OUTPATIENT)
Dept: PULMONOLOGY | Facility: CLINIC | Age: 59
End: 2024-01-16
Payer: COMMERCIAL

## 2024-01-16 ENCOUNTER — APPOINTMENT (OUTPATIENT)
Dept: PULMONOLOGY | Facility: CLINIC | Age: 59
End: 2024-01-16

## 2024-01-16 VITALS — HEIGHT: 64 IN | BODY MASS INDEX: 25.78 KG/M2 | WEIGHT: 151 LBS

## 2024-01-16 DIAGNOSIS — D80.9 IMMUNODEFICIENCY WITH PREDOMINANTLY ANTIBODY DEFECTS, UNSPECIFIED: ICD-10-CM

## 2024-01-16 PROCEDURE — 99214 OFFICE O/P EST MOD 30 MIN: CPT | Mod: 95

## 2024-01-16 NOTE — REASON FOR VISIT
[Follow-Up] : a follow-up visit [FreeTextEntry1] : via video call-s/p COVID-19 infection 12/2020, abnormal chest CT, severe persistent asthma, BOOP, cough, eosinophilic asthma/PNA, GERD, lung nodules, PND, and SOB

## 2024-01-16 NOTE — ADDENDUM
[FreeTextEntry1] :  Documented by Britton Hightower acting as a scribe for Dr. Munir Malin on 01/16/2024.   All medical record entries made by the Scribe were at my, Dr. Munir Malin's, direction and personally dictated by me on 01/16/2024. I have reviewed the chart and agree that the record accurately reflects my personal performance of the history, physical exam, assessment and plan. I have also personally directed, reviewed, and agree with the discharge instructions.

## 2024-01-16 NOTE — ASSESSMENT
[FreeTextEntry1] : Ms. Kunz is a 58 year old female with a history of chronic sinus disease, GERD, Hashimoto's thyroiditis, BOOP, IgG deficiency, recent iron deficient anemia, asthma (eosinophilic) coming in to the office for follow-up pulmonary evaluation. She is s/p mastectomy and s/p hospitalization for "expander" Staph infection. recovered and s/p Taxol, Herceptin and Perjetta RT for her breast cancer. s/p radiation therapy 7/2018, and presents to the office via video call for a pulmonary follow up s/p COVID-19 pneumonitis with residual parenchymal changes 12/2020 - (+)Active BOOP- recontrolled 11/16/ VATS - s/p prednisone 3 mg QDay / covid 19 4/2022 (resolveD) - s/p progressive CT c/w worsening BOOP/ -on prednisone 7mg, s/p VC polyp surgery 9/28/2022 - spindle cell CA - stable, on Dupixent - but progressive CT 1/204 - minimally symptomatic  problem 1a: original abnormal chest CT s/p VATS - c/w BOOP - (active) 10/2021 -BOOP, diagnosed by VATS in 2013 / 2019; 2021 -on Prednisone at 7mg/day of prednisone - boost to 15 mg/day for next 6 weeks -refused 2nd opinion ([facility]) Imuran etc. - Information sheet given to the patient to be reviewed, this medication is never to be used without consulting the prescribing physician. Proper dietary restraint is necessary specifically salt containing foods, if any reaction may occur should be reported. 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 -s/p CT (2/2021,6/2021, 10/2021 )- 7/2022,10/2022,12/2022, 7/2023, 1/2024 - repeat CT 3/2024  problem 1B: (+) Abnormal CT (nodules) - enlarged RLL- ?DEWAR - steroid sparing regimen - Inflammatory disease c/w BOOP c/w /BOOP - Add Zithromax 500 mg (M,W,F) 10.2022, pred 7/mg qD - move to 15 mg per day for the next 6 weeks - refused Imuran 50mg qday -2nd opinion Rodriguez et al - f/p with LFTs - Cytoxan in reserve if worsens -s/p Sputum Screening for AFB x 3 - f/p CT 7/2023 - 1/2024 - Imuran coupled with low dose prednisone and restarted Zithromax macrolide abx 7/2022 (Imuran on hold) -CAT scans are the only radiological modality to identify abnormalities w/in the lungs with regards to nodules/masses/lymph nodes. Risks, benefits were reviewed in detail. The guidelines for abnormalities include follow up CT scans at various intervals which could range from 6 weeks to 1 year intervals. If there is a change for the worse then consideration for a biopsy will be considered if you are a candidate. Second opinion evaluation with a thoracic surgeon or an interventional radiologist could be offered.  Problem 1C: s/p Covid-19 Pneumonitis (12/2020) - resolved- Vaccinated x3; 4/2022 -s/p a course of Prednisone; 30 mg for 5 days, then 20 mg for 5 days, then 10 mg for 5 days (completed 1/20/2021) Information sheet given to the patient to be reviewed, this medication is never to be used without consulting the prescribing physician. Proper dietary restraint is necessary specifically salt containing foods, if any reaction may occur should be reported. -s/p monoclonal antibody infusions COVID-19 precautionary Immune Support Recommendations: -OTC Vitamin C 1000mg BID -OTC Quercetin 500mg BID -OTC Zinc 50 mg per day -OTC Melatonin 5 mg a night -OTC Vitamin D 2000mg per day -OTC Tonic Water 8oz per day -Water 0.5-1 gallon per day Additional Support Supplements: -Liposomal Glutathione 500 mg BID -SPM 1 tablet BID -Berberine 1000 mg BID  Problem 1D: Covid 19 4/11/2022- continue quarantine until day 10 (resolved) -day 8- No MAB -No Paxlovid  problem 2: BOOP - confirmed-"active" - move to 15 mg per day for the next 6 weeks -continue Prednisone 20 mg a day - reduce to 15 mg QDay- now 7mg QD -diagnosed by right VATs -s/p up chest CT 10/2022. 12/2022, 6/2023, 1/2024 - next 3/2024 -continue Zithromax 500 mg M/W/F (continue) - Rheumatologic evaluation - Dr. Ceballos al for additional Steroid sparing medications 1/2022  problem 3b: eosinophilic asthma -off Nucala ; Consider Fasenra or Dupixent. Fasenra on 9/17/2020 -Dupixent since 6/2021 -The safety and efficacy of Nucala was established in three double-blind, randomized, placebo-controlled trials in patients with severe asthma. Compared to a placebo, patients with severe asthma receiving Nucala had fewer exacerbation requiring hospitalization and/or emergency department visits, and a longer time to first exacerbation. In addition, patients with severe asthma receiving Nucala experienced greater reductions in their daily maintenance oral corticosteroid dose, while maintaining asthma control compared with patients receiving placebo. Treatment with Nucala did not result in a significant improvement in lung function, as measured by the volume of air exhaled by patients in one second. The most common side effects include: headache, injection site reactions, back pain, weakness, and fatigue; hypersensitivity reactions can occur within hours or days including swelling of the face, mouth, and tongue, fainting, dizziness, hives, breathing problems, and rash; herpes zoster infections have occurred. The drug is a monoclonal antibody that inhibits interleukin -5 which helps regular eosinophils, a type of white blood cell that contributes to asthma. The over-production of eosinophils can cause inflammation in the lungs, increasing the frequency of asthma attacks. Patients must also take other medications, including high dose inhaled corticosteroids and at least one additional asthma drug.  problem 3C: steroid Dependent Asthma - Dupixent since 6/2021 Dupixent is a prescription medicine used with other asthma medicines for the maintenance treatment of moderate-to-severe asthma in people aged 12 years and older whose asthma is not controlled with their current asthma medicines. Dupixent helps prevent severe asthma attacks (exacerbations) and can improve your breathing. Dupixent may also help reduce the amount of oral corticosteroids you need while preventing severe asthma attacks and improving your breathing. Dupixent is not used to treat sudden breathing problems. Risks and side effect of Dupixent were discussed and reviewed with patient.  problem 4: allergic rhinitis -recommended Xlear or nasal saline, Navage. -recommended Sinugator -Environmental measures for allergies were encouraged including mattress and pillow cover, air purifier, and environmental controls.  problem 5: GERD/LPR -continue Pepcid 40 mg QHS - continue Omeprazole 20 mg or 40 mg before breakfast - Recommended Apple cider vineger tablets. -Rule of 2s: avoid eating too much, eating too late, eating too spicy, eating two hours before bed -Things to avoid including overeating, spicy foods, tight clothing, eating within three hours of bed, this list is not all inclusive. -For treatment of reflux, possible options discussed including diet control, H2 blockers, PPIs, as well as coating motility agents discussed as treatment options. Timing of meals and proximity of last meal to sleep were discussed. If symptoms persist, a formal gastrointestinal evaluation is needed.  Problem 5A: Pulmonary Pre-Op Clearance for vocal cord surgery (on hold) -at this point in time there are no absolute pulmonary contraindications to go forward with the planned procedure -at the time of surgery s/he should have optimal pain control, incentive spirometry, early ambulation, DVT and GI prophylaxis.  problem 6: sensory neuropathic cough (controlled) -on Neurontin 100 QSH -off Amitriptyline 10 mg up to TID, QHS -s/p Baclofen 10 mg pre-meal and QHS -continue Tessalon Perles 200 mg qHS -Sensory neuropathic cough is an etiology of cough that is often realized once someone has been ruled out for common disease such as: asthma, COPD, eosinophilic bronchitis, bronchiectasis, post nasal drip, and GERD. It sometimes develops following a URI, herpes zoster outbreak in pharynx or thyroid or cervical spine injury. However, many patients have no identifiable antecedent explanation.  problem 7: r/o diaphragm dysfunction -fluoroscopy of the diaphragm was all normal  problem 8: low immunity -continue to use Gammagard weekly -recommended early intervention -s/p Prevnar 13 vaccine / Pneumovax  problem 9: health maintenance -s/p Covid 19 vaccine x 3 -s/p 2022 flu shot -s/p Shingrix vaccine -recommended strep pneumonia vaccines: Prevnar-13 vaccine, followed by Pneumo vaccine 23 on year following -recommended early intervention for URIs -recommended osteoporosis evaluations -recommended early dermatological evaluations -recommended after the age of 50 to the age of 70, colonoscopy every 5 years  F/U in 3 months with SPI and NiOx She is encouraged to call with any changes, concerns, or questions

## 2024-01-16 NOTE — PROCEDURE
[FreeTextEntry1] : HRCT Chest (1/4/24) revealed in comparison with 7/8/2023, numerous subcentimeter bilateral lung nodules are new from prior exam. Stable bilateral reticular opacities and mild traction bronchiectasis

## 2024-01-16 NOTE — HISTORY OF PRESENT ILLNESS
[Home] : at home, [unfilled] , at the time of the visit. [Medical Office: ([facility])___] : at the medical office located in  [Verbal consent obtained from patient] : the patient, [unfilled] [FreeTextEntry1] : Ms. Kunz is a 58 year old female with a history of abnormal chest CT, severe persistent asthma, BOOP, cough, eosinophilic asthma/PNA, GERD, lung nodules, PND, and SOB who presents into the office via video call for a follow-up pulmonary evaluation. Her chief complaint is   -she notes feeling generally well  -she notes being on Dupixent -she notes occasional cough -she notes taking 7 mg prednisone per day -she notes her bowels are regular  -she notes muscle and finger cramps which she believes is due to her medication -she notes taking Bevespi and Arnuity -she notes nasal congestion for the past few days -she notes taking Atorvastatin 10 mg -she notes taking Montelukast -she notes taking omeprazole and famotidine  -she notes taking elder berries, calcium, quercetin, vitamin C, levothyroxine, and metformin  -patient denies any headaches, nausea, vomiting, fever, chills, sweats, chest pain, chest pressure, palpitations, diarrhea, constipation, dysphagia, myalgias, dizziness, leg swelling, leg pain, itchy eyes, itchy ears, heartburn, reflux or sour taste in the mouth

## 2024-01-19 RX ORDER — PROMETHAZINE HYDROCHLORIDE AND DEXTROMETHORPHAN HYDROBROMIDE ORAL SOLUTION 15; 6.25 MG/5ML; MG/5ML
6.25-15 SOLUTION ORAL
Qty: 473 | Refills: 1 | Status: ACTIVE | COMMUNITY
Start: 2024-01-19 | End: 1900-01-01

## 2024-01-20 ENCOUNTER — NON-APPOINTMENT (OUTPATIENT)
Age: 59
End: 2024-01-20

## 2024-03-06 ENCOUNTER — APPOINTMENT (OUTPATIENT)
Dept: RADIOLOGY | Facility: CLINIC | Age: 59
End: 2024-03-06
Payer: COMMERCIAL

## 2024-03-06 ENCOUNTER — APPOINTMENT (OUTPATIENT)
Dept: CT IMAGING | Facility: CLINIC | Age: 59
End: 2024-03-06
Payer: COMMERCIAL

## 2024-03-06 ENCOUNTER — OUTPATIENT (OUTPATIENT)
Dept: OUTPATIENT SERVICES | Facility: HOSPITAL | Age: 59
LOS: 1 days | End: 2024-03-06
Payer: COMMERCIAL

## 2024-03-06 ENCOUNTER — APPOINTMENT (OUTPATIENT)
Dept: ULTRASOUND IMAGING | Facility: CLINIC | Age: 59
End: 2024-03-06
Payer: COMMERCIAL

## 2024-03-06 DIAGNOSIS — Z98.89 OTHER SPECIFIED POSTPROCEDURAL STATES: Chronic | ICD-10-CM

## 2024-03-06 DIAGNOSIS — Z00.8 ENCOUNTER FOR OTHER GENERAL EXAMINATION: ICD-10-CM

## 2024-03-06 DIAGNOSIS — M71.30 OTHER BURSAL CYST, UNSPECIFIED SITE: Chronic | ICD-10-CM

## 2024-03-06 DIAGNOSIS — Z98.1 ARTHRODESIS STATUS: Chronic | ICD-10-CM

## 2024-03-06 DIAGNOSIS — Z98.82 BREAST IMPLANT STATUS: Chronic | ICD-10-CM

## 2024-03-06 DIAGNOSIS — Z90.10 ACQUIRED ABSENCE OF UNSPECIFIED BREAST AND NIPPLE: Chronic | ICD-10-CM

## 2024-03-06 PROCEDURE — 71250 CT THORAX DX C-: CPT | Mod: 26

## 2024-03-06 PROCEDURE — 73502 X-RAY EXAM HIP UNI 2-3 VIEWS: CPT | Mod: 26,LT

## 2024-03-06 PROCEDURE — 76856 US EXAM PELVIC COMPLETE: CPT | Mod: 26

## 2024-03-06 PROCEDURE — 76830 TRANSVAGINAL US NON-OB: CPT

## 2024-03-06 PROCEDURE — 71250 CT THORAX DX C-: CPT

## 2024-03-06 PROCEDURE — 76856 US EXAM PELVIC COMPLETE: CPT

## 2024-03-06 PROCEDURE — 76830 TRANSVAGINAL US NON-OB: CPT | Mod: 26

## 2024-03-06 PROCEDURE — 73502 X-RAY EXAM HIP UNI 2-3 VIEWS: CPT

## 2024-03-16 ENCOUNTER — OUTPATIENT (OUTPATIENT)
Dept: OUTPATIENT SERVICES | Facility: HOSPITAL | Age: 59
LOS: 1 days | End: 2024-03-16
Payer: COMMERCIAL

## 2024-03-16 ENCOUNTER — APPOINTMENT (OUTPATIENT)
Dept: ULTRASOUND IMAGING | Facility: CLINIC | Age: 59
End: 2024-03-16
Payer: COMMERCIAL

## 2024-03-16 DIAGNOSIS — Z98.89 OTHER SPECIFIED POSTPROCEDURAL STATES: Chronic | ICD-10-CM

## 2024-03-16 DIAGNOSIS — Z98.1 ARTHRODESIS STATUS: Chronic | ICD-10-CM

## 2024-03-16 DIAGNOSIS — Z00.8 ENCOUNTER FOR OTHER GENERAL EXAMINATION: ICD-10-CM

## 2024-03-16 DIAGNOSIS — Z98.82 BREAST IMPLANT STATUS: Chronic | ICD-10-CM

## 2024-03-16 DIAGNOSIS — M71.30 OTHER BURSAL CYST, UNSPECIFIED SITE: Chronic | ICD-10-CM

## 2024-03-16 DIAGNOSIS — Z90.10 ACQUIRED ABSENCE OF UNSPECIFIED BREAST AND NIPPLE: Chronic | ICD-10-CM

## 2024-03-16 PROCEDURE — 76536 US EXAM OF HEAD AND NECK: CPT | Mod: 26

## 2024-03-16 PROCEDURE — 76536 US EXAM OF HEAD AND NECK: CPT

## 2024-03-29 ENCOUNTER — APPOINTMENT (OUTPATIENT)
Dept: PULMONOLOGY | Facility: CLINIC | Age: 59
End: 2024-03-29
Payer: COMMERCIAL

## 2024-03-29 VITALS
HEART RATE: 78 BPM | TEMPERATURE: 98 F | BODY MASS INDEX: 23.39 KG/M2 | OXYGEN SATURATION: 97 % | WEIGHT: 137 LBS | RESPIRATION RATE: 16 BRPM | DIASTOLIC BLOOD PRESSURE: 68 MMHG | HEIGHT: 64 IN | SYSTOLIC BLOOD PRESSURE: 110 MMHG

## 2024-03-29 DIAGNOSIS — K21.9 GASTRO-ESOPHAGEAL REFLUX DISEASE W/OUT ESOPHAGITIS: ICD-10-CM

## 2024-03-29 PROCEDURE — 95012 NITRIC OXIDE EXP GAS DETER: CPT

## 2024-03-29 PROCEDURE — 94010 BREATHING CAPACITY TEST: CPT

## 2024-03-29 PROCEDURE — 99214 OFFICE O/P EST MOD 30 MIN: CPT | Mod: 25

## 2024-03-29 NOTE — PHYSICAL EXAM
[No Acute Distress] : no acute distress [Normal Appearance] : normal appearance [III] : Mallampati Class: III [Normal Oropharynx] : normal oropharynx [Normal Rate/Rhythm] : normal rate/rhythm [No Neck Mass] : no neck mass [No Murmurs] : no murmurs [No Resp Distress] : no resp distress [Normal S1, S2] : normal s1, s2 [Clear to Auscultation Bilaterally] : clear to auscultation bilaterally [Benign] : benign [No Abnormalities] : no abnormalities [No Clubbing] : no clubbing [Normal Gait] : normal gait [No Edema] : no edema [No Cyanosis] : no cyanosis [Normal Color/ Pigmentation] : normal color/ pigmentation [FROM] : FROM [No Focal Deficits] : no focal deficits [Oriented x3] : oriented x3 [Normal Affect] : normal affect [TextBox_68] : I:E ratio 1:3; clear

## 2024-03-29 NOTE — ASSESSMENT
[FreeTextEntry1] : Ms. Kunz is a 58 year old female with a history of chronic sinus disease, GERD, Hashimoto's thyroiditis, BOOP, IgG deficiency, recent iron deficient anemia, asthma (eosinophilic) coming in to the office for follow-up pulmonary evaluation- s/p mastectomy and s/p hospitalization for "expander" Staph infection. recovered and s/p Taxol, Herceptin and Perjetta RT for her breast cancer. s/p radiation therapy 7/2018 s/p COVID-19 pneumonitis with residual parenchymal changes 12/2020 - (+)Active BOOP- recontrolled 11/16/ VATS - s/p prednisone 3 mg QDay / covid 19 4/2022 (resolveD) - s/p progressive CT c/w worsening BOOP/ -on prednisone 7mg, s/p VC polyp surgery 9/28/2022 - spindle cell CA - stable, on Dupixent - but progressive CT 1/204 - improved; thriving on Mounjaro  problem 1a: original abnormal chest CT s/p VATS - c/w BOOP - (active) 10/2021 -BOOP, diagnosed by VATS in 2013 / 2019; 2021 -s/p Prednisone at 7mg/day of prednisone - boost to 15 mg/day for next 6 weeks-> drop to 15mg EVEN, 10mg ODD x3 weeks, 10mg/day x2 months-check in with me -refused 2nd opinion ([facility]) Imuran etc. - Information sheet given to the patient to be reviewed, this medication is never to be used without consulting the prescribing physician. Proper dietary restraint is necessary specifically salt containing foods, if any reaction may occur should be reported. 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 -s/p CT (2/2021,6/2021, 10/2021)- 7/2022,10/2022,12/2022, 7/2023, 1/2024 - 3/2024- next 3/3025  problem 1B: (+) Abnormal CT (nodules) - enlarged RLL- ?EDWAR - steroid sparing regimen - Inflammatory disease c/w BOOP c/w /BOOP - Zithromax 500 mg (M,W,F) 10.2022, pred 7/mg qD - move to 15 mg per day for the next 6 weeks- drop to 15mg EVEN, 10mg ODD x3 weeks, 10mg/day x2 months-check in with me - refused Imuran 50mg qday -2nd opinion Joceline - f/p with LFTs - Cytoxan in reserve if worsens -s/p Sputum Screening for AFB x 3 - f/p CT 7/2023 - 1/2024- 3/2024- next 3/2025 - Imuran coupled with low dose prednisone and restarted Zithromax macrolide abx 7/2022 (Imuran on hold) -CAT scans are the only radiological modality to identify abnormalities w/in the lungs with regards to nodules/masses/lymph nodes. Risks, benefits were reviewed in detail. The guidelines for abnormalities include follow up CT scans at various intervals which could range from 6 weeks to 1 year intervals. If there is a change for the worse then consideration for a biopsy will be considered if you are a candidate. Second opinion evaluation with a thoracic surgeon or an interventional radiologist could be offered.  Problem 1C: s/p Covid-19 Pneumonitis (12/2020) - resolved- Vaccinated x3; 4/2022 -s/p a course of Prednisone; 30 mg for 5 days, then 20 mg for 5 days, then 10 mg for 5 days (completed 1/20/2021) Information sheet given to the patient to be reviewed, this medication is never to be used without consulting the prescribing physician. Proper dietary restraint is necessary specifically salt containing foods, if any reaction may occur should be reported. -s/p monoclonal antibody infusions COVID-19 precautionary Immune Support Recommendations: -OTC Vitamin C 1000mg BID -OTC Quercetin 500mg BID -OTC Zinc 50 mg per day -OTC Melatonin 5 mg a night -OTC Vitamin D 2000mg per day -OTC Tonic Water 8oz per day -Water 0.5-1 gallon per day Additional Support Supplements: -Liposomal Glutathione 500 mg BID -SPM 1 tablet BID -Berberine 1000 mg BID  Problem 1D: Covid 19 4/11/2022- continue quarantine until day 10 (resolved) -day 8- No MAB -No Paxlovid  problem 2: BOOP - confirmed-"active" - drop to 15mg EVEN, 10mg ODD x3 weeks, 10mg/day x2 months (3/2024)-check in with me -diagnosed by right VATs -s/p up chest CT 10/2022. 12/2022, 6/2023, 1/2024 - 3/2024- next 3/2025 -s/p Zithromax 500 mg M/W/F -Rheumatologic evaluation - Dr. Benjamin et al for additional Steroid sparing medications 1/2022  problem 3b: eosinophilic asthma, steroid-dependent asthma -off Nucala ; Consider Fasenra or Dupixent. Fasenra on 9/17/2020 -Dupixent since 6/2021 -she is experiencing fewer exacerbations and better Sx control on Dupixent therapy  -The safety and efficacy of Nucala was established in three double-blind, randomized, placebo-controlled trials in patients with severe asthma. Compared to a placebo, patients with severe asthma receiving Nucala had fewer exacerbation requiring hospitalization and/or emergency department visits, and a longer time to first exacerbation. In addition, patients with severe asthma receiving Nucala experienced greater reductions in their daily maintenance oral corticosteroid dose, while maintaining asthma control compared with patients receiving placebo. Treatment with Nucala did not result in a significant improvement in lung function, as measured by the volume of air exhaled by patients in one second. The most common side effects include: headache, injection site reactions, back pain, weakness, and fatigue; hypersensitivity reactions can occur within hours or days including swelling of the face, mouth, and tongue, fainting, dizziness, hives, breathing problems, and rash; herpes zoster infections have occurred. The drug is a monoclonal antibody that inhibits interleukin -5 which helps regular eosinophils, a type of white blood cell that contributes to asthma. The over-production of eosinophils can cause inflammation in the lungs, increasing the frequency of asthma attacks. Patients must also take other medications, including high dose inhaled corticosteroids and at least one additional asthma drug.  problem 4: allergic rhinitis -recommended Xlear or nasal saline, Navage. -recommended Sinugator -Environmental measures for allergies were encouraged including mattress and pillow cover, air purifier, and environmental controls.  problem 5: GERD/LPR -continue Pepcid 40 mg QHS - continue Omeprazole 20 mg or 40 mg before breakfast - Recommended Apple cider vineger tablets. -Rule of 2s: avoid eating too much, eating too late, eating too spicy, eating two hours before bed -Things to avoid including overeating, spicy foods, tight clothing, eating within three hours of bed, this list is not all inclusive. -For treatment of reflux, possible options discussed including diet control, H2 blockers, PPIs, as well as coating motility agents discussed as treatment options. Timing of meals and proximity of last meal to sleep were discussed. If symptoms persist, a formal gastrointestinal evaluation is needed.  problem 6: sensory neuropathic cough (controlled) -on Neurontin 100 QSH -off Amitriptyline 10 mg up to TID, QHS -s/p Baclofen 10 mg pre-meal and QHS -continue Tessalon Perles 200 mg qHS -Sensory neuropathic cough is an etiology of cough that is often realized once someone has been ruled out for common disease such as: asthma, COPD, eosinophilic bronchitis, bronchiectasis, post nasal drip, and GERD. It sometimes develops following a URI, herpes zoster outbreak in pharynx or thyroid or cervical spine injury. However, many patients have no identifiable antecedent explanation.  problem 7: r/o diaphragm dysfunction -fluoroscopy of the diaphragm was all normal  problem 8: low immunity -continue to use Gammagard weekly -recommended early intervention -s/p Prevnar 13 vaccine / Pneumovax  Problem 8A: Overweight/out of shape- resolved 3/2024 -Mounjaro in place since 2023 -Weight loss, exercise, and diet control were discussed and are highly encouraged. Treatment options were given such as, aqua therapy, and contacting a nutritionist. Recommended to use the elliptical, stationary bike, less use of treadmill. Mindful eating was explained to the patient. Obesity is associated with worsening asthma, shortness of breath, and potential for cardiac disease, diabetes and other underlying medical conditions.   problem 9: health maintenance -recommended MyoCalm PM for ankle cramps -s/p Covid 19 vaccine x 3 -s/p 2023 flu shot -s/p Shingrix vaccine -recommended strep pneumonia vaccines: Prevnar-13 vaccine, followed by Pneumo vaccine 23 on year following -recommended early intervention for URIs -recommended osteoporosis evaluations -recommended early dermatological evaluations -recommended after the age of 50 to the age of 70, colonoscopy every 5 years  F/P in 3 months with SPI and NiOx She is encouraged to call with any changes, concerns, or questions

## 2024-03-29 NOTE — ADDENDUM
[FreeTextEntry1] : Documented by Iman Dobbins acting as a scribe for Dr. Munir Malin on 03/29/2024. All medical record entries made by the Scribe were at my, Dr. Munir Malin's, direction and personally dictated by me on 03/29/2024. I have reviewed the chart and agree that the record accurately reflects my personal performance of the history, physical exam, assessment and plan. I have also personally directed, reviewed, and agree with the discharge instructions.

## 2024-03-29 NOTE — HISTORY OF PRESENT ILLNESS
[FreeTextEntry1] : Ms. Kunz is a 58 year old female with a history of abnormal chest CT, severe persistent asthma, BOOP, cough, eosinophilic asthma/PNA, GERD, lung nodules, PND, and SOB who presents to the office for a follow-up pulmonary evaluation. Her chief complaint is   -she notes feeling generally well  -she denies exercising  -she notes she's losing weight on Mounjaro, and she's no longer overweight. She started 9/2023 -she notes bowels are regular  -she notes GERD is quiet now -she denies any back pain or neck pain  -she notes intermittent severe ankle cramps, if she moves the wrong way or is sitting in dorsiflexion for prolonged periods. Walking with her ankles extended improves her Sx -she notes good quality of sleep. She sometimes takes CBD gummies to help her fall asleep -she notes she's on 15 mg prednisone/day -she notes s/p Carotid evaluation, and results are pending  -she denies any headaches, nausea, emesis, fever, chills, sweats, chest pain, chest pressure, coughing, wheezing, palpitations, diarrhea, constipation, dysphagia, vertigo, leg swelling, itchy eyes, itchy ears, or sour taste in the mouth.

## 2024-03-29 NOTE — PROCEDURE
[FreeTextEntry1] : PFTs revealed mild restrictive and obstructive dysfunction; FEV1 was 1.73L, which is 66% of predicted; normal flow volume loop. PFTs were performed to evaluate for asthma, BOOP  FENO was 18; a normal value being less than 25 Fractional exhaled nitric oxide (FENO) is regarded as a simple, noninvasive method for assessing eosinophilic airway inflammation. Produced by a variety of cells within the lung, nitric oxide (NO) concentrations are generally low in healthy individuals. However, high concentrations of NO appear to be involved in nonspecific host defense mechanisms and chronic inflammatory diseases such as asthma. The American Thoracic Society (ATS) therefore has recommended using FENO to aid in the diagnosis and monitoring of eosinophilic airway inflammation and asthma, and for identifying steroid responsive individuals whose chronic respiratory symptoms may be caused by airway inflammation.

## 2024-04-08 ENCOUNTER — APPOINTMENT (OUTPATIENT)
Dept: PULMONOLOGY | Facility: CLINIC | Age: 59
End: 2024-04-08
Payer: COMMERCIAL

## 2024-04-08 DIAGNOSIS — J84.89 OTHER SPECIFIED INTERSTITIAL PULMONARY DISEASES: ICD-10-CM

## 2024-04-08 DIAGNOSIS — K21.9 GASTRO-ESOPHAGEAL REFLUX DISEASE W/OUT ESOPHAGITIS: ICD-10-CM

## 2024-04-08 DIAGNOSIS — E66.9 OBESITY, UNSPECIFIED: ICD-10-CM

## 2024-04-08 DIAGNOSIS — R41.3 OTHER AMNESIA: ICD-10-CM

## 2024-04-08 DIAGNOSIS — J45.50 SEVERE PERSISTENT ASTHMA, UNCOMPLICATED: ICD-10-CM

## 2024-04-08 DIAGNOSIS — R06.02 SHORTNESS OF BREATH: ICD-10-CM

## 2024-04-08 DIAGNOSIS — R93.89 ABNORMAL FINDINGS ON DIAGNOSTIC IMAGING OF OTHER SPECIFIED BODY STRUCTURES: ICD-10-CM

## 2024-04-08 DIAGNOSIS — J82.81 CHRONIC EOSINOPHILIC PNEUMONIA: ICD-10-CM

## 2024-04-08 DIAGNOSIS — J45.909 UNSPECIFIED ASTHMA, UNCOMPLICATED: ICD-10-CM

## 2024-04-08 DIAGNOSIS — J82.83 EOSINOPHILIC ASTHMA: ICD-10-CM

## 2024-04-08 PROCEDURE — 99214 OFFICE O/P EST MOD 30 MIN: CPT

## 2024-04-08 NOTE — ADDENDUM
[FreeTextEntry1] : Documented by Thor Meraz acting as a scribe for Dr. Munir Malin on 04/08/2024.   All medical record entries made by the Scribe were at my, Dr. Munir Malin's, direction and personally dictated by me on 04/08/2024. I have reviewed the chart and agree that the record accurately reflects my personal performance of the history, physical exam, assessment and plan. I have also personally directed, reviewed, and agree with the discharge instructions.

## 2024-04-08 NOTE — REASON FOR VISIT
[Follow-Up] : a follow-up visit [FreeTextEntry1] : via video call: s/p COVID-19 infection 12/2020, abnormal chest CT, severe persistent asthma, BOOP, cough, eosinophilic asthma/PNA, GERD, lung nodules, PND, and SOB

## 2024-04-08 NOTE — ASSESSMENT
[FreeTextEntry1] : Ms. Kunz is a 58 year old female with a history of chronic sinus disease, GERD, Hashimoto's thyroiditis, BOOP, IgG deficiency, recent iron deficient anemia, asthma (eosinophilic) coming in to the office via video call for follow-up pulmonary evaluation- s/p mastectomy and s/p hospitalization for "expander" Staph infection. recovered and s/p Taxol, Herceptin and Perjetta RT for her breast cancer. 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/ VATS - s/p prednisone 3 mg Qday / covid 19 4/2022 (resolved) - s/p progressive CT c/w worsening BOOP/ -on prednisone 7mg, s/p VC polyp surgery 9/28/2022 - spindle cell CA - stable, on Dupixent - but progressive CT 1/204 - improved; thriving on Mounjaro but ?Memory issues  problem 1a: abnormal chest CT s/p VATS - c/w BOOP - (active) 10/2021 -BOOP, diagnosed by VATS in 2013 / 2019; 2021 -s/p Prednisone at 7mg/day of prednisone - boost to 15 mg/day for next 6 weeks-> drop to 15mg EVEN, 10mg ODD x3 weeks, 10mg/day x2 months-check in with me -refused 2nd opinion ([facility]) Imuran etc. - Information sheet given to the patient to be reviewed, this medication is never to be used without consulting the prescribing physician. Proper dietary restraint is necessary specifically salt containing foods, if any reaction may occur should be reported. 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 -s/p CT (2/2021,6/2021, 10/2021)- 7/2022,10/2022,12/2022, 7/2023, 1/2024 - 3/2024- next 3/3025  problem 1B: (+) Abnormal CT (nodules) - enlarged RLL- ?EDWAR - steroid sparing regimen - Inflammatory disease c/w BOOP c/w /BOOP - Zithromax 500 mg (M,W,F) 10.2022, pred 7/mg qD - move to 15 mg per day for the next 6 weeks- drop to 15mg EVEN, 10mg ODD x3 weeks, 10mg/day x2 months-check in with me - refused Imuran 50mg qday -2nd opinion Joceline - f/p with LFTs - Cytoxan in reserve if worsens -s/p Sputum Screening for AFB x 3 - f/p CT 7/2023 - 1/2024- 3/2024- next 3/2025 - Imuran coupled with low dose prednisone and restarted Zithromax macrolide abx 7/2022 (Imuran on hold) -CAT scans are the only radiological modality to identify abnormalities w/in the lungs with regards to nodules/masses/lymph nodes. Risks, benefits were reviewed in detail. The guidelines for abnormalities include follow up CT scans at various intervals which could range from 6 weeks to 1 year intervals. If there is a change for the worse then consideration for a biopsy will be considered if you are a candidate. Second opinion evaluation with a thoracic surgeon or an interventional radiologist could be offered.  Problem 1C: s/p Covid-19 Pneumonitis (12/2020) - resolved- Vaccinated x3; 4/2022 (resolved) -s/p a course of Prednisone; 30 mg for 5 days, then 20 mg for 5 days, then 10 mg for 5 days (completed 1/20/2021) Information sheet given to the patient to be reviewed, this medication is never to be used without consulting the prescribing physician. Proper dietary restraint is necessary specifically salt containing foods, if any reaction may occur should be reported. -s/p monoclonal antibody infusions COVID-19 precautionary Immune Support Recommendations: -OTC Vitamin C 1000mg BID -OTC Quercetin 500mg BID -OTC Zinc 50 mg per day -OTC Melatonin 5 mg a night -OTC Vitamin D 2000mg per day -OTC Tonic Water 8oz per day -Water 0.5-1 gallon per day Additional Support Supplements: -Liposomal Glutathione 500 mg BID -SPM 1 tablet BID -Berberine 1000 mg BID  Problem 1D: Covid 19 4/11/2022- continue quarantine until day 10 (resolved) -day 8- No MAB -No Paxlovid  problem 2: BOOP - confirmed-"active" - drop to 15mg EVEN, 10mg ODD x3 weeks, 10mg/day x2 months (3/2024)-check in with me -diagnosed by right VATs -s/p up chest CT 10/2022. 12/2022, 6/2023, 1/2024 - 3/2024- next 3/2025 -s/p Zithromax 500 mg M/W/F -Rheumatologic evaluation - Dr. Benjamin et al for additional Steroid sparing medications 1/2022  problem 3: eosinophilic asthma, steroid-dependent asthma -off Nucala ; Consider Fasenra or Dupixent. Fasenra on 9/17/2020 -Dupixent since 6/2021 -she is experiencing fewer exacerbations and better Sx control on Dupixent therapy  -The safety and efficacy of Nucala was established in three double-blind, randomized, placebo-controlled trials in patients with severe asthma. Compared to a placebo, patients with severe asthma receiving Nucala had fewer exacerbation requiring hospitalization and/or emergency department visits, and a longer time to first exacerbation. In addition, patients with severe asthma receiving Nucala experienced greater reductions in their daily maintenance oral corticosteroid dose, while maintaining asthma control compared with patients receiving placebo. Treatment with Nucala did not result in a significant improvement in lung function, as measured by the volume of air exhaled by patients in one second. The most common side effects include: headache, injection site reactions, back pain, weakness, and fatigue; hypersensitivity reactions can occur within hours or days including swelling of the face, mouth, and tongue, fainting, dizziness, hives, breathing problems, and rash; herpes zoster infections have occurred. The drug is a monoclonal antibody that inhibits interleukin -5 which helps regular eosinophils, a type of white blood cell that contributes to asthma. The over-production of eosinophils can cause inflammation in the lungs, increasing the frequency of asthma attacks. Patients must also take other medications, including high dose inhaled corticosteroids and at least one additional asthma drug.  problem 4: allergic rhinitis -recommended Xlear or nasal saline, Navage. -recommended Sinugator -Environmental measures for allergies were encouraged including mattress and pillow cover, air purifier, and environmental controls.  problem 5: GERD/LPR -continue Pepcid 40 mg QHS - continue Omeprazole 20 mg or 40 mg before breakfast - Recommended Apple cider vineger tablets. -Rule of 2s: avoid eating too much, eating too late, eating too spicy, eating two hours before bed -Things to avoid including overeating, spicy foods, tight clothing, eating within three hours of bed, this list is not all inclusive. -For treatment of reflux, possible options discussed including diet control, H2 blockers, PPIs, as well as coating motility agents discussed as treatment options. Timing of meals and proximity of last meal to sleep were discussed. If symptoms persist, a formal gastrointestinal evaluation is needed.  problem 6: sensory neuropathic cough (controlled) -on Neurontin 100 QSH -off Amitriptyline 10 mg up to TID, QHS -s/p Baclofen 10 mg pre-meal and QHS -continue Tessalon Perles 200 mg qHS -Sensory neuropathic cough is an etiology of cough that is often realized once someone has been ruled out for common disease such as: asthma, COPD, eosinophilic bronchitis, bronchiectasis, post nasal drip, and GERD. It sometimes develops following a URI, herpes zoster outbreak in pharynx or thyroid or cervical spine injury. However, many patients have no identifiable antecedent explanation.  problem 7: r/o diaphragm dysfunction -fluoroscopy of the diaphragm was all normal  problem 8: low immunity -continue to use Gammagard weekly -recommended early intervention -s/p Prevnar 13 vaccine / Pneumovax  Problem 9: Overweight/out of shape- resolved 3/2024 -Mounjaro in place since 2023 -Weight loss, exercise, and diet control were discussed and are highly encouraged. Treatment options were given such as, aqua therapy, and contacting a nutritionist. Recommended to use the elliptical, stationary bike, less use of treadmill. Mindful eating was explained to the patient. Obesity is associated with worsening asthma, shortness of breath, and potential for cardiac disease, diabetes and other underlying medical conditions.   problem 10: ?Memory Issues -continue f/p with Dr. Stewart -get home sleep study (4/2024)  problem 11: health maintenance -recommended MyoCalm PM for ankle cramps -s/p Covid 19 vaccine x 3 -s/p 2023 flu shot -s/p Shingrix vaccine -recommended strep pneumonia vaccines: Prevnar-13 vaccine, followed by Pneumo vaccine 23 on year following -recommended early intervention for URIs -recommended osteoporosis evaluations -recommended early dermatological evaluations -recommended after the age of 50 to the age of 70, colonoscopy every 5 years  F/P in 3 months with SPI and NiOx She is encouraged to call with any changes, concerns, or questions

## 2024-04-08 NOTE — HISTORY OF PRESENT ILLNESS
[Home] : at home, [unfilled] , at the time of the visit. [Medical Office: ([facility])___] : at the medical office located in  [Verbal consent obtained from patient] : the patient, [unfilled] [FreeTextEntry1] : Ms. Kunz is a 58 year old female with a history of abnormal chest CT, severe persistent asthma, BOOP, cough, eosinophilic asthma/PNA, GERD, lung nodules, PND, and SOB who presents to the office via video call for a follow-up pulmonary evaluation. Her chief complaint is   - she notes seeing Dr. Stewart who wants her to get a sleep study - she notes not sleeping much getting about 5-6 hours  - she notes she could fall asleep while watching a boring TV show - she notes daytime fatigue - she denies snoring -  she notes having no trouble getting to sleep - she notes not napping purposefully during the day - she notes her speech abnormalities have been present since she started chemo  -she denies any headaches, nausea, emesis, fever, chills, sweats, chest pain, chest pressure, coughing, wheezing, palpitations, constipation, diarrhea, vertigo, dysphagia, heartburn, reflux, itchy eyes, itchy ears, leg swelling, leg pain, arthralgias, myalgias, hoarseness, or sour taste in the mouth.

## 2024-04-11 RX ORDER — DUPILUMAB 300 MG/2ML
300 INJECTION, SOLUTION SUBCUTANEOUS
Qty: 4 | Refills: 1 | Status: ACTIVE | COMMUNITY
Start: 2021-06-18 | End: 1900-01-01

## 2024-04-11 RX ORDER — DUPILUMAB 300 MG/2ML
300 INJECTION, SOLUTION SUBCUTANEOUS
Qty: 6 | Refills: 3 | Status: ACTIVE | COMMUNITY
Start: 2022-08-12 | End: 1900-01-01

## 2024-04-14 ENCOUNTER — RX RENEWAL (OUTPATIENT)
Age: 59
End: 2024-04-14

## 2024-04-14 RX ORDER — OMEPRAZOLE 40 MG/1
40 CAPSULE, DELAYED RELEASE ORAL
Qty: 90 | Refills: 1 | Status: ACTIVE | COMMUNITY
Start: 2020-04-02 | End: 1900-01-01

## 2024-04-29 ENCOUNTER — APPOINTMENT (OUTPATIENT)
Dept: SLEEP CENTER | Facility: CLINIC | Age: 59
End: 2024-04-29
Payer: COMMERCIAL

## 2024-04-29 ENCOUNTER — OUTPATIENT (OUTPATIENT)
Dept: OUTPATIENT SERVICES | Facility: HOSPITAL | Age: 59
LOS: 1 days | End: 2024-04-29
Payer: COMMERCIAL

## 2024-04-29 DIAGNOSIS — Z98.82 BREAST IMPLANT STATUS: Chronic | ICD-10-CM

## 2024-04-29 DIAGNOSIS — Z98.1 ARTHRODESIS STATUS: Chronic | ICD-10-CM

## 2024-04-29 DIAGNOSIS — Z98.89 OTHER SPECIFIED POSTPROCEDURAL STATES: Chronic | ICD-10-CM

## 2024-04-29 DIAGNOSIS — M71.30 OTHER BURSAL CYST, UNSPECIFIED SITE: Chronic | ICD-10-CM

## 2024-04-29 DIAGNOSIS — Z90.10 ACQUIRED ABSENCE OF UNSPECIFIED BREAST AND NIPPLE: Chronic | ICD-10-CM

## 2024-04-29 PROCEDURE — 95800 SLP STDY UNATTENDED: CPT

## 2024-04-29 PROCEDURE — 95800 SLP STDY UNATTENDED: CPT | Mod: 26

## 2024-05-06 DIAGNOSIS — G47.33 OBSTRUCTIVE SLEEP APNEA (ADULT) (PEDIATRIC): ICD-10-CM

## 2024-05-28 ENCOUNTER — RX RENEWAL (OUTPATIENT)
Age: 59
End: 2024-05-28

## 2024-05-28 RX ORDER — AZITHROMYCIN 500 MG/1
500 TABLET, FILM COATED ORAL
Qty: 36 | Refills: 1 | Status: ACTIVE | COMMUNITY
Start: 2019-12-10 | End: 1900-01-01

## 2024-05-28 RX ORDER — AZITHROMYCIN 500 MG/1
500 TABLET, FILM COATED ORAL
Qty: 36 | Refills: 1 | Status: ACTIVE | COMMUNITY
Start: 2019-12-24 | End: 1900-01-01

## 2024-06-05 ENCOUNTER — RX RENEWAL (OUTPATIENT)
Age: 59
End: 2024-06-05

## 2024-06-05 RX ORDER — MONTELUKAST 10 MG/1
10 TABLET, FILM COATED ORAL
Qty: 90 | Refills: 1 | Status: ACTIVE | COMMUNITY
Start: 2019-12-10 | End: 1900-01-01

## 2024-06-14 ENCOUNTER — RX RENEWAL (OUTPATIENT)
Age: 59
End: 2024-06-14

## 2024-06-14 RX ORDER — FLUTICASONE PROPIONATE 50 UG/1
50 SPRAY, METERED NASAL
Qty: 48 | Refills: 1 | Status: ACTIVE | COMMUNITY
Start: 2018-07-11 | End: 1900-01-01

## 2024-07-02 ENCOUNTER — RX RENEWAL (OUTPATIENT)
Age: 59
End: 2024-07-02

## 2024-07-09 ENCOUNTER — APPOINTMENT (OUTPATIENT)
Dept: PULMONOLOGY | Facility: CLINIC | Age: 59
End: 2024-07-09
Payer: COMMERCIAL

## 2024-07-09 VITALS
WEIGHT: 139 LBS | SYSTOLIC BLOOD PRESSURE: 110 MMHG | HEART RATE: 83 BPM | HEIGHT: 64 IN | RESPIRATION RATE: 16 BRPM | BODY MASS INDEX: 23.73 KG/M2 | DIASTOLIC BLOOD PRESSURE: 70 MMHG | TEMPERATURE: 96.5 F | OXYGEN SATURATION: 94 %

## 2024-07-09 DIAGNOSIS — R93.89 ABNORMAL FINDINGS ON DIAGNOSTIC IMAGING OF OTHER SPECIFIED BODY STRUCTURES: ICD-10-CM

## 2024-07-09 DIAGNOSIS — E66.9 OBESITY, UNSPECIFIED: ICD-10-CM

## 2024-07-09 DIAGNOSIS — K21.9 GASTRO-ESOPHAGEAL REFLUX DISEASE W/OUT ESOPHAGITIS: ICD-10-CM

## 2024-07-09 DIAGNOSIS — J82.81 CHRONIC EOSINOPHILIC PNEUMONIA: ICD-10-CM

## 2024-07-09 DIAGNOSIS — J82.83 EOSINOPHILIC ASTHMA: ICD-10-CM

## 2024-07-09 DIAGNOSIS — R06.02 SHORTNESS OF BREATH: ICD-10-CM

## 2024-07-09 DIAGNOSIS — J45.50 SEVERE PERSISTENT ASTHMA, UNCOMPLICATED: ICD-10-CM

## 2024-07-09 DIAGNOSIS — J84.89 OTHER SPECIFIED INTERSTITIAL PULMONARY DISEASES: ICD-10-CM

## 2024-07-09 PROCEDURE — 94729 DIFFUSING CAPACITY: CPT

## 2024-07-09 PROCEDURE — 94727 GAS DIL/WSHOT DETER LNG VOL: CPT

## 2024-07-09 PROCEDURE — 94010 BREATHING CAPACITY TEST: CPT

## 2024-07-09 PROCEDURE — 99214 OFFICE O/P EST MOD 30 MIN: CPT | Mod: 25

## 2024-07-09 PROCEDURE — ZZZZZ: CPT

## 2024-07-09 PROCEDURE — 95012 NITRIC OXIDE EXP GAS DETER: CPT

## 2024-07-09 RX ORDER — PREDNISONE 1 MG/1
1 TABLET ORAL
Qty: 200 | Refills: 2 | Status: ACTIVE | COMMUNITY
Start: 2024-07-09 | End: 1900-01-01

## 2024-07-26 ENCOUNTER — RX RENEWAL (OUTPATIENT)
Age: 59
End: 2024-07-26

## 2024-07-26 RX ORDER — EPINEPHRINE 0.3 MG/.3ML
0.3 INJECTION INTRAMUSCULAR
Qty: 1 | Refills: 1 | Status: ACTIVE | COMMUNITY
Start: 2024-07-26 | End: 1900-01-01

## 2024-10-10 ENCOUNTER — APPOINTMENT (OUTPATIENT)
Dept: PULMONOLOGY | Facility: CLINIC | Age: 59
End: 2024-10-10
Payer: COMMERCIAL

## 2024-10-10 VITALS
WEIGHT: 135 LBS | SYSTOLIC BLOOD PRESSURE: 110 MMHG | HEART RATE: 95 BPM | BODY MASS INDEX: 23.05 KG/M2 | HEIGHT: 64 IN | DIASTOLIC BLOOD PRESSURE: 66 MMHG | OXYGEN SATURATION: 94 % | TEMPERATURE: 97.1 F | RESPIRATION RATE: 16 BRPM

## 2024-10-10 DIAGNOSIS — K21.9 GASTRO-ESOPHAGEAL REFLUX DISEASE W/OUT ESOPHAGITIS: ICD-10-CM

## 2024-10-10 DIAGNOSIS — J45.909 UNSPECIFIED ASTHMA, UNCOMPLICATED: ICD-10-CM

## 2024-10-10 DIAGNOSIS — R06.02 SHORTNESS OF BREATH: ICD-10-CM

## 2024-10-10 DIAGNOSIS — J45.50 SEVERE PERSISTENT ASTHMA, UNCOMPLICATED: ICD-10-CM

## 2024-10-10 DIAGNOSIS — J84.89 OTHER SPECIFIED INTERSTITIAL PULMONARY DISEASES: ICD-10-CM

## 2024-10-10 DIAGNOSIS — E66.9 OBESITY, UNSPECIFIED: ICD-10-CM

## 2024-10-10 DIAGNOSIS — R93.89 ABNORMAL FINDINGS ON DIAGNOSTIC IMAGING OF OTHER SPECIFIED BODY STRUCTURES: ICD-10-CM

## 2024-10-10 PROCEDURE — 94010 BREATHING CAPACITY TEST: CPT

## 2024-10-10 PROCEDURE — 94727 GAS DIL/WSHOT DETER LNG VOL: CPT

## 2024-10-10 PROCEDURE — 94729 DIFFUSING CAPACITY: CPT

## 2024-10-10 PROCEDURE — 95012 NITRIC OXIDE EXP GAS DETER: CPT

## 2024-10-10 PROCEDURE — ZZZZZ: CPT

## 2024-10-10 PROCEDURE — 99214 OFFICE O/P EST MOD 30 MIN: CPT | Mod: 25

## 2024-10-14 ENCOUNTER — RX RENEWAL (OUTPATIENT)
Age: 59
End: 2024-10-14

## 2024-10-22 NOTE — H&P PST ADULT - NS NEC GEN PE MLT EXAM PC
"The patient's goals for the shift include \"to leave the ICU\"    The clinical goals for the shift include wean O2    Over the shift, the patient did not make progress toward the following goals. Barriers to progression include none. Recommendations to address these barriers include cont POX to monitor O2 saturation.    " detailed exam

## 2024-11-06 ENCOUNTER — APPOINTMENT (OUTPATIENT)
Dept: OBGYN | Facility: CLINIC | Age: 59
End: 2024-11-06
Payer: COMMERCIAL

## 2024-11-06 VITALS
DIASTOLIC BLOOD PRESSURE: 60 MMHG | TEMPERATURE: 97.2 F | BODY MASS INDEX: 22.88 KG/M2 | SYSTOLIC BLOOD PRESSURE: 108 MMHG | HEIGHT: 64 IN | WEIGHT: 134 LBS

## 2024-11-06 DIAGNOSIS — M81.0 AGE-RELATED OSTEOPOROSIS W/OUT CURRENT PATHOLOGICAL FRACTURE: ICD-10-CM

## 2024-11-06 DIAGNOSIS — Z15.02 GENETIC SUSCEPTIBILITY TO MALIGNANT NEOPLASM OF OVARY: ICD-10-CM

## 2024-11-06 DIAGNOSIS — C50.912 MALIGNANT NEOPLASM OF UNSPECIFIED SITE OF LEFT FEMALE BREAST: ICD-10-CM

## 2024-11-06 DIAGNOSIS — Z01.419 ENCOUNTER FOR GYNECOLOGICAL EXAMINATION (GENERAL) (ROUTINE) W/OUT ABNORMAL FINDINGS: ICD-10-CM

## 2024-11-06 PROCEDURE — 99396 PREV VISIT EST AGE 40-64: CPT

## 2024-11-07 LAB — HPV HIGH+LOW RISK DNA PNL CVX: NOT DETECTED

## 2024-11-11 LAB — CYTOLOGY CVX/VAG DOC THIN PREP: ABNORMAL

## 2024-11-19 ENCOUNTER — APPOINTMENT (OUTPATIENT)
Facility: CLINIC | Age: 59
End: 2024-11-19
Payer: COMMERCIAL

## 2024-11-19 PROCEDURE — 99204 OFFICE O/P NEW MOD 45 MIN: CPT | Mod: 25

## 2024-11-19 PROCEDURE — 31575 DIAGNOSTIC LARYNGOSCOPY: CPT

## 2024-12-11 ENCOUNTER — RX RENEWAL (OUTPATIENT)
Age: 59
End: 2024-12-11

## 2025-01-08 ENCOUNTER — RX RENEWAL (OUTPATIENT)
Age: 60
End: 2025-01-08

## 2025-01-14 NOTE — PATIENT PROFILE ADULT - ABILITY TO HEAR (WITH HEARING AID OR HEARING APPLIANCE IF NORMALLY USED):

## 2025-02-27 ENCOUNTER — NON-APPOINTMENT (OUTPATIENT)
Age: 60
End: 2025-02-27

## 2025-02-27 ENCOUNTER — APPOINTMENT (OUTPATIENT)
Dept: PULMONOLOGY | Facility: CLINIC | Age: 60
End: 2025-02-27
Payer: COMMERCIAL

## 2025-02-27 VITALS
HEART RATE: 77 BPM | HEIGHT: 64 IN | RESPIRATION RATE: 16 BRPM | WEIGHT: 132 LBS | OXYGEN SATURATION: 96 % | DIASTOLIC BLOOD PRESSURE: 60 MMHG | TEMPERATURE: 97.1 F | BODY MASS INDEX: 22.53 KG/M2 | SYSTOLIC BLOOD PRESSURE: 102 MMHG

## 2025-02-27 DIAGNOSIS — J82.81 CHRONIC EOSINOPHILIC PNEUMONIA: ICD-10-CM

## 2025-02-27 DIAGNOSIS — R93.89 ABNORMAL FINDINGS ON DIAGNOSTIC IMAGING OF OTHER SPECIFIED BODY STRUCTURES: ICD-10-CM

## 2025-02-27 DIAGNOSIS — K21.9 GASTRO-ESOPHAGEAL REFLUX DISEASE W/OUT ESOPHAGITIS: ICD-10-CM

## 2025-02-27 DIAGNOSIS — J45.909 UNSPECIFIED ASTHMA, UNCOMPLICATED: ICD-10-CM

## 2025-02-27 DIAGNOSIS — R06.02 SHORTNESS OF BREATH: ICD-10-CM

## 2025-02-27 DIAGNOSIS — J45.50 SEVERE PERSISTENT ASTHMA, UNCOMPLICATED: ICD-10-CM

## 2025-02-27 DIAGNOSIS — J82.83 EOSINOPHILIC ASTHMA: ICD-10-CM

## 2025-02-27 DIAGNOSIS — J84.89 OTHER SPECIFIED INTERSTITIAL PULMONARY DISEASES: ICD-10-CM

## 2025-02-27 PROCEDURE — 99214 OFFICE O/P EST MOD 30 MIN: CPT | Mod: 25

## 2025-02-27 PROCEDURE — 94010 BREATHING CAPACITY TEST: CPT

## 2025-02-27 PROCEDURE — 95012 NITRIC OXIDE EXP GAS DETER: CPT

## 2025-02-27 RX ORDER — PREDNISONE 1 MG/1
1 TABLET ORAL
Qty: 200 | Refills: 2 | Status: ACTIVE | COMMUNITY
Start: 2025-02-27 | End: 1900-01-01

## 2025-02-28 ENCOUNTER — APPOINTMENT (OUTPATIENT)
Dept: NEUROLOGY | Facility: CLINIC | Age: 60
End: 2025-02-28

## 2025-03-06 ENCOUNTER — RX RENEWAL (OUTPATIENT)
Age: 60
End: 2025-03-06

## 2025-03-06 RX ORDER — DUPILUMAB 300 MG/2ML
300 INJECTION, SOLUTION SUBCUTANEOUS
Qty: 4 | Refills: 3 | Status: ACTIVE | COMMUNITY
Start: 2025-03-06 | End: 1900-01-01

## 2025-03-11 ENCOUNTER — APPOINTMENT (OUTPATIENT)
Dept: CT IMAGING | Facility: CLINIC | Age: 60
End: 2025-03-11

## 2025-03-11 ENCOUNTER — OUTPATIENT (OUTPATIENT)
Dept: OUTPATIENT SERVICES | Facility: HOSPITAL | Age: 60
LOS: 1 days | End: 2025-03-11
Payer: COMMERCIAL

## 2025-03-11 DIAGNOSIS — Z98.89 OTHER SPECIFIED POSTPROCEDURAL STATES: Chronic | ICD-10-CM

## 2025-03-11 DIAGNOSIS — Z98.1 ARTHRODESIS STATUS: Chronic | ICD-10-CM

## 2025-03-11 DIAGNOSIS — Z00.8 ENCOUNTER FOR OTHER GENERAL EXAMINATION: ICD-10-CM

## 2025-03-11 DIAGNOSIS — R93.89 ABNORMAL FINDINGS ON DIAGNOSTIC IMAGING OF OTHER SPECIFIED BODY STRUCTURES: ICD-10-CM

## 2025-03-11 DIAGNOSIS — M71.30 OTHER BURSAL CYST, UNSPECIFIED SITE: Chronic | ICD-10-CM

## 2025-03-11 DIAGNOSIS — Z90.10 ACQUIRED ABSENCE OF UNSPECIFIED BREAST AND NIPPLE: Chronic | ICD-10-CM

## 2025-03-11 DIAGNOSIS — Z98.82 BREAST IMPLANT STATUS: Chronic | ICD-10-CM

## 2025-03-11 PROCEDURE — 71250 CT THORAX DX C-: CPT | Mod: 26

## 2025-03-11 PROCEDURE — 71250 CT THORAX DX C-: CPT

## 2025-03-26 ENCOUNTER — NON-APPOINTMENT (OUTPATIENT)
Age: 60
End: 2025-03-26

## 2025-03-26 ENCOUNTER — APPOINTMENT (OUTPATIENT)
Dept: NEUROLOGY | Facility: CLINIC | Age: 60
End: 2025-03-26
Payer: COMMERCIAL

## 2025-03-26 VITALS
HEART RATE: 78 BPM | BODY MASS INDEX: 23.05 KG/M2 | OXYGEN SATURATION: 94 % | WEIGHT: 135 LBS | HEIGHT: 64 IN | SYSTOLIC BLOOD PRESSURE: 110 MMHG | DIASTOLIC BLOOD PRESSURE: 70 MMHG

## 2025-03-26 DIAGNOSIS — R41.3 OTHER AMNESIA: ICD-10-CM

## 2025-03-26 PROCEDURE — 99215 OFFICE O/P EST HI 40 MIN: CPT

## 2025-03-26 PROCEDURE — 99205 OFFICE O/P NEW HI 60 MIN: CPT

## 2025-04-19 ENCOUNTER — OUTPATIENT (OUTPATIENT)
Dept: OUTPATIENT SERVICES | Facility: HOSPITAL | Age: 60
LOS: 1 days | End: 2025-04-19
Payer: COMMERCIAL

## 2025-04-19 ENCOUNTER — APPOINTMENT (OUTPATIENT)
Dept: ULTRASOUND IMAGING | Facility: CLINIC | Age: 60
End: 2025-04-19
Payer: COMMERCIAL

## 2025-04-19 DIAGNOSIS — M71.30 OTHER BURSAL CYST, UNSPECIFIED SITE: Chronic | ICD-10-CM

## 2025-04-19 DIAGNOSIS — Z98.1 ARTHRODESIS STATUS: Chronic | ICD-10-CM

## 2025-04-19 DIAGNOSIS — Z98.82 BREAST IMPLANT STATUS: Chronic | ICD-10-CM

## 2025-04-19 DIAGNOSIS — Z15.02 GENETIC SUSCEPTIBILITY TO MALIGNANT NEOPLASM OF OVARY: ICD-10-CM

## 2025-04-19 DIAGNOSIS — Z98.89 OTHER SPECIFIED POSTPROCEDURAL STATES: Chronic | ICD-10-CM

## 2025-04-19 DIAGNOSIS — Z00.8 ENCOUNTER FOR OTHER GENERAL EXAMINATION: ICD-10-CM

## 2025-04-19 DIAGNOSIS — Z90.10 ACQUIRED ABSENCE OF UNSPECIFIED BREAST AND NIPPLE: Chronic | ICD-10-CM

## 2025-04-19 PROCEDURE — 76856 US EXAM PELVIC COMPLETE: CPT | Mod: 26

## 2025-04-19 PROCEDURE — 76830 TRANSVAGINAL US NON-OB: CPT | Mod: 26

## 2025-04-19 PROCEDURE — 76856 US EXAM PELVIC COMPLETE: CPT

## 2025-04-19 PROCEDURE — 76830 TRANSVAGINAL US NON-OB: CPT

## 2025-05-01 ENCOUNTER — NON-APPOINTMENT (OUTPATIENT)
Age: 60
End: 2025-05-01

## 2025-05-01 ENCOUNTER — APPOINTMENT (OUTPATIENT)
Dept: NEUROLOGY | Facility: CLINIC | Age: 60
End: 2025-05-01
Payer: COMMERCIAL

## 2025-05-01 VITALS
WEIGHT: 134 LBS | HEART RATE: 76 BPM | SYSTOLIC BLOOD PRESSURE: 104 MMHG | HEIGHT: 64 IN | BODY MASS INDEX: 22.88 KG/M2 | OXYGEN SATURATION: 98 % | DIASTOLIC BLOOD PRESSURE: 64 MMHG

## 2025-05-01 DIAGNOSIS — R41.3 OTHER AMNESIA: ICD-10-CM

## 2025-05-01 DIAGNOSIS — E03.9 HYPOTHYROIDISM, UNSPECIFIED: ICD-10-CM

## 2025-05-01 PROCEDURE — 99214 OFFICE O/P EST MOD 30 MIN: CPT

## 2025-05-02 ENCOUNTER — LABORATORY RESULT (OUTPATIENT)
Age: 60
End: 2025-05-02

## 2025-05-06 LAB
25(OH)D3 SERPL-MCNC: 59.5 NG/ML
T3FREE SERPL-MCNC: 2.42 PG/ML
T4 FREE SERPL-MCNC: 1.4 NG/DL
T4 SERPL-MCNC: 9 UG/DL
TSH SERPL-ACNC: 2.05 UIU/ML
ZINC SERPL-MCNC: 58 UG/DL

## 2025-05-08 ENCOUNTER — OUTPATIENT (OUTPATIENT)
Dept: OUTPATIENT SERVICES | Facility: HOSPITAL | Age: 60
LOS: 1 days | End: 2025-05-08

## 2025-05-08 ENCOUNTER — APPOINTMENT (OUTPATIENT)
Dept: NUCLEAR MEDICINE | Facility: CLINIC | Age: 60
End: 2025-05-08

## 2025-05-08 DIAGNOSIS — Z98.89 OTHER SPECIFIED POSTPROCEDURAL STATES: Chronic | ICD-10-CM

## 2025-05-08 DIAGNOSIS — Z98.1 ARTHRODESIS STATUS: Chronic | ICD-10-CM

## 2025-05-08 DIAGNOSIS — Z00.8 ENCOUNTER FOR OTHER GENERAL EXAMINATION: ICD-10-CM

## 2025-05-08 DIAGNOSIS — Z98.82 BREAST IMPLANT STATUS: Chronic | ICD-10-CM

## 2025-05-08 DIAGNOSIS — Z90.10 ACQUIRED ABSENCE OF UNSPECIFIED BREAST AND NIPPLE: Chronic | ICD-10-CM

## 2025-05-08 DIAGNOSIS — M71.30 OTHER BURSAL CYST, UNSPECIFIED SITE: Chronic | ICD-10-CM

## 2025-05-08 PROCEDURE — 78608 BRAIN IMAGING (PET): CPT | Mod: 26

## 2025-05-08 PROCEDURE — 78999 UNLISTED MISC PX DX NUC MED: CPT | Mod: 26

## 2025-05-20 ENCOUNTER — APPOINTMENT (OUTPATIENT)
Facility: CLINIC | Age: 60
End: 2025-05-20
Payer: COMMERCIAL

## 2025-05-20 VITALS
SYSTOLIC BLOOD PRESSURE: 119 MMHG | WEIGHT: 134 LBS | HEIGHT: 64 IN | HEART RATE: 101 BPM | DIASTOLIC BLOOD PRESSURE: 77 MMHG | BODY MASS INDEX: 22.88 KG/M2

## 2025-05-20 PROCEDURE — 31575 DIAGNOSTIC LARYNGOSCOPY: CPT

## 2025-05-20 PROCEDURE — 99214 OFFICE O/P EST MOD 30 MIN: CPT | Mod: 25

## 2025-05-20 RX ORDER — LEVOTHYROXINE SODIUM 88 UG/1
88 TABLET ORAL
Refills: 0 | Status: ACTIVE | COMMUNITY

## 2025-05-20 RX ORDER — PREDNISONE 10 MG/1
10 TABLET ORAL
Refills: 0 | Status: ACTIVE | COMMUNITY

## 2025-06-02 ENCOUNTER — APPOINTMENT (OUTPATIENT)
Dept: NEUROLOGY | Facility: CLINIC | Age: 60
End: 2025-06-02
Payer: COMMERCIAL

## 2025-06-02 VITALS
BODY MASS INDEX: 22.88 KG/M2 | HEIGHT: 64 IN | SYSTOLIC BLOOD PRESSURE: 125 MMHG | DIASTOLIC BLOOD PRESSURE: 82 MMHG | OXYGEN SATURATION: 99 % | WEIGHT: 134 LBS | HEART RATE: 90 BPM

## 2025-06-02 DIAGNOSIS — G31.01 PICK'S DISEASE: ICD-10-CM

## 2025-06-02 DIAGNOSIS — F02.80 PICK'S DISEASE: ICD-10-CM

## 2025-06-02 PROCEDURE — 99214 OFFICE O/P EST MOD 30 MIN: CPT

## 2025-06-02 RX ORDER — MEMANTINE 5 MG/1
5 TABLET ORAL
Qty: 60 | Refills: 3 | Status: ACTIVE | COMMUNITY
Start: 2025-06-02 | End: 1900-01-01

## 2025-06-02 RX ORDER — DONEPEZIL HYDROCHLORIDE 5 MG/1
5 TABLET ORAL
Qty: 60 | Refills: 3 | Status: ACTIVE | COMMUNITY
Start: 2025-06-02 | End: 1900-01-01

## 2025-06-10 LAB
APOE INTERPRETATION: NORMAL
APOE REASON FOR REFERRAL: NORMAL
APOE RELEASED BY: NORMAL
APOE RESULT SUMMARY: NORMAL
APOE RESULT: NORMAL
APOE SPECIMEN: NORMAL

## 2025-06-17 ENCOUNTER — APPOINTMENT (OUTPATIENT)
Dept: NEUROLOGY | Facility: CLINIC | Age: 60
End: 2025-06-17
Payer: COMMERCIAL

## 2025-06-17 VITALS
SYSTOLIC BLOOD PRESSURE: 121 MMHG | DIASTOLIC BLOOD PRESSURE: 73 MMHG | HEART RATE: 81 BPM | WEIGHT: 134 LBS | BODY MASS INDEX: 22.88 KG/M2 | TEMPERATURE: 97.7 F | HEIGHT: 64 IN | OXYGEN SATURATION: 98 %

## 2025-06-17 PROCEDURE — 99205 OFFICE O/P NEW HI 60 MIN: CPT

## 2025-06-17 PROCEDURE — G2211 COMPLEX E/M VISIT ADD ON: CPT | Mod: NC

## 2025-06-18 ENCOUNTER — RX RENEWAL (OUTPATIENT)
Age: 60
End: 2025-06-18

## 2025-06-19 ENCOUNTER — RX RENEWAL (OUTPATIENT)
Age: 60
End: 2025-06-19

## 2025-07-02 ENCOUNTER — APPOINTMENT (OUTPATIENT)
Dept: NUCLEAR MEDICINE | Facility: CLINIC | Age: 60
End: 2025-07-02

## 2025-07-02 PROCEDURE — 78814 PET IMAGE W/CT LMTD: CPT

## 2025-07-02 PROCEDURE — 78999 UNLISTED MISC PX DX NUC MED: CPT

## 2025-07-21 DIAGNOSIS — G30.0 ALZHEIMER'S DISEASE WITH EARLY ONSET: ICD-10-CM

## 2025-07-21 DIAGNOSIS — F02.B0 ALZHEIMER'S DISEASE WITH EARLY ONSET: ICD-10-CM

## 2025-07-21 RX ORDER — MEMANTINE 10 MG/1
10 TABLET ORAL TWICE DAILY
Qty: 60 | Refills: 3 | Status: ACTIVE | COMMUNITY
Start: 2025-07-21 | End: 1900-01-01

## 2025-08-05 ENCOUNTER — APPOINTMENT (OUTPATIENT)
Dept: NEUROLOGY | Facility: CLINIC | Age: 60
End: 2025-08-05

## 2025-09-04 ENCOUNTER — APPOINTMENT (OUTPATIENT)
Dept: PULMONOLOGY | Facility: CLINIC | Age: 60
End: 2025-09-04
Payer: COMMERCIAL

## 2025-09-04 VITALS
HEART RATE: 86 BPM | TEMPERATURE: 97.2 F | RESPIRATION RATE: 16 BRPM | WEIGHT: 141 LBS | DIASTOLIC BLOOD PRESSURE: 80 MMHG | OXYGEN SATURATION: 95 % | HEIGHT: 64 IN | SYSTOLIC BLOOD PRESSURE: 118 MMHG | BODY MASS INDEX: 24.07 KG/M2

## 2025-09-04 DIAGNOSIS — J45.50 SEVERE PERSISTENT ASTHMA, UNCOMPLICATED: ICD-10-CM

## 2025-09-04 DIAGNOSIS — K21.9 GASTRO-ESOPHAGEAL REFLUX DISEASE W/OUT ESOPHAGITIS: ICD-10-CM

## 2025-09-04 DIAGNOSIS — R06.02 SHORTNESS OF BREATH: ICD-10-CM

## 2025-09-04 DIAGNOSIS — J45.909 UNSPECIFIED ASTHMA, UNCOMPLICATED: ICD-10-CM

## 2025-09-04 DIAGNOSIS — J82.83 EOSINOPHILIC ASTHMA: ICD-10-CM

## 2025-09-04 DIAGNOSIS — J84.89 OTHER SPECIFIED INTERSTITIAL PULMONARY DISEASES: ICD-10-CM

## 2025-09-04 DIAGNOSIS — J82.81 CHRONIC EOSINOPHILIC PNEUMONIA: ICD-10-CM

## 2025-09-04 DIAGNOSIS — R93.89 ABNORMAL FINDINGS ON DIAGNOSTIC IMAGING OF OTHER SPECIFIED BODY STRUCTURES: ICD-10-CM

## 2025-09-04 PROCEDURE — 99214 OFFICE O/P EST MOD 30 MIN: CPT | Mod: 25

## 2025-09-04 PROCEDURE — 94727 GAS DIL/WSHOT DETER LNG VOL: CPT

## 2025-09-04 PROCEDURE — ZZZZZ: CPT

## 2025-09-04 PROCEDURE — 95012 NITRIC OXIDE EXP GAS DETER: CPT

## 2025-09-04 PROCEDURE — 94729 DIFFUSING CAPACITY: CPT

## 2025-09-04 PROCEDURE — 94010 BREATHING CAPACITY TEST: CPT

## 2025-09-12 ENCOUNTER — APPOINTMENT (OUTPATIENT)
Dept: CT IMAGING | Facility: CLINIC | Age: 60
End: 2025-09-12

## 2025-09-12 PROCEDURE — 71250 CT THORAX DX C-: CPT | Mod: 26

## 2025-09-17 ENCOUNTER — APPOINTMENT (OUTPATIENT)
Dept: NEUROLOGY | Facility: CLINIC | Age: 60
End: 2025-09-17
Payer: COMMERCIAL

## 2025-09-17 VITALS
SYSTOLIC BLOOD PRESSURE: 131 MMHG | OXYGEN SATURATION: 97 % | WEIGHT: 141 LBS | HEART RATE: 67 BPM | HEIGHT: 64 IN | BODY MASS INDEX: 24.07 KG/M2 | DIASTOLIC BLOOD PRESSURE: 80 MMHG

## 2025-09-17 DIAGNOSIS — F02.80 PICK'S DISEASE: ICD-10-CM

## 2025-09-17 DIAGNOSIS — G31.01 PICK'S DISEASE: ICD-10-CM

## 2025-09-17 DIAGNOSIS — G30.0 ALZHEIMER'S DISEASE WITH EARLY ONSET: ICD-10-CM

## 2025-09-17 DIAGNOSIS — F02.B0 ALZHEIMER'S DISEASE WITH EARLY ONSET: ICD-10-CM

## 2025-09-17 PROCEDURE — 99214 OFFICE O/P EST MOD 30 MIN: CPT

## 2025-09-18 RX ORDER — FLUTICASONE PROPIONATE 50 UG/1
50 SPRAY NASAL TWICE DAILY
Qty: 3 | Refills: 1 | Status: ACTIVE | COMMUNITY
Start: 2025-09-18 | End: 1900-01-01